# Patient Record
Sex: FEMALE | Race: WHITE | Employment: UNEMPLOYED | URBAN - METROPOLITAN AREA
[De-identification: names, ages, dates, MRNs, and addresses within clinical notes are randomized per-mention and may not be internally consistent; named-entity substitution may affect disease eponyms.]

---

## 2020-03-06 LAB
HBA1C MFR BLD HPLC: 5.3 %
LDL-C, EXTERNAL: 145

## 2020-07-02 LAB — CREATININE, EXTERNAL: 0.64

## 2020-07-06 ENCOUNTER — HOSPITAL ENCOUNTER (OUTPATIENT)
Dept: PREADMISSION TESTING | Age: 65
Discharge: HOME | End: 2020-07-06
Payer: OTHER GOVERNMENT

## 2020-07-06 PROCEDURE — 87635 SARS-COV-2 COVID-19 AMP PRB: CPT

## 2020-07-09 ENCOUNTER — ANESTHESIA EVENT (OUTPATIENT)
Dept: SURGERY | Age: 65
End: 2020-07-09
Payer: OTHER GOVERNMENT

## 2020-07-09 LAB — SARS-COV-2, COV2NT: NOT DETECTED

## 2020-07-09 RX ORDER — ERGOCALCIFEROL 1.25 MG/1
50000 CAPSULE ORAL
COMMUNITY
End: 2022-05-19

## 2020-07-09 RX ORDER — DIPHENHYDRAMINE HCL 25 MG
25 CAPSULE ORAL
COMMUNITY

## 2020-07-10 ENCOUNTER — HOSPITAL ENCOUNTER (OUTPATIENT)
Age: 65
Setting detail: OUTPATIENT SURGERY
Discharge: HOME | End: 2020-07-10
Attending: PODIATRIST | Admitting: PODIATRIST
Payer: OTHER GOVERNMENT

## 2020-07-10 ENCOUNTER — ANESTHESIA (OUTPATIENT)
Dept: SURGERY | Age: 65
End: 2020-07-10
Payer: OTHER GOVERNMENT

## 2020-07-10 VITALS
WEIGHT: 175.2 LBS | TEMPERATURE: 97.3 F | DIASTOLIC BLOOD PRESSURE: 64 MMHG | HEIGHT: 67 IN | OXYGEN SATURATION: 96 % | BODY MASS INDEX: 27.5 KG/M2 | SYSTOLIC BLOOD PRESSURE: 109 MMHG | RESPIRATION RATE: 16 BRPM | HEART RATE: 56 BPM

## 2020-07-10 DIAGNOSIS — R52 PAIN: Primary | ICD-10-CM

## 2020-07-10 PROCEDURE — 77030020268 HC MISC GENERAL SUPPLY: Performed by: PODIATRIST

## 2020-07-10 PROCEDURE — 76010000133 HC OR TIME 3 TO 3.5 HR: Performed by: PODIATRIST

## 2020-07-10 PROCEDURE — C1713 ANCHOR/SCREW BN/BN,TIS/BN: HCPCS | Performed by: PODIATRIST

## 2020-07-10 PROCEDURE — 74011000250 HC RX REV CODE- 250: Performed by: NURSE ANESTHETIST, CERTIFIED REGISTERED

## 2020-07-10 PROCEDURE — 76060000037 HC ANESTHESIA 3 TO 3.5 HR: Performed by: PODIATRIST

## 2020-07-10 PROCEDURE — 76210000006 HC OR PH I REC 0.5 TO 1 HR: Performed by: PODIATRIST

## 2020-07-10 PROCEDURE — 77030040922 HC BLNKT HYPOTHRM STRY -A: Performed by: PODIATRIST

## 2020-07-10 PROCEDURE — 77030020753 HC CUF TRNQT 1BLA STRY -B: Performed by: PODIATRIST

## 2020-07-10 PROCEDURE — 77030040361 HC SLV COMPR DVT MDII -B: Performed by: PODIATRIST

## 2020-07-10 PROCEDURE — 77030018836 HC SOL IRR NACL ICUM -A: Performed by: PODIATRIST

## 2020-07-10 PROCEDURE — 77030006773 HC BLD SAW OSC BRSM -A: Performed by: PODIATRIST

## 2020-07-10 PROCEDURE — 76210000026 HC REC RM PH II 1 TO 1.5 HR: Performed by: PODIATRIST

## 2020-07-10 PROCEDURE — 74011250636 HC RX REV CODE- 250/636: Performed by: NURSE ANESTHETIST, CERTIFIED REGISTERED

## 2020-07-10 PROCEDURE — 74011000250 HC RX REV CODE- 250: Performed by: PODIATRIST

## 2020-07-10 PROCEDURE — 77030021122 HC SPLNT MAT FST BSNM -A: Performed by: PODIATRIST

## 2020-07-10 PROCEDURE — 77030006722: Performed by: PODIATRIST

## 2020-07-10 PROCEDURE — 74011250637 HC RX REV CODE- 250/637: Performed by: NURSE ANESTHETIST, CERTIFIED REGISTERED

## 2020-07-10 PROCEDURE — 77030002933 HC SUT MCRYL J&J -A: Performed by: PODIATRIST

## 2020-07-10 PROCEDURE — 74011250637 HC RX REV CODE- 250/637: Performed by: ANESTHESIOLOGY

## 2020-07-10 PROCEDURE — 77030031139 HC SUT VCRL2 J&J -A: Performed by: PODIATRIST

## 2020-07-10 PROCEDURE — 77030011247 HC DRN WND KT TLS STRY -B: Performed by: PODIATRIST

## 2020-07-10 PROCEDURE — 74011250636 HC RX REV CODE- 250/636: Performed by: PODIATRIST

## 2020-07-10 PROCEDURE — 77030002986 HC SUT PROL J&J -A: Performed by: PODIATRIST

## 2020-07-10 PROCEDURE — 77030006632 HC BLD ENDOSC DISP STRY -C: Performed by: PODIATRIST

## 2020-07-10 PROCEDURE — 77030011265 HC ELECTRD BLD HEX COVD -A: Performed by: PODIATRIST

## 2020-07-10 DEVICE — SCR BNE NLCK 3.5X14MM STRL -- MOTOBAND CP: Type: IMPLANTABLE DEVICE | Site: FOOT | Status: FUNCTIONAL

## 2020-07-10 RX ORDER — MAGNESIUM SULFATE 100 %
4 CRYSTALS MISCELLANEOUS AS NEEDED
Status: DISCONTINUED | OUTPATIENT
Start: 2020-07-10 | End: 2020-07-10 | Stop reason: HOSPADM

## 2020-07-10 RX ORDER — NEOMYCIN AND POLYMYXIN B SULFATES 40; 200000 MG/ML; [USP'U]/ML
SOLUTION IRRIGATION AS NEEDED
Status: DISCONTINUED | OUTPATIENT
Start: 2020-07-10 | End: 2020-07-10 | Stop reason: HOSPADM

## 2020-07-10 RX ORDER — PROPOFOL 10 MG/ML
INJECTION, EMULSION INTRAVENOUS AS NEEDED
Status: DISCONTINUED | OUTPATIENT
Start: 2020-07-10 | End: 2020-07-10 | Stop reason: HOSPADM

## 2020-07-10 RX ORDER — OXYCODONE AND ACETAMINOPHEN 5; 325 MG/1; MG/1
1 TABLET ORAL AS NEEDED
Status: DISCONTINUED | OUTPATIENT
Start: 2020-07-10 | End: 2020-07-10 | Stop reason: HOSPADM

## 2020-07-10 RX ORDER — INSULIN LISPRO 100 [IU]/ML
INJECTION, SOLUTION INTRAVENOUS; SUBCUTANEOUS ONCE
Status: DISCONTINUED | OUTPATIENT
Start: 2020-07-10 | End: 2020-07-10 | Stop reason: HOSPADM

## 2020-07-10 RX ORDER — ONDANSETRON 2 MG/ML
INJECTION INTRAMUSCULAR; INTRAVENOUS AS NEEDED
Status: DISCONTINUED | OUTPATIENT
Start: 2020-07-10 | End: 2020-07-10 | Stop reason: HOSPADM

## 2020-07-10 RX ORDER — GLYCOPYRROLATE 0.2 MG/ML
INJECTION INTRAMUSCULAR; INTRAVENOUS AS NEEDED
Status: DISCONTINUED | OUTPATIENT
Start: 2020-07-10 | End: 2020-07-10 | Stop reason: HOSPADM

## 2020-07-10 RX ORDER — NEOSTIGMINE METHYLSULFATE 1 MG/ML
INJECTION, SOLUTION INTRAVENOUS AS NEEDED
Status: DISCONTINUED | OUTPATIENT
Start: 2020-07-10 | End: 2020-07-10 | Stop reason: HOSPADM

## 2020-07-10 RX ORDER — BUPIVACAINE HYDROCHLORIDE 5 MG/ML
INJECTION, SOLUTION EPIDURAL; INTRACAUDAL AS NEEDED
Status: DISCONTINUED | OUTPATIENT
Start: 2020-07-10 | End: 2020-07-10 | Stop reason: HOSPADM

## 2020-07-10 RX ORDER — ROCURONIUM BROMIDE 10 MG/ML
INJECTION, SOLUTION INTRAVENOUS AS NEEDED
Status: DISCONTINUED | OUTPATIENT
Start: 2020-07-10 | End: 2020-07-10 | Stop reason: HOSPADM

## 2020-07-10 RX ORDER — SCOLOPAMINE TRANSDERMAL SYSTEM 1 MG/1
1 PATCH, EXTENDED RELEASE TRANSDERMAL
Status: DISCONTINUED | OUTPATIENT
Start: 2020-07-10 | End: 2020-07-10 | Stop reason: HOSPADM

## 2020-07-10 RX ORDER — FENTANYL CITRATE 50 UG/ML
50 INJECTION, SOLUTION INTRAMUSCULAR; INTRAVENOUS
Status: DISCONTINUED | OUTPATIENT
Start: 2020-07-10 | End: 2020-07-10 | Stop reason: HOSPADM

## 2020-07-10 RX ORDER — DEXAMETHASONE SODIUM PHOSPHATE 4 MG/ML
INJECTION, SOLUTION INTRA-ARTICULAR; INTRALESIONAL; INTRAMUSCULAR; INTRAVENOUS; SOFT TISSUE AS NEEDED
Status: DISCONTINUED | OUTPATIENT
Start: 2020-07-10 | End: 2020-07-10 | Stop reason: HOSPADM

## 2020-07-10 RX ORDER — FENTANYL CITRATE 50 UG/ML
25 INJECTION, SOLUTION INTRAMUSCULAR; INTRAVENOUS AS NEEDED
Status: DISCONTINUED | OUTPATIENT
Start: 2020-07-10 | End: 2020-07-10 | Stop reason: HOSPADM

## 2020-07-10 RX ORDER — LIDOCAINE HYDROCHLORIDE 10 MG/ML
INJECTION, SOLUTION EPIDURAL; INFILTRATION; INTRACAUDAL; PERINEURAL AS NEEDED
Status: DISCONTINUED | OUTPATIENT
Start: 2020-07-10 | End: 2020-07-10 | Stop reason: HOSPADM

## 2020-07-10 RX ORDER — SODIUM CHLORIDE, SODIUM LACTATE, POTASSIUM CHLORIDE, CALCIUM CHLORIDE 600; 310; 30; 20 MG/100ML; MG/100ML; MG/100ML; MG/100ML
50 INJECTION, SOLUTION INTRAVENOUS CONTINUOUS
Status: DISCONTINUED | OUTPATIENT
Start: 2020-07-10 | End: 2020-07-10 | Stop reason: HOSPADM

## 2020-07-10 RX ORDER — DEXTROSE 50 % IN WATER (D50W) INTRAVENOUS SYRINGE
25-50 AS NEEDED
Status: DISCONTINUED | OUTPATIENT
Start: 2020-07-10 | End: 2020-07-10 | Stop reason: HOSPADM

## 2020-07-10 RX ORDER — FENTANYL CITRATE 50 UG/ML
INJECTION, SOLUTION INTRAMUSCULAR; INTRAVENOUS AS NEEDED
Status: DISCONTINUED | OUTPATIENT
Start: 2020-07-10 | End: 2020-07-10 | Stop reason: HOSPADM

## 2020-07-10 RX ORDER — ASPIRIN 325 MG
325 TABLET ORAL DAILY
Qty: 21 TAB | Refills: 0 | Status: SHIPPED | OUTPATIENT
Start: 2020-07-10 | End: 2020-07-31

## 2020-07-10 RX ORDER — LIDOCAINE HYDROCHLORIDE 20 MG/ML
INJECTION, SOLUTION EPIDURAL; INFILTRATION; INTRACAUDAL; PERINEURAL AS NEEDED
Status: DISCONTINUED | OUTPATIENT
Start: 2020-07-10 | End: 2020-07-10 | Stop reason: HOSPADM

## 2020-07-10 RX ORDER — AMOXICILLIN AND CLAVULANATE POTASSIUM 875; 125 MG/1; MG/1
1 TABLET, FILM COATED ORAL 2 TIMES DAILY
Qty: 20 TAB | Refills: 0 | Status: SHIPPED | OUTPATIENT
Start: 2020-07-10 | End: 2020-07-20

## 2020-07-10 RX ORDER — MIDAZOLAM HYDROCHLORIDE 1 MG/ML
INJECTION, SOLUTION INTRAMUSCULAR; INTRAVENOUS AS NEEDED
Status: DISCONTINUED | OUTPATIENT
Start: 2020-07-10 | End: 2020-07-10 | Stop reason: HOSPADM

## 2020-07-10 RX ORDER — FAMOTIDINE 20 MG/1
20 TABLET, FILM COATED ORAL ONCE
Status: COMPLETED | OUTPATIENT
Start: 2020-07-10 | End: 2020-07-10

## 2020-07-10 RX ORDER — SODIUM CHLORIDE, SODIUM LACTATE, POTASSIUM CHLORIDE, CALCIUM CHLORIDE 600; 310; 30; 20 MG/100ML; MG/100ML; MG/100ML; MG/100ML
25 INJECTION, SOLUTION INTRAVENOUS CONTINUOUS
Status: DISCONTINUED | OUTPATIENT
Start: 2020-07-10 | End: 2020-07-10 | Stop reason: HOSPADM

## 2020-07-10 RX ORDER — HYDROCODONE BITARTRATE AND ACETAMINOPHEN 5; 300 MG/1; MG/1
1 TABLET ORAL
Qty: 21 TAB | Refills: 0 | Status: SHIPPED | OUTPATIENT
Start: 2020-07-10 | End: 2022-05-19 | Stop reason: ALTCHOICE

## 2020-07-10 RX ADMIN — FENTANYL CITRATE 50 MCG: 50 INJECTION, SOLUTION INTRAMUSCULAR; INTRAVENOUS at 10:54

## 2020-07-10 RX ADMIN — OXYCODONE HYDROCHLORIDE AND ACETAMINOPHEN 1 TABLET: 5; 325 TABLET ORAL at 12:40

## 2020-07-10 RX ADMIN — SODIUM CHLORIDE, SODIUM LACTATE, POTASSIUM CHLORIDE, AND CALCIUM CHLORIDE 50 ML/HR: 600; 310; 30; 20 INJECTION, SOLUTION INTRAVENOUS at 06:26

## 2020-07-10 RX ADMIN — GLYCOPYRROLATE 0.4 MG: 0.2 INJECTION INTRAMUSCULAR; INTRAVENOUS at 10:07

## 2020-07-10 RX ADMIN — FENTANYL CITRATE 50 MCG: 50 INJECTION, SOLUTION INTRAMUSCULAR; INTRAVENOUS at 07:36

## 2020-07-10 RX ADMIN — PROPOFOL 150 MG: 10 INJECTION, EMULSION INTRAVENOUS at 07:36

## 2020-07-10 RX ADMIN — FENTANYL CITRATE 50 MCG: 50 INJECTION, SOLUTION INTRAMUSCULAR; INTRAVENOUS at 11:35

## 2020-07-10 RX ADMIN — LIDOCAINE HYDROCHLORIDE 40 MG: 20 INJECTION, SOLUTION EPIDURAL; INFILTRATION; INTRACAUDAL; PERINEURAL at 07:36

## 2020-07-10 RX ADMIN — WATER 1 G: 1 INJECTION INTRAMUSCULAR; INTRAVENOUS; SUBCUTANEOUS at 07:50

## 2020-07-10 RX ADMIN — FAMOTIDINE 20 MG: 20 TABLET ORAL at 06:26

## 2020-07-10 RX ADMIN — MIDAZOLAM HYDROCHLORIDE 2 MG: 2 INJECTION, SOLUTION INTRAMUSCULAR; INTRAVENOUS at 07:30

## 2020-07-10 RX ADMIN — ROCURONIUM BROMIDE 50 MG: 50 INJECTION INTRAVENOUS at 07:36

## 2020-07-10 RX ADMIN — ONDANSETRON 4 MG: 2 INJECTION INTRAMUSCULAR; INTRAVENOUS at 10:06

## 2020-07-10 RX ADMIN — Medication 3 MG: at 10:07

## 2020-07-10 RX ADMIN — DEXAMETHASONE SODIUM PHOSPHATE 4 MG: 4 INJECTION, SOLUTION INTRAMUSCULAR; INTRAVENOUS at 10:06

## 2020-07-10 NOTE — PROGRESS NOTES
conducted a pre-surgery visit with [facility], who is a 60 y.o.,female. The  provided the following Interventions:  Initiated a relationship of care and support. Plan:  Chaplains will continue to follow and will provide pastoral care on an as needed/requested basis.  recommends bedside caregivers page  on duty if patient shows signs of acute spiritual or emotional distress.     [facility]  [phone]

## 2020-07-10 NOTE — ANESTHESIA PREPROCEDURE EVALUATION
Relevant Problems   No relevant active problems       Anesthetic History     PONV          Review of Systems / Medical History  Patient summary reviewed, nursing notes reviewed and pertinent labs reviewed    Pulmonary  Within defined limits                 Neuro/Psych   Within defined limits           Cardiovascular                  Exercise tolerance: >4 METS     GI/Hepatic/Renal  Within defined limits              Endo/Other  Within defined limits           Other Findings              Physical Exam    Airway  Mallampati: II  TM Distance: 4 - 6 cm  Neck ROM: normal range of motion   Mouth opening: Normal     Cardiovascular  Regular rate and rhythm,  S1 and S2 normal,  no murmur, click, rub, or gallop  Rhythm: regular  Rate: normal         Dental  No notable dental hx       Pulmonary  Breath sounds clear to auscultation               Abdominal  GI exam deferred       Other Findings            Anesthetic Plan    ASA: 2  Anesthesia type: general          Induction: Intravenous  Anesthetic plan and risks discussed with: Patient

## 2020-07-10 NOTE — BRIEF OP NOTE
Brief Postoperative Note    Patient: Allyssa Rivera  YOB: 1955  MRN: [id number]    Date of Procedure: 7/10/2020     Pre-Op Diagnosis: Left foot hallux rigidus, plantar fasciitis with heel spur    Post-Op Diagnosis: Same    Procedure(s):  LEFT FOOT FIRST METATARSOPHALANGEAL JOINT FUSION, ENDOSCOPIC PLANTAR FACIOTOMY WITH REMOVAL OF HEEL SPUR    Surgeon(s):  Shawna Holcomb DPM    Surgical Assistant: None    Anesthesia: MAC     Estimated Blood Loss (mL): 15 cc    Complications: None    Specimens: * No specimens in log *     Implants:   Implant Name Type Inv.  Item Serial No.  Lot No. LRB No. Used Action   dynaforce mpj short plate [id number]    [employer] [id number] Left 1 Implanted   SCR BNE NLCK 3.5X14MM STRL -- MOTOBAND CP - [id number]  SCR BNE NLCK 3.5X14MM STRL -- MOTOBAND CP  [employer] [id number] Left 1 Implanted   SCR BNE NLCK 3.5X14MM STRL -- MOTOBAND CP - [id number]  SCR BNE NLCK 3.5X14MM STRL -- MOTOBAND CP  [employer] [id number] Left 1 Implanted   motoband polyaxial locking screw    [employer] [id number] Left 1 Implanted   motoband polyaxial locking screw    [employer] [id number] Left 1 Implanted       Drains: None    Findings: 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

## 2020-07-10 NOTE — H&P
History and Physical Addendum      Patient is medically cleared for surgery by PCP. No new clinical changes noted since then.

## 2020-07-10 NOTE — ANESTHESIA POSTPROCEDURE EVALUATION
Procedure(s):  LEFT FOOT FIRST METATARSOPHALANGEAL JOINT FUSION/FIRST MTP FUSION PLATE FROM SARA (ANGEL WILSON)  ENDOSCOPIC PLANTAR FACIOTOMY WITH REMOVAL OF HEEL SPUR. general    Anesthesia Post Evaluation      Multimodal analgesia: multimodal analgesia used between 6 hours prior to anesthesia start to PACU discharge  Patient location during evaluation: bedside  Patient participation: complete - patient participated  Level of consciousness: awake  Pain management: adequate  Airway patency: patent  Anesthetic complications: no  Cardiovascular status: stable  Respiratory status: acceptable  Hydration status: acceptable  Post anesthesia nausea and vomiting:  controlled      INITIAL Post-op Vital signs:   Vitals Value Taken Time   /65 7/10/2020 11:52 AM   Temp 36.3 °C (97.3 °F) 7/10/2020 11:02 AM   Pulse 56 7/10/2020 11:54 AM   Resp 14 7/10/2020 11:54 AM   SpO2 98 % 7/10/2020 11:54 AM   Vitals shown include unvalidated device data.

## 2020-07-10 NOTE — DISCHARGE INSTRUCTIONS
INSTRUCTIONS FOLLOWING FOOT SURGERY    ACTIVITY:  · Elevate feet were 48 hours  · Use ice as directed by your doctor  · Use crutches as directed by your doctor    DIET:  · Clear liquids until no nausea or vomiting; then light diet for the first day  · An advance to regular diet On second day, unless your doctor orders otherwise. PAIN:  · Take pain medication ouster acted by your doctor. · Call your doctor if pain is not relieved by medication. · Do not take aspirin or blood thinners until directed by your doctor. CALL YOUR DOCTOR IF:  · Excessive bleeding that does not stop after holding mild pressure over the area  · Temperature of 101° F or above  · Redness, excessive swelling or bruising, and/or green or yellow, smelly discharge from incision  · Loss of sensation -cold, white, or blue toes    AFTER ANESTHESIA :   · For the first 24 hours: do not drive, drink alcoholic beverages, or make important decisions. · Be aware of dizziness following anesthesia and while taking pain medication. Patient Education        Plantar Fasciitis: Care Instructions  Your Care Instructions     Plantar fasciitis is pain and inflammation of the plantar fascia, the tissue at the bottom of your foot that connects the heel bone to the toes. The plantar fascia also supports the arch. If you strain the plantar fascia, it can develop small tears and cause heel pain when you stand or walk. Plantar fasciitis can be caused by running or other sports. It also may occur in people who are overweight or who have high arches or flat feet. You may get plantar fasciitis if you walk or stand for long periods, or have a tight Achilles tendon or calf muscles. You can improve your foot pain with rest and other care at home. It might take a few weeks to a few months for your foot to heal completely. Follow-up care is a key part of your treatment and safety.  Be sure to make and go to all appointments, and call your doctor if you are having problems. It's also a good idea to know your test results and keep a list of the medicines you take. How can you care for yourself at home? · Rest your feet often. Reduce your activity to a level that lets you avoid pain. If possible, do not run or walk on hard surfaces. · Take pain medicines exactly as directed. ? If the doctor gave you a prescription medicine for pain, take it as prescribed. ? If you are not taking a prescription pain medicine, take an over-the-counter anti-inflammatory medicine for pain and swelling, such as ibuprofen (Advil, Motrin) or naproxen (Aleve). Read and follow all instructions on the label. · Use ice massage to help with pain and swelling. You can use an ice cube or an ice cup several times a day. To make an ice cup, fill a paper cup with water and freeze it. Cut off the top of the cup until a half-inch of ice shows. Hold onto the remaining paper to use the cup. Rub the ice in small circles over the area for 5 to 7 minutes. · Contrast baths, which alternate hot and cold water, can also help reduce swelling. But because heat alone may make pain and swelling worse, end a contrast bath with a soak in cold water. · Wear a night splint if your doctor suggests it. A night splint holds your foot with the toes pointed up and the foot and ankle at a 90-degree angle. This position gives the bottom of your foot a constant, gentle stretch. · Do simple exercises such as calf stretches and towel stretches 2 to 3 times each day, especially when you first get up in the morning. These can help the plantar fascia become more flexible. They also make the muscles that support your arch stronger. Hold these stretches for 15 to 30 seconds per stretch. Repeat 2 to 4 times. ? Stand about 1 foot from a wall. Place the palms of both hands against the wall at chest level.  Lean forward against the wall, keeping one leg with the knee straight and heel on the ground while bending the knee of the other leg.  ? Sit down on the floor or a mat with your feet stretched in front of you. Roll up a towel lengthwise, and loop it over the ball of your foot. Holding the towel at both ends, gently pull the towel toward you to stretch your foot. · Wear shoes with good arch support. Athletic shoes or shoes with a well-cushioned sole are good choices. · Try heel cups or shoe inserts (orthotics) to help cushion your heel. You can buy these at many shoe stores. · Put on your shoes as soon as you get out of bed. Going barefoot or wearing slippers may make your pain worse. · Reach and stay at a good weight for your height. This puts less strain on your feet. When should you call for help? Call your doctor now or seek immediate medical care if:  · You have heel pain with fever, redness, or warmth in your heel. · You cannot put weight on the sore foot. Watch closely for changes in your health, and be sure to contact your doctor if:  · You have numbness or tingling in your heel. · Your heel pain lasts more than 2 weeks. Where can you learn more? Go to http://jie-frandy.info/  Enter X351 in the search box to learn more about \"Plantar Fasciitis: Care Instructions. \"  Current as of: March 2, 2020               Content Version: 12.5  © 5543-3615 [employer], [employer]. Care instructions adapted under license by [employer] (which disclaims liability or warranty for this information). If you have questions about a medical condition or this instruction, always ask your healthcare professional. [place] [address] any warranty or liability for your use of this information.       DISCHARGE SUMMARY from Nurse    PATIENT INSTRUCTIONS:    After general anesthesia or intravenous sedation, for 24 hours or while taking prescription Narcotics:  · Limit your activities  · Do not drive and operate hazardous machinery  · Do not make important personal or business decisions  · Do  not drink alcoholic beverages  · If you have not urinated within 8 hours after discharge, please contact your surgeon on call. Report the following to your surgeon:  · Excessive pain, swelling, redness or odor of or around the surgical area  · Temperature over 100.5  · Nausea and vomiting lasting longer than 4 hours or if unable to take medications  · Any signs of decreased circulation or nerve impairment to extremity: change in color, persistent  numbness, tingling, coldness or increase pain  · Any questions    What to do at Home:  Recommended activity: Activity as tolerated and no driving for today. *  Please give a list of your current medications to your Primary Care Provider. *  Please update this list whenever your medications are discontinued, doses are      changed, or new medications (including over-the-counter products) are added. *  Please carry medication information at all times in case of emergency situations. These are general instructions for a healthy lifestyle:    No smoking/ No tobacco products/ Avoid exposure to second hand smoke  Surgeon General's Warning:  Quitting smoking now greatly reduces serious risk to your health. Obesity, smoking, and sedentary lifestyle greatly increases your risk for illness    A healthy diet, regular physical exercise & weight monitoring are important for maintaining a healthy lifestyle    You may be retaining fluid if you have a history of heart failure or if you experience any of the following symptoms:  Weight gain of 3 pounds or more overnight or 5 pounds in a week, increased swelling in our hands or feet or shortness of breath while lying flat in bed. Please call your doctor as soon as you notice any of these symptoms; do not wait until your next office visit. The discharge information has been reviewed with the {PATIENT PARENT GUARDIAN:12703}. The {PATIENT PARENT GUARDIAN:42642} verbalized understanding.   Discharge medications reviewed with the {Dishcarge meds reviewed [facility]:10141} and appropriate educational materials and side effects teaching were provided.   ___________________________________________________________________________________________________________________________________

## 2020-07-11 NOTE — OP NOTES
Operative Report     Patient: Zach Godoy MRN: [id number]  SSN: [id number]    YOB: 1955  Age: 60 y.o. Sex: female       Indications: The patient was seen in office for left heel pain as well as for left 1st MTPJ pain. Patient noted to have severe DJD of 1st MTPJ. Patient has tried orthotics and stretching without relief. She also had an MRI of left foot which confirmed plantar fasciitis. Patient had exhausted conservative treatment and wanted to proceed with surgical intervention. All risks, benefits, complications of procedure discussed in detail with patient. No guarantee made to outcome of procedure. Patient voiced understanding and signed consent. Date of Surgery: 7/10/2020     Preoperative Diagnosis:  Left foot hallux rigidus, plantar fasciitis with heel spur    Postoperative Diagnosis: Same    Surgeon(s) and Role:     * Mallory Castorena DPM - Primary      Anesthesia: MAC    Procedure:  Procedure(s):  LEFT FOOT FIRST METATARSOPHALANGEAL JOINT ARTHRODESIS, ENDOSCOPIC PLANTAR FACIOTOMY WITH REMOVAL OF HEEL SPUR    Procedure in Detail:     Patient brought into the operating room and placed on the table in a supine position. Timeout performed with myself in the room verifying correct procedure, site, instrumentation present. A well-padded tourniquet was applied to the ankle. An Esmarch bandage was utilized to exsanguinate the limb after a sterile prep. Tourniquet was inflated to 250 mmHg. A 6 cm slightly curvilinear incision was made over the dorsal aspect of the left first metatarsophalangeal joint ending dorsally extending from mid metatarsal shaft to base of proximal phalanx. It was deepened down with care to cauterize superficial vessels. Medial neurovascular bundle was identified, protected, and retracted. Linear capsular incision was made delivering first metatarsophalangeal joint into the surgical field. Noted bony osteophyte with fragments noted at joint.  Once exposure of the base of the proximal phalanx and the head of the 1st metatarsal were performed, a combination of sagittal saw as well as reamers was utilized to create healthy bleeding bony tissues about the arthrodesis site in a cup and cone fashion. At this time a temporary fixation of k-wire was thrown from distal medial to proximal lateral across the 1st metatarsophalangeal joint arthrodesis site. At this time, hardware in the form of a plate and staple and screws was placed using standard technique. [place] 1st MTP plate, staple and screws were utilized. Excellent stability, compression and toe position was achieved with confirmation on FluoroScan. Please note that care was taken to fuse the hallux in a slightly dorsiflexed position approximately 5 degrees in the sagittal plane with slight abduction / neutral position within the transverse plane and no frontal plane deviation. Intraoperative fluoroscopy was brought into the operative site for evaluation of the correction. The correction was found to be excellent. The wound was flushed with copious amounts of normal physiologic sterile saline and inspected for any soft tissue or osseous debris, none of which was found. The capsule/periosteum was reapproximated with 2-0 Vicryl suture. The subcutaneous tissue was reapproximated with 3-0 vicryl suture. The subcuticular tissue was reapproximated with 4-0 monocryl suture. At this time, attention was directed to the patients medial left heel, where the medial calcaneal tubercle was palpated. A horizontal 1-cm incision was made approximately 2 cm distal from the medial calcaneal tubercle. The incision was deep into the subcutaneous tissues using blunt dissection. At this time, a blunt probe was inserted and utilized to identify the boundaries of the plantar fascia. A positive puckering was noted to the plantar aspect of the patients left foot confirming proper placement of the probe.   Next, the obturator and trocar were inserted through this medial incision inferior to the plantar fascia and was transversely directed to the lateral aspect of the heel until tenting of the skin was noted on the lateral aspect. At this site, a second horizontal 1-cm incision was made to allow the exit of the trocar obturator combo. At this point, the trocar was removed, and three to four Q-tips were run from medial to lateral to remove any fatty deposits or other debris. The scope was then placed through the obturator from the lateral incision site to visualize the plantar fascia. Next, the hook blade was placed along the plantar aspect of the patients medial heel, where the medial half of the plantar fascia was approximated and appropriately marked. This hook blade was then inserted medially, and the central and medial band of the plantar fascia were carefully transected. Under endoscopic evaluation, it was noted that the lateral band of the plantar fascia was intact while the medial and central half of the plantar fascia were appropriately released. With the obturator in place, once again three to four Q-tips were run from medial to lateral to remove any remnants of fat. Medial incision extended proximally and under fluoroscopy plantar heel spur identified and palpated. Using bone rongeur, prominent heel spur resected and it was confirmed. The surgical site was then flushed with copious amounts of normal sterile saline. The medial incision site subcutaneous tissue was approximated with 3-0 vicryl and skin was coapted utilizing 4-0 prolene in a simple interrupted technique. The lateral incision site was re-approximated and coapted utilizing 4-0 prolen. The left foot was then dressed with Adaptic overlying the suture sites, 4 x 4 gauze, Kerlix, and ace bandage.   At this time, the left ankle pneumatic tourniquet was deflated, and a positive hyperemic response was noted to the left foot with the capillary refill time less than 5 seconds to digits 1 through 5. Posterior splint in neutral ankle position applied. The patient tolerated the procedure and anesthesia well. Upon transfer to the recovery room, the patients vital signs were stable, and her neurovascular status was intact. Findings:  Severe DJD of 1st MTPJ with fragmentation    Estimated Blood Loss:  15 cc    Drains: none           Specimens: * No specimens in log *            Implants:    Implant Name Type Inv.  Item Serial No.  Lot No. LRB No. Used Action   PLATE SHORT [id number] DORSIFLEX    [employer] [id number] Left 1 Implanted   SCR BNE NLCK 3.5X14MM STRL -- MOTOBAND CP - [id number]  SCR BNE NLCK 3.5X14MM STRL -- MOTOBAND CP  [employer] [id number] Left 1 Implanted   SCR BNE NLCK 3.5X14MM STRL -- MOTOBAND CP - [id number]  SCR BNE NLCK 3.5X14MM STRL -- MOTOBAND CP  [employer] [id number] Left 1 Implanted   SCR LCK 3.5X16MM    [employer] [id number] Left 1 Implanted   SCR LCK 3.5X16MM    [employer] [id number] Left 1 Implanted   IMPL KT [id number]     [id number] Left 1 Implanted              Complications:  None

## 2020-12-20 ENCOUNTER — APPOINTMENT (OUTPATIENT)
Dept: GENERAL RADIOLOGY | Age: 65
DRG: 177 | End: 2020-12-20
Attending: EMERGENCY MEDICINE
Payer: OTHER GOVERNMENT

## 2020-12-20 ENCOUNTER — APPOINTMENT (OUTPATIENT)
Dept: CT IMAGING | Age: 65
DRG: 177 | End: 2020-12-20
Attending: EMERGENCY MEDICINE
Payer: OTHER GOVERNMENT

## 2020-12-20 ENCOUNTER — HOSPITAL ENCOUNTER (INPATIENT)
Age: 65
LOS: 5 days | Discharge: HOME | DRG: 177 | End: 2020-12-25
Attending: EMERGENCY MEDICINE | Admitting: HOSPITALIST
Payer: OTHER GOVERNMENT

## 2020-12-20 DIAGNOSIS — E87.6 HYPOKALEMIA: ICD-10-CM

## 2020-12-20 DIAGNOSIS — J96.01 ACUTE RESPIRATORY FAILURE WITH HYPOXIA (HCC): ICD-10-CM

## 2020-12-20 DIAGNOSIS — U07.1 PNEUMONIA DUE TO COVID-19 VIRUS: Primary | ICD-10-CM

## 2020-12-20 DIAGNOSIS — J12.82 PNEUMONIA DUE TO COVID-19 VIRUS: Primary | ICD-10-CM

## 2020-12-20 LAB
ALBUMIN SERPL-MCNC: 3.1 G/DL (ref 3.4–5)
ALBUMIN/GLOB SERPL: 0.7 {RATIO} (ref 0.8–1.7)
ALP SERPL-CCNC: 298 U/L (ref 45–117)
ALT SERPL-CCNC: 48 U/L (ref 13–56)
ANION GAP SERPL CALC-SCNC: 4 MMOL/L (ref 3–18)
APTT PPP: 26.6 SEC (ref 23–36.4)
AST SERPL-CCNC: 46 U/L (ref 10–38)
ATRIAL RATE: 65 BPM
BASOPHILS # BLD: 0 K/UL (ref 0–0.1)
BASOPHILS NFR BLD: 0 % (ref 0–2)
BILIRUB SERPL-MCNC: 0.5 MG/DL (ref 0.2–1)
BNP SERPL-MCNC: 154 PG/ML (ref 0–900)
BUN SERPL-MCNC: 11 MG/DL (ref 7–18)
BUN/CREAT SERPL: 18 (ref 12–20)
CALCIUM SERPL-MCNC: 9.2 MG/DL (ref 8.5–10.1)
CALCULATED P AXIS, ECG09: 52 DEGREES
CALCULATED R AXIS, ECG10: -12 DEGREES
CALCULATED T AXIS, ECG11: 24 DEGREES
CHLORIDE SERPL-SCNC: 104 MMOL/L (ref 100–111)
CO2 SERPL-SCNC: 31 MMOL/L (ref 21–32)
COVID-19 RAPID TEST, COVR: DETECTED
CREAT SERPL-MCNC: 0.61 MG/DL (ref 0.6–1.3)
CRP SERPL-MCNC: 11.2 MG/DL (ref 0–0.3)
D DIMER PPP FEU-MCNC: 0.9 UG/ML(FEU)
DIAGNOSIS, 93000: NORMAL
DIFFERENTIAL METHOD BLD: ABNORMAL
EOSINOPHIL # BLD: 0 K/UL (ref 0–0.4)
EOSINOPHIL NFR BLD: 1 % (ref 0–5)
ERYTHROCYTE [DISTWIDTH] IN BLOOD BY AUTOMATED COUNT: 12.3 % (ref 11.6–14.5)
FERRITIN SERPL-MCNC: 391 NG/ML (ref 8–388)
GLOBULIN SER CALC-MCNC: 4.6 G/DL (ref 2–4)
GLUCOSE SERPL-MCNC: 102 MG/DL (ref 74–99)
HCT VFR BLD AUTO: 39.6 % (ref 35–45)
HGB BLD-MCNC: 12.9 G/DL (ref 12–16)
INR PPP: 1 (ref 0.8–1.2)
LACTATE BLD-SCNC: 1.18 MMOL/L (ref 0.4–2)
LDH SERPL L TO P-CCNC: 364 U/L (ref 81–234)
LYMPHOCYTES # BLD: 0.9 K/UL (ref 0.9–3.6)
LYMPHOCYTES NFR BLD: 20 % (ref 21–52)
MCH RBC QN AUTO: 28.5 PG (ref 24–34)
MCHC RBC AUTO-ENTMCNC: 32.6 G/DL (ref 31–37)
MCV RBC AUTO: 87.4 FL (ref 74–97)
MONOCYTES # BLD: 0.3 K/UL (ref 0.05–1.2)
MONOCYTES NFR BLD: 6 % (ref 3–10)
NEUTS SEG # BLD: 3.2 K/UL (ref 1.8–8)
NEUTS SEG NFR BLD: 73 % (ref 40–73)
P-R INTERVAL, ECG05: 194 MS
PLATELET # BLD AUTO: 300 K/UL (ref 135–420)
PMV BLD AUTO: 9.5 FL (ref 9.2–11.8)
POTASSIUM SERPL-SCNC: 3.4 MMOL/L (ref 3.5–5.5)
PROT SERPL-MCNC: 7.7 G/DL (ref 6.4–8.2)
PROTHROMBIN TIME: 12.9 SEC (ref 11.5–15.2)
Q-T INTERVAL, ECG07: 418 MS
QRS DURATION, ECG06: 82 MS
QTC CALCULATION (BEZET), ECG08: 434 MS
RBC # BLD AUTO: 4.53 M/UL (ref 4.2–5.3)
SODIUM SERPL-SCNC: 139 MMOL/L (ref 136–145)
SOURCE, COVRS: ABNORMAL
SPECIMEN TYPE, XMCV1T: ABNORMAL
TROPONIN I SERPL-MCNC: <0.02 NG/ML (ref 0–0.04)
VENTRICULAR RATE, ECG03: 65 BPM
WBC # BLD AUTO: 4.4 K/UL (ref 4.6–13.2)

## 2020-12-20 PROCEDURE — XW033E5 INTRODUCTION OF REMDESIVIR ANTI-INFECTIVE INTO PERIPHERAL VEIN, PERCUTANEOUS APPROACH, NEW TECHNOLOGY GROUP 5: ICD-10-PCS | Performed by: HOSPITALIST

## 2020-12-20 PROCEDURE — 74011250636 HC RX REV CODE- 250/636: Performed by: EMERGENCY MEDICINE

## 2020-12-20 PROCEDURE — 99285 EMERGENCY DEPT VISIT HI MDM: CPT

## 2020-12-20 PROCEDURE — 77010033678 HC OXYGEN DAILY

## 2020-12-20 PROCEDURE — 65660000000 HC RM CCU STEPDOWN

## 2020-12-20 PROCEDURE — 74011000250 HC RX REV CODE- 250: Performed by: EMERGENCY MEDICINE

## 2020-12-20 PROCEDURE — 93005 ELECTROCARDIOGRAM TRACING: CPT

## 2020-12-20 PROCEDURE — 85730 THROMBOPLASTIN TIME PARTIAL: CPT

## 2020-12-20 PROCEDURE — 87635 SARS-COV-2 COVID-19 AMP PRB: CPT

## 2020-12-20 PROCEDURE — 74011250636 HC RX REV CODE- 250/636: Performed by: PHYSICIAN ASSISTANT

## 2020-12-20 PROCEDURE — 80053 COMPREHEN METABOLIC PANEL: CPT

## 2020-12-20 PROCEDURE — 94762 N-INVAS EAR/PLS OXIMTRY CONT: CPT

## 2020-12-20 PROCEDURE — 85610 PROTHROMBIN TIME: CPT

## 2020-12-20 PROCEDURE — 82728 ASSAY OF FERRITIN: CPT

## 2020-12-20 PROCEDURE — 85025 COMPLETE CBC W/AUTO DIFF WBC: CPT

## 2020-12-20 PROCEDURE — 71275 CT ANGIOGRAPHY CHEST: CPT

## 2020-12-20 PROCEDURE — 96361 HYDRATE IV INFUSION ADD-ON: CPT

## 2020-12-20 PROCEDURE — 74011250637 HC RX REV CODE- 250/637: Performed by: PHYSICIAN ASSISTANT

## 2020-12-20 PROCEDURE — 74011000258 HC RX REV CODE- 258: Performed by: EMERGENCY MEDICINE

## 2020-12-20 PROCEDURE — 83605 ASSAY OF LACTIC ACID: CPT

## 2020-12-20 PROCEDURE — 74011000636 HC RX REV CODE- 636: Performed by: EMERGENCY MEDICINE

## 2020-12-20 PROCEDURE — 85379 FIBRIN DEGRADATION QUANT: CPT

## 2020-12-20 PROCEDURE — 74011250637 HC RX REV CODE- 250/637: Performed by: EMERGENCY MEDICINE

## 2020-12-20 PROCEDURE — 71045 X-RAY EXAM CHEST 1 VIEW: CPT

## 2020-12-20 PROCEDURE — 96374 THER/PROPH/DIAG INJ IV PUSH: CPT

## 2020-12-20 PROCEDURE — 83880 ASSAY OF NATRIURETIC PEPTIDE: CPT

## 2020-12-20 PROCEDURE — 86140 C-REACTIVE PROTEIN: CPT

## 2020-12-20 PROCEDURE — 84484 ASSAY OF TROPONIN QUANT: CPT

## 2020-12-20 PROCEDURE — 83615 LACTATE (LD) (LDH) ENZYME: CPT

## 2020-12-20 RX ORDER — SODIUM CHLORIDE 0.9 % (FLUSH) 0.9 %
5-40 SYRINGE (ML) INJECTION AS NEEDED
Status: DISCONTINUED | OUTPATIENT
Start: 2020-12-20 | End: 2020-12-25 | Stop reason: HOSPADM

## 2020-12-20 RX ORDER — DIPHENHYDRAMINE HCL 25 MG
25 CAPSULE ORAL
Status: DISCONTINUED | OUTPATIENT
Start: 2020-12-20 | End: 2020-12-25 | Stop reason: HOSPADM

## 2020-12-20 RX ORDER — SODIUM CHLORIDE 0.9 % (FLUSH) 0.9 %
5-40 SYRINGE (ML) INJECTION EVERY 8 HOURS
Status: DISCONTINUED | OUTPATIENT
Start: 2020-12-20 | End: 2020-12-25 | Stop reason: HOSPADM

## 2020-12-20 RX ORDER — PROMETHAZINE HYDROCHLORIDE 25 MG/1
12.5 TABLET ORAL
Status: DISCONTINUED | OUTPATIENT
Start: 2020-12-20 | End: 2020-12-25 | Stop reason: HOSPADM

## 2020-12-20 RX ORDER — POTASSIUM CHLORIDE 7.45 MG/ML
10 INJECTION INTRAVENOUS
Status: DISCONTINUED | OUTPATIENT
Start: 2020-12-20 | End: 2020-12-20

## 2020-12-20 RX ORDER — POLYETHYLENE GLYCOL 3350 17 G/17G
17 POWDER, FOR SOLUTION ORAL DAILY PRN
Status: DISCONTINUED | OUTPATIENT
Start: 2020-12-20 | End: 2020-12-25 | Stop reason: HOSPADM

## 2020-12-20 RX ORDER — DEXAMETHASONE SODIUM PHOSPHATE 100 MG/10ML
10 INJECTION INTRAMUSCULAR; INTRAVENOUS DAILY
Status: DISCONTINUED | OUTPATIENT
Start: 2020-12-21 | End: 2020-12-20

## 2020-12-20 RX ORDER — DEXAMETHASONE SODIUM PHOSPHATE 4 MG/ML
6 INJECTION, SOLUTION INTRA-ARTICULAR; INTRALESIONAL; INTRAMUSCULAR; INTRAVENOUS; SOFT TISSUE ONCE
Status: COMPLETED | OUTPATIENT
Start: 2020-12-20 | End: 2020-12-20

## 2020-12-20 RX ORDER — ACETAMINOPHEN 650 MG/1
650 SUPPOSITORY RECTAL
Status: DISCONTINUED | OUTPATIENT
Start: 2020-12-20 | End: 2020-12-25 | Stop reason: HOSPADM

## 2020-12-20 RX ORDER — ONDANSETRON 2 MG/ML
4 INJECTION INTRAMUSCULAR; INTRAVENOUS
Status: DISCONTINUED | OUTPATIENT
Start: 2020-12-20 | End: 2020-12-25 | Stop reason: HOSPADM

## 2020-12-20 RX ORDER — ACETAMINOPHEN 325 MG/1
650 TABLET ORAL
Status: DISCONTINUED | OUTPATIENT
Start: 2020-12-20 | End: 2020-12-25 | Stop reason: HOSPADM

## 2020-12-20 RX ORDER — ENOXAPARIN SODIUM 100 MG/ML
40 INJECTION SUBCUTANEOUS EVERY 24 HOURS
Status: DISCONTINUED | OUTPATIENT
Start: 2020-12-20 | End: 2020-12-20

## 2020-12-20 RX ORDER — ENOXAPARIN SODIUM 100 MG/ML
30 INJECTION SUBCUTANEOUS EVERY 12 HOURS
Status: DISCONTINUED | OUTPATIENT
Start: 2020-12-20 | End: 2020-12-25 | Stop reason: HOSPADM

## 2020-12-20 RX ADMIN — Medication 10 ML: at 22:00

## 2020-12-20 RX ADMIN — DIPHENHYDRAMINE HYDROCHLORIDE 25 MG: 25 CAPSULE ORAL at 21:45

## 2020-12-20 RX ADMIN — IOPAMIDOL 100 ML: 755 INJECTION, SOLUTION INTRAVENOUS at 10:20

## 2020-12-20 RX ADMIN — ENOXAPARIN SODIUM 30 MG: 30 INJECTION SUBCUTANEOUS at 20:00

## 2020-12-20 RX ADMIN — SODIUM CHLORIDE 1000 ML: 900 INJECTION, SOLUTION INTRAVENOUS at 10:04

## 2020-12-20 RX ADMIN — POTASSIUM BICARBONATE 20 MEQ: 782 TABLET, EFFERVESCENT ORAL at 11:03

## 2020-12-20 RX ADMIN — Medication 10 ML: at 16:00

## 2020-12-20 RX ADMIN — REMDESIVIR 200 MG: 100 INJECTION, POWDER, LYOPHILIZED, FOR SOLUTION INTRAVENOUS at 15:09

## 2020-12-20 RX ADMIN — DEXAMETHASONE SODIUM PHOSPHATE 6 MG: 4 INJECTION, SOLUTION INTRAMUSCULAR; INTRAVENOUS at 12:23

## 2020-12-20 NOTE — PROGRESS NOTES
1445: Pt arrived to room 2706 via stretcher from ED. Pt covid +, placed on droplet plus precautions. VSS, nasal canula at 4 L. Pt proveded teaching on droplet plus precautions. All questions answered. 1515: Dr Puja Cisneros at the bedside. 1545: Dr Matt Singh at the bedside. 1930: Bedside and Verbal shift change report given to Malissa BROOKS (oncoming nurse) by Thalia Humphrey RN   (offgoing nurse). Report included the following information SBAR, Kardex, Intake/Output, MAR, Recent Results and Cardiac Rhythm NSR.

## 2020-12-20 NOTE — PROGRESS NOTES
Problem: Discharge Planning  Goal: *Discharge to safe environment  Outcome: Progressing Towards Goal  Plan is home vs home with [facility]    Reason for Admission:  Covid 19 respiratory distress                   RUR Score:    Na- still in ER                 Plan for utilizing home health:     tbd     PCP: First and Last name:  Susanna Colon   Name of Practice:    Are you a current patient: Yes/No: yes, but not for long, next month she will turn 73 yrs old and her insurance will change from [address] to Medicare/  for life. She is going to be switching MD's. Approximate date of last visit:    Can you participate in a virtual visit with your PCP:                     Current Advanced Directive/Advance Care Plan: not on chart                       Transition of Care Plan:    Chart reviewed and pt is not available by cell phone. I am unable to go to her room due to droplet plus covid isolation. Patient does not have home o2 but she is on 5 lpm o2 presently. I Called her when she is now  In ICU and spoke with her . She lives with her  and has her daughter staying with them while the daughter recuperates from something.( maybe covid?)  Patient is independent with ADL and does not use any DME. Plan is most likely home, Possibly be needing home health. Contact  #1 per pt- Daughter Devon Garcia  #2 per pt--   [address] [facility] [phone]      Care Management Interventions  PCP Verified by CM: No  Palliative Care Criteria Met (RRAT>21 & CHF Dx)?: No  Mode of Transport at Discharge: Other (see comment)(tbd)  Transition of Care Consult (CM Consult): Discharge Planning  Current Support Network: Lives with Spouse  Confirm Follow Up Transport: Family  The Plan for Transition of Care is Related to the Following Treatment Goals : resolution of acute symptoms     Unable to finish conversation due to MD came into the room to see pt. Latoya Stephens.  TIKI Hitchcock  [facility]  [phone], Pager [phone]  [email or web address]

## 2020-12-20 NOTE — ED PROVIDER NOTES
EMERGENCY DEPARTMENT HISTORY AND PHYSICAL EXAM    9:42 AM    Date: 12/20/2020  Patient Name: Jasbir Serra    History of Presenting Illness     Chief Complaint   Patient presents with    Positive For Covid-19    Respiratory Distress       History Provided By: Patient  Location/Duration/Severity/Modifying factors   Patient is a 29-year-old female with no prior medical history presenting today with a chief complaint of shortness of breath and cough. Symptoms have been going on for about 4 days although she originally had cough and body aches and upper respiratory symptoms which prompted her to get a Covid test at urgent care on December 11. This test came back positive. She reports that she has not lost her taste or smell. She has intermittent \"twinges\" of chest pain in her right upper chest.  She reports that this morning the breathing became so bad that she did call the ambulance. Patient reportedly was 88% by EMS on the way here, 84% on arrival here on room air was placed on 6 L of oxygen. She not have any active chest pain at this time. She is not experiencing any known fevers at home. No leg swelling or history of PE or DVT in the past.  Her cough is mostly dry. Breathing is described as having difficulty taking a deep breath. PCP: India Coughlin MD    Current Facility-Administered Medications   Medication Dose Route Frequency Provider Last Rate Last Admin    remdesivir 200 mg in 0.9% sodium chloride 250 mL IVPB  200 mg IntraVENous ONCE Bridget MIXON DO        Followed by   Tyra Hill ON 12/21/2020] remdesivir 100 mg in 0.9% sodium chloride 250 mL IVPB  100 mg IntraVENous Q24H Mary Anne DO         Current Outpatient Medications   Medication Sig Dispense Refill    HYDROcodone-acetaminophen (XODOL) 5-300 mg tablet Take 1 Tab by mouth every six (6) hours as needed for Pain for up to 7 days. Max Daily Amount: 4 Tabs.  21 Tab 0    ergocalciferol (Vitamin D2) 1,250 mcg (50,000 unit) capsule Take 50,000 Units by mouth every seven (7) days.  VITAMIN B COMPLEX PO Take  by mouth.  diphenhydrAMINE (BenadryL) 25 mg capsule Take 25 mg by mouth nightly as needed. Past History     Past Medical History:  Past Medical History:   Diagnosis Date    Nausea & vomiting        Past Surgical History:  Past Surgical History:   Procedure Laterality Date    HX COLONOSCOPY      HX GYN Left     oophorectomy    HX TONSILLECTOMY      HX UROLOGICAL      Lithotripsy    HX UROLOGICAL      Cystoscopy       Family History:  History reviewed. No pertinent family history. Social History:  Social History     Tobacco Use    Smoking status: Former Smoker     Quit date: 1991     Years since quittin.4   Substance Use Topics    Alcohol use: Never     Frequency: Never    Drug use: Not Currently     Types: Hallucinogenics, Marijuana     Comment: in her 25s       Allergies:  No Known Allergies    I reviewed and confirmed the above information with patient and updated as necessary. Review of Systems     Review of Systems   Constitutional: Negative for chills and fever. HENT: Negative for congestion, rhinorrhea, sinus pressure and sneezing. Eyes: Negative for visual disturbance. Respiratory: Positive for cough, chest tightness and shortness of breath. Negative for choking. Cardiovascular: Negative for chest pain and leg swelling. Gastrointestinal: Positive for nausea. Negative for abdominal pain, diarrhea and vomiting. Genitourinary: Negative for dysuria, frequency and urgency. Musculoskeletal: Negative for back pain and neck pain. Skin: Negative for rash. Neurological: Negative for syncope, numbness and headaches. Physical Exam     Visit Vitals  BP (!) 116/50   Pulse 68   Temp 98 °F (36.7 °C)   Resp 21   Ht 5' 7\" (1.702 m)   Wt 79.4 kg (175 lb)   SpO2 98%   BMI 27.41 kg/m²       Physical Exam  Vitals signs and nursing note reviewed.    Constitutional:       General: She is not in acute distress. Appearance: Normal appearance. She is normal weight. HENT:      Head: Normocephalic and atraumatic. Right Ear: External ear normal.      Left Ear: External ear normal.      Nose: Nose normal. No congestion or rhinorrhea. Mouth/Throat:      Mouth: Mucous membranes are moist.   Eyes:      Conjunctiva/sclera: Conjunctivae normal.   Neck:      Musculoskeletal: Normal range of motion and neck supple. Cardiovascular:      Rate and Rhythm: Normal rate and regular rhythm. Pulses: Normal pulses. Heart sounds: Normal heart sounds. No murmur. Comments: Mild resting tachypnea, no respiratory distress. Pulmonary:      Effort: Pulmonary effort is normal.      Breath sounds: Normal breath sounds. No wheezing, rhonchi or rales. Abdominal:      General: Abdomen is flat. Tenderness: There is no abdominal tenderness. There is no guarding or rebound. Musculoskeletal: Normal range of motion. General: No swelling or tenderness. Right lower leg: No edema. Left lower leg: No edema. Skin:     General: Skin is warm and dry. Capillary Refill: Capillary refill takes less than 2 seconds. Findings: No rash. Neurological:      General: No focal deficit present. Mental Status: She is alert. Diagnostic Study Results     Labs -  Recent Results (from the past 24 hour(s))   CBC WITH AUTOMATED DIFF    Collection Time: 12/20/20  9:14 AM   Result Value Ref Range    WBC 4.4 (L) 4.6 - 13.2 K/uL    RBC 4.53 4.20 - 5.30 M/uL    HGB 12.9 12.0 - 16.0 g/dL    HCT 39.6 35.0 - 45.0 %    MCV 87.4 74.0 - 97.0 FL    MCH 28.5 24.0 - 34.0 PG    MCHC 32.6 31.0 - 37.0 g/dL    RDW 12.3 11.6 - 14.5 %    PLATELET 446 214 - 853 K/uL    MPV 9.5 9.2 - 11.8 FL    NEUTROPHILS 73 40 - 73 %    LYMPHOCYTES 20 (L) 21 - 52 %    MONOCYTES 6 3 - 10 %    EOSINOPHILS 1 0 - 5 %    BASOPHILS 0 0 - 2 %    ABS. NEUTROPHILS 3.2 1.8 - 8.0 K/UL    ABS.  LYMPHOCYTES 0.9 0.9 - 3.6 K/UL    ABS. MONOCYTES 0.3 0.05 - 1.2 K/UL    ABS. EOSINOPHILS 0.0 0.0 - 0.4 K/UL    ABS. BASOPHILS 0.0 0.0 - 0.1 K/UL    DF AUTOMATED     METABOLIC PANEL, COMPREHENSIVE    Collection Time: 12/20/20  9:14 AM   Result Value Ref Range    Sodium 139 136 - 145 mmol/L    Potassium 3.4 (L) 3.5 - 5.5 mmol/L    Chloride 104 100 - 111 mmol/L    CO2 31 21 - 32 mmol/L    Anion gap 4 3.0 - 18 mmol/L    Glucose 102 (H) 74 - 99 mg/dL    BUN 11 7.0 - 18 MG/DL    Creatinine 0.61 0.6 - 1.3 MG/DL    BUN/Creatinine ratio 18 12 - 20      GFR est AA >60 >60 ml/min/1.73m2    GFR est non-AA >60 >60 ml/min/1.73m2    Calcium 9.2 8.5 - 10.1 MG/DL    Bilirubin, total 0.5 0.2 - 1.0 MG/DL    ALT (SGPT) 48 13 - 56 U/L    AST (SGOT) 46 (H) 10 - 38 U/L    Alk.  phosphatase 298 (H) 45 - 117 U/L    Protein, total 7.7 6.4 - 8.2 g/dL    Albumin 3.1 (L) 3.4 - 5.0 g/dL    Globulin 4.6 (H) 2.0 - 4.0 g/dL    A-G Ratio 0.7 (L) 0.8 - 1.7     PTT    Collection Time: 12/20/20  9:14 AM   Result Value Ref Range    aPTT 26.6 23.0 - 36.4 SEC   PROTHROMBIN TIME + INR    Collection Time: 12/20/20  9:14 AM   Result Value Ref Range    Prothrombin time 12.9 11.5 - 15.2 sec    INR 1.0 0.8 - 1.2     D DIMER    Collection Time: 12/20/20  9:14 AM   Result Value Ref Range    D DIMER 0.90 (H) <0.46 ug/ml(FEU)   TROPONIN I    Collection Time: 12/20/20  9:14 AM   Result Value Ref Range    Troponin-I, QT <0.02 0.0 - 0.045 NG/ML   NT-PRO BNP    Collection Time: 12/20/20  9:14 AM   Result Value Ref Range    NT pro- 0 - 900 PG/ML   POC LACTIC ACID    Collection Time: 12/20/20  9:17 AM   Result Value Ref Range    Lactic Acid (POC) 1.18 0.40 - 2.00 mmol/L   EKG, 12 LEAD, INITIAL    Collection Time: 12/20/20  9:17 AM   Result Value Ref Range    Ventricular Rate 65 BPM    Atrial Rate 65 BPM    P-R Interval 194 ms    QRS Duration 82 ms    Q-T Interval 418 ms    QTC Calculation (Bezet) 434 ms    Calculated P Axis 52 degrees    Calculated R Axis -12 degrees    Calculated T Axis 24 degrees    Diagnosis       Normal sinus rhythm  Normal ECG  No previous ECGs available  Confirmed by Ayla Rm MD, --- ([id number]) on 12/20/2020 11:04:48 AM     SARS-COV-2    Collection Time: 12/20/20  9:50 AM   Result Value Ref Range    Specimen source Nasopharyngeal      COVID-19 rapid test Detected (AA) NOTD      Specimen type NP Swab           Radiologic Studies -   CTA CHEST W OR W WO CONT   Final Result   IMPRESSION:      1. No evidence of pulmonary embolism. 2. Multifocal airspace disease with distribution as above, in keeping with   patient history of known COVID 19 pneumonia. XR CHEST PORT   Final Result   IMPRESSION:      Multifocal airspace disease as above in keeping with patient history of COVID   pneumonia. Medical Decision Making   I am the first provider for this patient. I reviewed the vital signs, available nursing notes, past medical history, past surgical history, family history and social history. Vital Signs-Reviewed the patient's vital signs. EKG: EKG: December 20, 2020, 0917. Normal sinus rhythm, rate of 65. Normal FL, QRS and QTc intervals. Normal axis. No ST segment elevation or depression. Overall normal sinus rhythm and normal EKG. Records Reviewed: Nursing Notes and Old Medical Records (Time of Review: 9:42 AM)    ED Course: Progress Notes, Reevaluation, and Consults:  ED Course as of Dec 20 1311   Sun Dec 20, 2020   1210 CTA chest negative for pulmonary embolism, multifocal opacities consistent with Covid. Patient had pulse oximetry that was low in the mid to low 80s on arrival here on room air, approximately 84% on room air. She is doing well now on 6 L of nasal cannula oxygen. Will likely require admission for hypoxic respiratory failure due to COVID-19. [ARASELI]   1236 Case discussed with Dr. Melanie Knight, of the ID service, agrees with Decadron and Remdesivir. Consider convalescent plasma, although data is not strong for use.       [ARASELI]      ED Course User Index  [ARASELI] Ziggy Gan DO         Provider Notes (Medical Decision Making):   MDM  Number of Diagnoses or Management Options  Acute respiratory failure with hypoxia ([address]  Hypokalemia  Pneumonia due to COVID-19 virus  Diagnosis management comments: Patient is a 69-year-old female with no medical history presenting today with shortness of breath. Reports she is Covid positive by test done at urgent care. Symptoms worsened today with increasing shortness of breath and patient felt she was unable to breathe. On exam lungs are grossly clear on auscultation, she is mildly tachypneic. No wheezing rales or rhonchi. She is not in overt distress but is hypoxic. Recorded 84% on room air on arrival here. Patient certainly not on any home oxygen but did improve here with nasal cannula supplementation. Differential includes COVID-19 pneumonia, pulmonary embolism, bacterial pneumonia, COPD, CHF, ACS, etc.    Plan will be to obtain laboratory results. Chest x-ray as well. Her chest x-ray reviewed by me shows patchy mild interstitial prominence. No effusion or pneumothorax. Her work-up demonstrates mild hypokalemia. D-dimer was positive, CTA of the chest demonstrates no pulmonary embolism but multiple opacities consistent with known COVID-19. Given patient's hypoxia down to 84% on room air, will require admission. Case discussed with hospitalist Dr. Carmine Card who agreed to accept the patient. Requested I contact the pharmacy to get authorization for remdesivir, which I called the pharmacy and they requested me to place a consult. Also discussed with ID who agrees with Decadron and remdesivir as already started. Discussed convalescent plasma however unclear if benefit. Patient updated with the plan and she will be admitted to the hospital service for further care. Patient expresses understanding and all questions answered.         Procedures    Critical Care Time: 30 minutes exclusive of procedures and teaching time in treatment of critical illness (Hypoxia)    Diagnosis     Clinical Impression:   1. Pneumonia due to COVID-19 virus    2. Acute respiratory failure with hypoxia (HCC)    3. Hypokalemia        Disposition: Admit    Follow-up Information    None          Patient's Medications   Start Taking    No medications on file   Continue Taking    DIPHENHYDRAMINE (BENADRYL) 25 MG CAPSULE    Take 25 mg by mouth nightly as needed. ERGOCALCIFEROL (VITAMIN D2) 1,250 MCG (50,000 UNIT) CAPSULE    Take 50,000 Units by mouth every seven (7) days. HYDROCODONE-ACETAMINOPHEN (XODOL) 5-300 MG TABLET    Take 1 Tab by mouth every six (6) hours as needed for Pain for up to 7 days. Max Daily Amount: 4 Tabs. VITAMIN B COMPLEX PO    Take  by mouth. These Medications have changed    No medications on file   Stop Taking    No medications on file       Jose R Chapman DO   Emergency Medicine   December 20, 2020, 9:42 AM     This note is dictated utilizing Dragon voice recognition software. Unfortunately this leads to occasional typographical errors using the voice recognition. I apologize in advance if the situation occurs. If questions occur please do not hesitate to contact me directly.     Jose R Chapman DO

## 2020-12-20 NOTE — CONSULTS
[facility]  (A Division of [address] [place] [address])      Consultation Note      Date of Admission: 12/20/2020    Date of Consultation: 12/20/2020    Referred by: Dr. Wilton Sanford    Reason for Referral: covid      Current Antimicrobials:    Prior Antimicrobials:  Remdesivir 12/20 - #1        Assessment: Rec / Plan:   Covid Pneumonia  - participated in maskless Bible study group around 12/6.  4/6 participants developed Covid  - runny nose, malaise, chills seats, shortness of breath  - CXR 12/20: multifocal ASD -> agree with Remdesivir  -> start decadron 6 mg daily x 10 days  -> OK to hold antibacterial agents at this time   Acute Hypoxic Respiratory Failure  - O2 sat 84% on arrival at ED -> O2 as needed         Microbiology:      Lines / Catheters:      HPI:  60year-old  female with no significant PMH admitted to [facility] for cough and shortness of breath. She had attended a Bible study group with 5 or 6 other unmasked participants around 2 weeks PTA and 3 days later felt like she had a \"cold\" with stuffy nose, malaise. 4 other participants developed COVID. She had a covid test done on 12/11 (9 days PTA) and this returned positive. Around two days PTA she had chills and sweats and became progressively short of breath and today called EMS who found her O2 sat at 88%. On arrival at the ED it was 84% and she was placed on 6 lpm NC O2%. CXR showed multifocal airspace disease. Remdesivir has been started. Active Hospital Problems    Diagnosis Date Noted    Pneumonia due to COVID-19 virus 12/20/2020     Past Medical History:   Diagnosis Date    Nausea & vomiting      Past Surgical History:   Procedure Laterality Date    HX COLONOSCOPY      HX GYN Left     oophorectomy    HX TONSILLECTOMY      HX UROLOGICAL      Lithotripsy    HX UROLOGICAL      Cystoscopy     History reviewed. No pertinent family history.   Social History     Socioeconomic History    Marital status:      Spouse name: Not on file    Number of children: Not on file    Years of education: Not on file    Highest education level: Not on file   Occupational History    Not on file   Social Needs    Financial resource strain: Not on file    Food insecurity     Worry: Not on file     Inability: Not on file    Transportation needs     Medical: Not on file     Non-medical: Not on file   Tobacco Use    Smoking status: Former Smoker     Quit date: 1991     Years since quittin.4   Substance and Sexual Activity    Alcohol use: Never     Frequency: Never    Drug use: Not Currently     Types: Hallucinogenics, Marijuana     Comment: in her 25s    Sexual activity: Not on file   Lifestyle    Physical activity     Days per week: Not on file     Minutes per session: Not on file    Stress: Not on file   Relationships    Social connections     Talks on phone: Not on file     Gets together: Not on file     Attends [employer] service: Not on file     Active member of club or organization: Not on file     Attends meetings of clubs or organizations: Not on file     Relationship status: Not on file    Intimate partner violence     Fear of current or ex partner: Not on file     Emotionally abused: Not on file     Physically abused: Not on file     Forced sexual activity: Not on file   Other Topics Concern    Not on file   Social History Narrative    Not on file       Allergies:  Patient has no known allergies. Medications:  Current Facility-Administered Medications   Medication Dose Route Frequency    remdesivir 200 mg in 0.9% sodium chloride 250 mL IVPB  200 mg IntraVENous ONCE    Followed by   Anell Mouse ON 2020] remdesivir 100 mg in 0.9% sodium chloride 250 mL IVPB  100 mg IntraVENous Q24H        ROS:  A comprehensive review of systems was negative except for that written in the History of Present Illness.      Physical Exam:    HPI:  Temp (24hrs), Av °F (36.7 °C), Min:98 °F (36.7 °C), Max:98 °F (36.7 °C)    Visit Vitals  BP (!) 116/50   Pulse 68   Temp 98 °F (36.7 °C)   Resp 21   Ht 5' 7\" (1.702 m)   Wt 79.4 kg (175 lb)   SpO2 97%   BMI 27.41 kg/m²       General: Well developed, well nourished 60 y.o. WHITE OR  female in no acute distress. ENT: ENT exam normal, no neck nodes or sinus tenderness  Head: normocephalic, without obvious abnormality  Mouth:  mucous membranes moist, pharynx normal without lesions  Neck: supple, symmetrical, trachea midline   Cardio:  regular rate and rhythm, S1, S2 normal, no murmur, click, rub or gallop  Chest: inspection normal - no chest wall deformities or tenderness, respiratory effort normal  Lungs: bilateral scattered crackles, no wheezes  Abdomen: soft, non-tender. Bowel sounds normal. No masses, no organomegaly.   Extremities:  no redness or tenderness in the calves or thighs, no edema  Neuro: Grossly normal       Lab results:    Chemistry  Recent Labs     12/20/20  0914   *      K 3.4*      CO2 31   BUN 11   CREA 0.61   CA 9.2   AGAP 4   BUCR 18   *   TP 7.7   ALB 3.1*   GLOB 4.6*   AGRAT 0.7*       CBC w/ Diff  Recent Labs     12/20/20  0914   WBC 4.4*   RBC 4.53   HGB 12.9   HCT 39.6      GRANS 73   LYMPH 20*   EOS 1       Microbiology  All Micro Results     None             Whitley Trujillo MD, [facility]  Infectious Diseases  [phone]  12/20/2020   2:25 PM

## 2020-12-20 NOTE — ED NOTES
TRANSFER - ED to INPATIENT REPORT:    Verbal report given to Ritu Dougherty RN (name) on Stephen Blank  being transferred to ICU (unit) for routine progression of care       Report consisted of patients Situation, Background, Assessment and   Recommendations(SBAR). SBAR report made available to receiving floor on this patient being transferred to  [phone] /2800)  for routine progression of care       Admitting diagnosis Pneumonia due to COVID-19 virus [U07.1, J12.89]    Information from the following report(s) SBAR, Kardex, ED Summary, MAR and Recent Results was made available to receiving floor. Lines:   Peripheral IV 12/20/20 Right Antecubital (Active)   Site Assessment Clean, dry, & intact 12/20/20 0922   Phlebitis Assessment 0 12/20/20 0922   Infiltration Assessment 0 12/20/20 0922   Dressing Status Clean, dry, & intact 12/20/20 9672   Dressing Type Transparent 12/20/20 0922   Hub Color/Line Status Green;Flushed;Patent 12/20/20 0922        HCG Status for ALL Females under 55 y/o: NO     Medication list unable to confirm    Opportunity for questions and clarification was provided.       Patient is oriented to time, place, person and situation Isolation precautions for COVID +  Patient is  continent and ambulatory without assist     Valuables transported with patient     Patient transported with:   Monitor  O2 @ 4 liters  Registered Nurse  Tech    MAP (Monitor): 66 =Monitored (most recent)  Vitals w/ MEWS Score (last day)     Date/Time MEWS Score Pulse Resp Temp BP Level of Consciousness SpO2    12/20/20 1312              97 %    12/20/20 1305              98 %    12/20/20 1300          (!) 116/50    98 %    12/20/20 1245          (!) 117/54    97 %    12/20/20 1230          112/67    95 %    12/20/20 1215          (!) 110/43    100 %    12/20/20 1200          (!) 121/58    100 %    12/20/20 1121              97 %    12/20/20 1120              98 %    12/20/20 1119             98 %    12/20/20 1118              97 %    12/20/20 1117          (!) 123/44    97 %    12/20/20 1100          (!) 118/56    100 %    12/20/20 1045              99 %    12/20/20 1030          (!) 118/58    99 %    12/20/20 1015          (!) 115/59    100 %    12/20/20 1000          (!) 108/52    97 %    12/20/20 0945              95 %    12/20/20 0930          (!) 109/57    96 %    12/20/20 09:22:10              97 %    12/20/20 0918  2  68  21  98 °F (36.7 °C)  (!) 107/55  Alert  98 %    12/20/20 0917              (!) 89 %    12/20/20 0915          (!) 107/55    96 %

## 2020-12-20 NOTE — PROGRESS NOTES
Pharmacy Services - Remdesivir      Completed chart review for [facility]. Patient meets all inclusion criteria for use and zero exclusion criteria for remdesivir per ministry guidelines outlined below. ID consulted and approved. Inclusion (must meet both criteria)  1. Adults, children (?3.5Kg, only use lyophilized powder), or pregnant women with proven COVID-19 as defined by a positive nasopharyngeal swab, tracheal aspirate or sputum SARS-CoV-2 PCR or a positive serology test requiring hospitalization for COVID-19-severe pneumonia. a. Patients should not be hospitalized solely with the intent of receiving remdesivir without meeting clinical criteria requiring hospitalization. 2. Requiring supplemental oxygen*      Exclusion Criteria (Patients who would not be eligible for RDV therapy) - Present at initiation of therapy :  1. Requiring invasive or non-invasive mechanical ventilation**   a. Consider use in patients requiring high-flow oxygen early in the disease state (based on symptom duration)   2. Use of more than 1 vasopressor agent prior to start of remdesivir   3. Already improving on current treatment/supportive regimen as evidenced by improving oxygenation, and/or impending discharge   4. Patients in whom the clinical team think death is in the immediate short-term whereby administration of RDV unlikely to change clinical outcome         \"Pt states positive covid test on 12/11/20. Has since had worsening sob. Pt 89% on room air. Pt improved to 95+ % with nasal cannula 5L/min. \"        Other considerations:  - Prioritize patients who present with symptom onset < 14 days and within 10 days of acute care admission  - Special populations:  o GFR < 30mL/min (Cockcroft-Gault formula for patients ? 18 years or [address] [place] [address] for patients < 25years of age) :  Use with caution due to risk of cyclodextrin toxicity with IV solution as it accumulates in reduced renal clearance   - If therapy is stopped and later restarted, there is no need to reload patient, dosing should continue with dose where it was stopped to complete the 5-day course. - If a patient has clinically improved and is ready for discharge, completion of remdesivir course should not be the sole reason for continued admission     Dosing Regimen and Duration of Therapy:  - 200mg on day 1, followed by 100mg daily x 4 days (5 days is max treatment duration) =  (6 vials)  o IV infusion over 30 minutes  - 0.9% Sodium Chloride 30mL IV flush daily to follow each remdesivir dose x 5 days    Monitoring Parameters:   - CMP daily x 5 days   o Stop remdesivir if LFTs substantially increase following initiation of remdesivir (ie: 5x baseline lab values at admission or most recent available LFT lab values)   - CBC daily x 5 days        Please contact pharmacy directly with any questions. Thank you.      Colette Denver, [facility]  Clinical Pharmacist  [phone]

## 2020-12-20 NOTE — H&P
Internal Medicine History and Physical          Subjective     HPI: Linda Barrera is a 60 y.o. female with no sig PMHx who presented to the ED with acute onset of chest pain/cough/SOB. The patient states about 2 weeks ago she attended a Bible meeting with 4 other people. They were unmasked at the time. About 3 days afterwards, she developed mild sx consisting of runny nose and some fatigue. She then found out others in the study group had similar sx. They then started testing positive for CoVID. Her sx continued through this past week. She got tested on the  and was told it was positive. 2 days ago she developed chest pain and feeling like she couldn't take a deep breath. This was along with sx such as chills/sweats. She called EMS due to worsening sx and was found to be hypoxemic at 88% on room air. In the ED, she was down to 84%. She was placed on NC and sats improved into mid-90s. CXR showed multifocal PNA along with positive rapid CoVID. She was given IV decadron and Remdesivir.      PMHx:  Past Medical History:   Diagnosis Date    Nausea & vomiting        PSurgHx:  Past Surgical History:   Procedure Laterality Date    HX COLONOSCOPY      HX GYN Left     oophorectomy    HX TONSILLECTOMY      HX UROLOGICAL      Lithotripsy    HX UROLOGICAL      Cystoscopy       SocialHx:  Social History     Socioeconomic History    Marital status:      Spouse name: Not on file    Number of children: Not on file    Years of education: Not on file    Highest education level: Not on file   Occupational History    Not on file   Social Needs    Financial resource strain: Not on file    Food insecurity     Worry: Not on file     Inability: Not on file    Transportation needs     Medical: Not on file     Non-medical: Not on file   Tobacco Use    Smoking status: Former Smoker     Quit date: 1991     Years since quittin.4   Substance and Sexual Activity    Alcohol use: Never     Frequency: Never    Drug use: Not Currently     Types: Hallucinogenics, Marijuana     Comment: in her 25s    Sexual activity: Not on file   Lifestyle    Physical activity     Days per week: Not on file     Minutes per session: Not on file    Stress: Not on file   Relationships    Social connections     Talks on phone: Not on file     Gets together: Not on file     Attends Congregation service: Not on file     Active member of club or organization: Not on file     Attends meetings of clubs or organizations: Not on file     Relationship status: Not on file    Intimate partner violence     Fear of current or ex partner: Not on file     Emotionally abused: Not on file     Physically abused: Not on file     Forced sexual activity: Not on file   Other Topics Concern    Not on file   Social History Narrative    Not on file       Allergies:  No Known Allergies    FamilyHx:  History reviewed. No pertinent family history. Prior to Admission Medications   Prescriptions Last Dose Informant Patient Reported? Taking? HYDROcodone-acetaminophen (XODOL) 5-300 mg tablet   No No   Sig: Take 1 Tab by mouth every six (6) hours as needed for Pain for up to 7 days. Max Daily Amount: 4 Tabs. VITAMIN B COMPLEX PO   Yes No   Sig: Take  by mouth. diphenhydrAMINE (BenadryL) 25 mg capsule   Yes No   Sig: Take 25 mg by mouth nightly as needed. ergocalciferol (Vitamin D2) 1,250 mcg (50,000 unit) capsule   Yes No   Sig: Take 50,000 Units by mouth every seven (7) days.       Facility-Administered Medications: None       Review of Systems:  CONST: Fever, weight loss, fatigue or chills  HEENT: Recent changes in vision, vertigo, epistaxis, dysphagia and hoarseness  CV: Chest pain, palpitations, edema and varicosities  RESP: Cough, shortness of breath, wheezing, hemoptysis, snoring and reactive airway disease  GI: Nausea, vomiting, abdominal pain, change in bowel habits, hematochezia, melena, and GERD   : Hematuria, dysuria, frequency, urgency, nocturia and stress urinary incontinence   MS: Weakness, joint pain and arthritis  ENDO: Polyuria, polydipsia, polyphagia, poor wound healing, heat intolerance, cold intolerance  LYMPH/HEME: Anemia, bruising and history of blood transfusions  INTEG: Dermatitis, abnormal moles  NEURO: Dizziness, headache, fainting, seizures and stroke  PSYCH: Anxiety and depression      Objective      Visit Vitals  BP (!) 101/45   Pulse 78   Temp 98 °F (36.7 °C)   Resp 23   Ht 5' 7\" (1.702 m)   Wt 79.4 kg (175 lb)   SpO2 91%   BMI 27.41 kg/m²       Physical Exam:  General Appearance: NAD, conversant  HENT: normocephalic/atraumatic, moist mucus membranes  Lungs: Bilateral rhonchi w/ increased respiratory effort  Cardiovascular: RRR, no m/r/g, capillary refill < 2 sec, B/L DP/PT pulses +3/4  Abdomen: soft, non-tender, normal bowel sounds  Extremities: no cyanosis, no peripheral edema  Neuro: moves all extremities, no focal deficits  Psych: appropriate affect, alert and oriented to person, place and time    Laboratory Studies:  CMP:   Lab Results   Component Value Date/Time     12/20/2020 09:14 AM    K 3.4 (L) 12/20/2020 09:14 AM     12/20/2020 09:14 AM    CO2 31 12/20/2020 09:14 AM    AGAP 4 12/20/2020 09:14 AM     (H) 12/20/2020 09:14 AM    BUN 11 12/20/2020 09:14 AM    CREA 0.61 12/20/2020 09:14 AM    GFRAA >60 12/20/2020 09:14 AM    GFRNA >60 12/20/2020 09:14 AM    CA 9.2 12/20/2020 09:14 AM    ALB 3.1 (L) 12/20/2020 09:14 AM    TP 7.7 12/20/2020 09:14 AM    GLOB 4.6 (H) 12/20/2020 09:14 AM    AGRAT 0.7 (L) 12/20/2020 09:14 AM    ALT 48 12/20/2020 09:14 AM     CBC:   Lab Results   Component Value Date/Time    WBC 4.4 (L) 12/20/2020 09:14 AM    HGB 12.9 12/20/2020 09:14 AM    HCT 39.6 12/20/2020 09:14 AM     12/20/2020 09:14 AM       Imaging Reviewed:  Cta Chest W Or W Wo Cont    Result Date: 12/20/2020  EXAM: CTA CHEST W OR W WO CONT CLINICAL INDICATION/HISTORY: COVID +; Eval for PE COMPARISON: Correlation with chest x-ray performed the same day. TECHNIQUE: Axial CT imaging from the thoracic inlet through the diaphragm with intravenous contrast. Coronal and sagittal MIP reformats were generated. One or more dose reduction techniques were used on this CT: automated exposure control, adjustment of the mAs and/or kVp according to patient size, and iterative reconstruction techniques. The specific techniques used on this CT exam have been documented in the patient's electronic medical record. Digital Imaging and Communications in Medicine (DICOM) format image data are available to nonaffiliated external healthcare facilities or entities on a secure, media free, reciprocally searchable basis with patient authorization for at least a 12-month period after this study. _______________ FINDINGS: EXAM QUALITY: Adequate for diagnosis. PULMONARY ARTERIES: No evidence of pulmonary embolism. MEDIASTINUM: Normal heart size. No evidence of right heart strain. Aorta is unremarkable. No pericardial effusion. LUNGS: There are scattered predominantly peripheral based groundglass infiltrates and areas of consolidation more pronounced in the mid to lower lung zones PLEURA: No significant pleural effusions. AIRWAY: Normal. LYMPH NODES: Several prominent paratracheal and slightly enlarged subcarinal lymph nodes measuring up to 10 mm in short axis are likely reactive. UPPER ABDOMEN: Small hiatal hernia. OTHER: No acute or aggressive osseous abnormalities identified. _______________     IMPRESSION: 1. No evidence of pulmonary embolism. 2. Multifocal airspace disease with distribution as above, in keeping with patient history of known COVID 19 pneumonia. Xr Chest Port    Result Date: 12/20/2020  EXAM: XR CHEST PORT CLINICAL INDICATION/HISTORY: resp distress   > Additional: None. COMPARISON: None. TECHNIQUE: Portable chest _______________ FINDINGS: SUPPORT DEVICES: None. HEART AND MEDIASTINUM: Normal size and contour.  Normal pulmonary vasculature. Atherosclerotic calcifications in the aortic arch. LUNGS AND PLEURAL SPACES: Multifocal groundglass and bandlike parenchymal opacities predominantly along the periphery of the mid to lower lung zones BONY THORAX AND SOFT TISSUES: No acute osseous abnormality. _______________     IMPRESSION: Multifocal airspace disease as above in keeping with patient history of COVID pneumonia. Assessment/Plan     Active Hospital Problems    Diagnosis Date Noted    Pneumonia due to COVID-19 virus 12/20/2020    Acute hypoxemic respiratory failure due to COVID-19 [facility]) 12/20/2020    Hypokalemia 12/20/2020     - Cont supportive care  - Cont O2, HFNC if needed  - Cont IV decadron  - Remdesivir  - Convalescent plasma as per ID  - Replete lytes  - Cont acceptable home medications for chronic conditions   - DVT protocol    I have personally reviewed all pertinent labs, films and EKGs that have officially resulted. I reviewed available electronic documentation outlining the initial presentation as well as the emergency room physician's encounter.     Trina Thurman, DO  Internal Medicine, Hospitalist  Pager: [phone] [facility]

## 2020-12-20 NOTE — PROGRESS NOTES
Problem: General Medical Care Plan  Goal: *Vital signs within specified parameters  Outcome: Progressing Towards Goal  Goal: *Labs within defined limits  Outcome: Progressing Towards Goal  Goal: *Absence of infection signs and symptoms  Outcome: Progressing Towards Goal  Goal: *Optimal pain control at patient's stated goal  Outcome: Progressing Towards Goal     Problem: Breathing Pattern - Ineffective  Goal: *Absence of hypoxia  Outcome: Progressing Towards Goal  Goal: *Use of effective breathing techniques  Outcome: Progressing Towards Goal

## 2020-12-20 NOTE — ACP (ADVANCE CARE PLANNING)
12/20/2020  Unable to complete ACP note due to MD coming into pt's room and wanting to speak with her. Patient thinks she may have done ACP in past but does not think her  could find it. She says she would like her daughter Van Dey to be MPOA#[phone]- [phone] - I guess that is home number. Patient says daughter would be the calmest in this situation. She wants her  to be MPOA #2 Andree Gonsalves [phone]. I did not have a chance to tell pt that she can make a legal AC with the [employer] to reflect these MPOA's in the sequence she would want. Caio Harden.  ALEX ValenteN  [facility]  [phone], Pager [phone]  [email or web address]

## 2020-12-20 NOTE — ED TRIAGE NOTES
Pt arrives to ed via ems for c/o resp distress. Pt states postivie covid test on 12/11/20. Has since had worsening sob. Pt 89% on room air.   Pt improved to 95+ % with nasal cannula 5L/min

## 2020-12-21 LAB
ALBUMIN SERPL-MCNC: 2.9 G/DL (ref 3.4–5)
ALBUMIN/GLOB SERPL: 0.8 {RATIO} (ref 0.8–1.7)
ALP SERPL-CCNC: 308 U/L (ref 45–117)
ALT SERPL-CCNC: 53 U/L (ref 13–56)
ANION GAP SERPL CALC-SCNC: 8 MMOL/L (ref 3–18)
AST SERPL-CCNC: 53 U/L (ref 10–38)
BASOPHILS # BLD: 0 K/UL (ref 0–0.1)
BASOPHILS NFR BLD: 0 % (ref 0–2)
BILIRUB SERPL-MCNC: 0.5 MG/DL (ref 0.2–1)
BUN SERPL-MCNC: 12 MG/DL (ref 7–18)
BUN/CREAT SERPL: 29 (ref 12–20)
CALCIUM SERPL-MCNC: 9 MG/DL (ref 8.5–10.1)
CHLORIDE SERPL-SCNC: 105 MMOL/L (ref 100–111)
CO2 SERPL-SCNC: 27 MMOL/L (ref 21–32)
CREAT SERPL-MCNC: 0.42 MG/DL (ref 0.6–1.3)
DIFFERENTIAL METHOD BLD: ABNORMAL
EOSINOPHIL # BLD: 0 K/UL (ref 0–0.4)
EOSINOPHIL NFR BLD: 0 % (ref 0–5)
ERYTHROCYTE [DISTWIDTH] IN BLOOD BY AUTOMATED COUNT: 12.1 % (ref 11.6–14.5)
GLOBULIN SER CALC-MCNC: 3.6 G/DL (ref 2–4)
GLUCOSE SERPL-MCNC: 116 MG/DL (ref 74–99)
HCT VFR BLD AUTO: 35.7 % (ref 35–45)
HGB BLD-MCNC: 11.6 G/DL (ref 12–16)
LYMPHOCYTES # BLD: 0.9 K/UL (ref 0.9–3.6)
LYMPHOCYTES NFR BLD: 16 % (ref 21–52)
MCH RBC QN AUTO: 28.2 PG (ref 24–34)
MCHC RBC AUTO-ENTMCNC: 32.5 G/DL (ref 31–37)
MCV RBC AUTO: 86.7 FL (ref 74–97)
MONOCYTES # BLD: 0.5 K/UL (ref 0.05–1.2)
MONOCYTES NFR BLD: 9 % (ref 3–10)
NEUTS SEG # BLD: 4 K/UL (ref 1.8–8)
NEUTS SEG NFR BLD: 75 % (ref 40–73)
PLATELET # BLD AUTO: 354 K/UL (ref 135–420)
PMV BLD AUTO: 9.8 FL (ref 9.2–11.8)
POTASSIUM SERPL-SCNC: 4.1 MMOL/L (ref 3.5–5.5)
PROT SERPL-MCNC: 6.5 G/DL (ref 6.4–8.2)
RBC # BLD AUTO: 4.12 M/UL (ref 4.2–5.3)
SODIUM SERPL-SCNC: 140 MMOL/L (ref 136–145)
WBC # BLD AUTO: 5.4 K/UL (ref 4.6–13.2)

## 2020-12-21 PROCEDURE — 85025 COMPLETE CBC W/AUTO DIFF WBC: CPT

## 2020-12-21 PROCEDURE — 74011250637 HC RX REV CODE- 250/637: Performed by: PHYSICIAN ASSISTANT

## 2020-12-21 PROCEDURE — 74011250636 HC RX REV CODE- 250/636: Performed by: PHYSICIAN ASSISTANT

## 2020-12-21 PROCEDURE — 74011000250 HC RX REV CODE- 250: Performed by: PHYSICIAN ASSISTANT

## 2020-12-21 PROCEDURE — 80053 COMPREHEN METABOLIC PANEL: CPT

## 2020-12-21 PROCEDURE — 2709999900 HC NON-CHARGEABLE SUPPLY

## 2020-12-21 PROCEDURE — 74011000258 HC RX REV CODE- 258: Performed by: PHYSICIAN ASSISTANT

## 2020-12-21 PROCEDURE — 74011250636 HC RX REV CODE- 250/636: Performed by: INTERNAL MEDICINE

## 2020-12-21 PROCEDURE — 65660000000 HC RM CCU STEPDOWN

## 2020-12-21 RX ORDER — SODIUM CHLORIDE 0.9 % (FLUSH) 0.9 %
30 SYRINGE (ML) INJECTION DAILY
Status: COMPLETED | OUTPATIENT
Start: 2020-12-21 | End: 2020-12-24

## 2020-12-21 RX ADMIN — DIPHENHYDRAMINE HYDROCHLORIDE 25 MG: 25 CAPSULE ORAL at 22:32

## 2020-12-21 RX ADMIN — Medication 10 ML: at 22:32

## 2020-12-21 RX ADMIN — ENOXAPARIN SODIUM 30 MG: 30 INJECTION SUBCUTANEOUS at 09:20

## 2020-12-21 RX ADMIN — ENOXAPARIN SODIUM 30 MG: 30 INJECTION SUBCUTANEOUS at 02:10

## 2020-12-21 RX ADMIN — Medication 30 ML: at 18:08

## 2020-12-21 RX ADMIN — REMDESIVIR 100 MG: 100 INJECTION, POWDER, LYOPHILIZED, FOR SOLUTION INTRAVENOUS at 14:31

## 2020-12-21 RX ADMIN — ACETAMINOPHEN 650 MG: 325 TABLET ORAL at 05:36

## 2020-12-21 RX ADMIN — Medication 10 ML: at 14:32

## 2020-12-21 RX ADMIN — ACETAMINOPHEN 650 MG: 325 TABLET ORAL at 10:52

## 2020-12-21 RX ADMIN — DEXAMETHASONE 6 MG: 4 TABLET ORAL at 09:20

## 2020-12-21 NOTE — PROGRESS NOTES
Bedside and Verbal shift change report given to [address] (oncoming nurse) by Nick Vernon RN (offgoing nurse). Report included the following information SBAR, Kardex, Intake/Output, MAR and Recent Results.     0800-Assessment completed    0920-schedule meds given, pt tolerated well    1052-Tylenol 650mg po given to pt for HA, see flowsheet    1150-pain reassessment    1200-reassessment , no change    1431-scheduled med given

## 2020-12-21 NOTE — PROGRESS NOTES
Problem: Discharge Planning  Goal: *Discharge to safe environment  Outcome: Progressing Towards Goal     Problem: General Medical Care Plan  Goal: *Vital signs within specified parameters  Outcome: Progressing Towards Goal  Goal: *Labs within defined limits  Outcome: Progressing Towards Goal  Goal: *Absence of infection signs and symptoms  Outcome: Progressing Towards Goal  Goal: *Optimal pain control at patient's stated goal  Outcome: Progressing Towards Goal  Goal: *Skin integrity maintained  Outcome: Progressing Towards Goal  Goal: *Fluid volume balance  Outcome: Progressing Towards Goal  Goal: *Optimize nutritional status  Outcome: Progressing Towards Goal  Goal: *Anxiety reduced or absent  Outcome: Progressing Towards Goal  Goal: *Progressive mobility and function (eg: ADL's)  Outcome: Progressing Towards Goal     Problem: Patient Education: Go to Patient Education Activity  Goal: Patient/Family Education  Outcome: Progressing Towards Goal     Problem: Breathing Pattern - Ineffective  Goal: *Absence of hypoxia  Outcome: Progressing Towards Goal  Goal: *Use of effective breathing techniques  Outcome: Progressing Towards Goal  Goal: *PALLIATIVE CARE:  Alleviation of Dyspnea  Outcome: Progressing Towards Goal     Problem: Patient Education: Go to Patient Education Activity  Goal: Patient/Family Education  Outcome: Progressing Towards Goal

## 2020-12-21 NOTE — PROGRESS NOTES
[facility]  (A Division of [address] [place] [address])    Follow-up Note      Date of Admission: 12/20/2020       Date of Note:  12/21/2020    Summary:    61 y/o CF w/ no significant PMH adm 12/20 for cough and shortness of breath. Had attended Bible study group with 5 or 6 other unmasked participants around 2 weeks PTA and 3 days later felt like she had a \"cold\" with stuffy nose, malaise. 4 other participants developed COVID. She had a covid test done on 12/11 (9 days PTA) and this returned positive. Around two days PTA she had chills and sweats and became progressively short of breath and today called EMS who found her O2 sat at 88%. On arrival at the ED it was 84% and she was placed on 6 lpm NC O2%. CXR showed multifocal airspace disease. Remdesivir has been started. Interval History:  Feels better today. Says she had a pain on deep inspiration PTA which is gone now. Afebrile.   O2 sats 87-92% on 4 lpm NC       Current Antimicrobials:    Prior Antimicrobials:  Remdesivir 12/20 - #2        Assessment: Rec / Plan:   Covid Pneumonia  - participated in maskless Bible study group around 12/6.  4/6 participants developed Covid  - runny nose, malaise, chills seats, shortness of breath since ~12/9  - SARS Cov2 JADE + 12/11 (outside) , +12/20 José Miguel Abdul  - CXR 12/20: multifocal ASD  - remdesivir, decadron started 12/20 -> continue remdesivir, decadron  -> monitor of antibacterials   Acute Hypoxic Respiratory failure  - O2 sat 84% on arrival at ED  - now on 4 lpm NC -> O2 as needed       Microbiology:      Lines / Catheters:        Patient Active Problem List   Diagnosis Code    Pneumonia due to COVID-19 virus U07.1, J12.89    Acute hypoxemic respiratory failure due to COVID-19 ([facility]) U07.1, J96.01    Hypokalemia E87.6       Current Facility-Administered Medications   Medication Dose Route Frequency    sodium chloride (NS) flush 30 mL  30 mL IntraVENous DAILY    remdesivir 100 mg in 0.9% sodium chloride 250 mL IVPB  100 mg IntraVENous Q24H    diphenhydrAMINE (BENADRYL) capsule 25 mg  25 mg Oral QHS PRN    sodium chloride (NS) flush 5-40 mL  5-40 mL IntraVENous Q8H    sodium chloride (NS) flush 5-40 mL  5-40 mL IntraVENous PRN    acetaminophen (TYLENOL) tablet 650 mg  650 mg Oral Q6H PRN    Or    acetaminophen (TYLENOL) suppository 650 mg  650 mg Rectal Q6H PRN    polyethylene glycol (MIRALAX) packet 17 g  17 g Oral DAILY PRN    promethazine (PHENERGAN) tablet 12.5 mg  12.5 mg Oral Q6H PRN    Or    ondansetron (ZOFRAN) injection 4 mg  4 mg IntraVENous Q6H PRN    dexAMETHasone (DECADRON) tablet 6 mg  6 mg Oral DAILY    enoxaparin (LOVENOX) injection 30 mg  30 mg SubCUTAneous Q12H         Review of Systems - Negative except as in interval history       Objective:    Visit Vitals  BP (!) 106/49   Pulse (!) 57   Temp 97.7 °F (36.5 °C)   Resp 24   Ht 5' 7\" (1.702 m)   Wt 81.2 kg (179 lb)   SpO2 92%   BMI 28.04 kg/m²       Temp (24hrs), Av.8 °F (36.6 °C), Min:97.7 °F (36.5 °C), Max:98 °F (36.7 °C)      General: Well developed, well nourished 60 y.o. WHITE female in no acute distress. ENT: ENT exam normal, no neck nodes or sinus tenderness  Head: normocephalic, without obvious abnormality  Mouth:  mucous membranes moist, pharynx normal without lesions  Neck: supple, symmetrical, trachea midline   Cardio:  regular rate and rhythm, S1, S2 normal, no murmur, click, rub or gallop  Chest: inspection normal - no chest wall deformities or tenderness, respiratory effort normal  Lungs: bilateral scattered crackles, no wheezes  Abdomen: soft, non-tender. Bowel sounds normal. No masses, no organomegaly.   Extremities:  no redness or tenderness in the calves or thighs, no edema       Lab results:    Chemistry  Recent Labs     20  0400 20  0914   * 102*    139   K 4.1 3.4*    104   CO2 27 31   BUN 12 11   CREA 0.42* 0.61   CA 9.0 9.2   AGAP 8 4   BUCR 29* 18   AP 308* 298*   TP 6.5 7.7   ALB 2.9* 3.1*   GLOB 3.6 4.6*   AGRAT 0.8 0.7*       CBC w/ Diff  Recent Labs     12/21/20  0400 12/20/20  0914   WBC 5.4 4.4*   RBC 4.12* 4.53   HGB 11.6* 12.9   HCT 35.7 39.6    300   GRANS 75* 73   LYMPH 16* 20*   EOS 0 1       Microbiology  All Micro Results     None             Checo Glaser MD, [facility]  Infectious Diseases  [phone]  12/21/2020   1:06 PM

## 2020-12-21 NOTE — ROUTINE PROCESS
Assumed care from Valentino Stovall. Sitting in bed on phone and watching TV. 
 
0000  Vss. No changes. Patient sleeping 
 
0400  VSS. No changes. Labs drawn Bedside shift change report given to juliane (oncoming nurse) by Neha Luz RN 
 (offgoing nurse). Report included the following information SBAR, MAR and Recent Results.

## 2020-12-21 NOTE — PROGRESS NOTES
Internal Medicine Progress Note    Patient's Name: Linda Barrera  Admit Date: 12/20/2020  Length of Stay: 1      Assessment/Plan     Active Hospital Problems    Diagnosis Date Noted    Pneumonia due to COVID-19 virus 12/20/2020    Acute hypoxemic respiratory failure due to COVID-19 [facility]) 12/20/2020    Hypokalemia 12/20/2020     - Cont supportive care  - Cont O2, HFNC if needed  - Cont IV decadron  - Remdesivir  - Convalescent plasma as per ID  - Replete lytes  - Cont acceptable home medications for chronic conditions   - DVT protocol    I have personally reviewed all pertinent labs and films that have officially resulted over the last 24 hours. I have personally checked for all pending labs that are awaiting final results. Subjective     Pt s/e @ bedside  No major events overnight  On nasal canula  Feeling a little better in terms SOB  Has headache  Denies CP    Objective     Visit Vitals  BP (!) 106/49   Pulse (!) 57   Temp 97.7 °F (36.5 °C)   Resp 24   Ht 5' 7\" (1.702 m)   Wt 81.2 kg (179 lb)   SpO2 92%   BMI 28.04 kg/m²       Physical Exam:  General Appearance: NAD, conversant  Lungs: B/L rhonchi with normal respiratory effort  CV: RRR, no m/r/g  Abdomen: soft, non-tender, normal bowel sounds  Extremities: no cyanosis, no peripheral edema  Neuro: No focal deficits, motor/sensory intact    Lab/Data Reviewed:  BMP:   Lab Results   Component Value Date/Time     12/21/2020 04:00 AM    K 4.1 12/21/2020 04:00 AM     12/21/2020 04:00 AM    CO2 27 12/21/2020 04:00 AM    AGAP 8 12/21/2020 04:00 AM     (H) 12/21/2020 04:00 AM    BUN 12 12/21/2020 04:00 AM    CREA 0.42 (L) 12/21/2020 04:00 AM    GFRAA >60 12/21/2020 04:00 AM    GFRNA >60 12/21/2020 04:00 AM     CBC:   Lab Results   Component Value Date/Time    WBC 5.4 12/21/2020 04:00 AM    HGB 11.6 (L) 12/21/2020 04:00 AM    HCT 35.7 12/21/2020 04:00 AM     12/21/2020 04:00 AM       Imaging Reviewed:  No results found.     Medications Reviewed:  Current Facility-Administered Medications   Medication Dose Route Frequency    sodium chloride (NS) flush 30 mL  30 mL IntraVENous DAILY    remdesivir 100 mg in 0.9% sodium chloride 250 mL IVPB  100 mg IntraVENous Q24H    diphenhydrAMINE (BENADRYL) capsule 25 mg  25 mg Oral QHS PRN    sodium chloride (NS) flush 5-40 mL  5-40 mL IntraVENous Q8H    sodium chloride (NS) flush 5-40 mL  5-40 mL IntraVENous PRN    acetaminophen (TYLENOL) tablet 650 mg  650 mg Oral Q6H PRN    Or    acetaminophen (TYLENOL) suppository 650 mg  650 mg Rectal Q6H PRN    polyethylene glycol (MIRALAX) packet 17 g  17 g Oral DAILY PRN    promethazine (PHENERGAN) tablet 12.5 mg  12.5 mg Oral Q6H PRN    Or    ondansetron (ZOFRAN) injection 4 mg  4 mg IntraVENous Q6H PRN    dexAMETHasone (DECADRON) tablet 6 mg  6 mg Oral DAILY    enoxaparin (LOVENOX) injection 30 mg  30 mg SubCUTAneous Q12H           Arthur Fox DO  Internal Medicine, Hospitalist  Pager: [phone]  [address] [place] [address]

## 2020-12-21 NOTE — DIABETES MGMT
Initial Nutrition Assessment and Glycemic Control Plan of Care    Type and Reason for Visit: Initial    Nutrition Recommendations/Plan:   Monitor po intake, labs, weights. Nutrition Assessment:     Pt is 130% ideal weight. Pt is well nourished and po intake is now adequate despite decreased po intake pt reports she experienced PTA. Malnutrition Assessment:  Malnutrition Status:  No malnutrition    Estimated Daily Nutrient Needs:  Energy (kcal): 8452; Weight Used for Energy Requirements:    Protein (g): 81; Weight Used for Protein Requirements: Current  Fluid (ml/day): 1700; Method Used for Fluid Requirements: 1 ml/kcal  Nutrition Related Findings:  Pt reports her appetite has improved today and is no longer experiencing nausea. Medications include Decadron. Wounds:    None     Current Nutrition Therapies:  DIET REGULAR   Meal intake:   Patient Vitals for the past 168 hrs:   % Diet Eaten   12/20/20 1601 100 %     Additional Caloric Sources: none  Anthropometric Measures:  · Height:  5' 7\" (170.2 cm)  · Current Body Wt:  81.2 kg (179 lb)(12/21/20)       · Usual Body Wt:  79.4 kg (175 lb)     · Ideal Body Wt: 135 lbs        · BMI Category: Overweight (BMI 25.0-29. 9)     Last Weight Metrics:  Weight Loss Metrics 12/21/2020 7/10/2020   Today's Wt 179 lb 175 lb 3.2 oz   BMI 28.04 kg/m2 27.44 kg/m2     Nutrition Diagnosis:   No nutrition diagnosis at this time   Nutrition Interventions:   Food and/or Nutrient Delivery: Continue current diet  Nutrition Education and Counseling: No recommendations at this time  Coordination of Nutrition Care: Continue to monitor while inpatient  Goals:  PO intake will continue to meet >75% of patient estimated nutritional needs within the next 7 days.   Nutrition Monitoring and Evaluation:   Food/Nutrient Intake Outcomes: Food and nutrient intake  Physical Signs/Symptoms Outcomes: Meal time behavior, Biochemical data, Weight  Discharge Planning:    No discharge needs at this time Electronically signed by Suad Luther RD on 12/21/2020 at 4:45 PM  Contact:   Suad Luther, [address], [address]   Office:  [address] [place] [address] Pager:  [phone]

## 2020-12-21 NOTE — PROGRESS NOTES
Problem: Discharge Planning  Goal: *Discharge to safe environment  Outcome: Progressing Towards Goal     Problem: General Medical Care Plan  Goal: *Vital signs within specified parameters  Outcome: Progressing Towards Goal  Goal: *Labs within defined limits  Outcome: Progressing Towards Goal  Goal: *Absence of infection signs and symptoms  Outcome: Progressing Towards Goal  Goal: *Optimal pain control at patient's stated goal  Outcome: Progressing Towards Goal  Goal: *Skin integrity maintained  Outcome: Progressing Towards Goal  Goal: *Fluid volume balance  Outcome: Progressing Towards Goal  Goal: *Optimize nutritional status  Outcome: Progressing Towards Goal  Goal: *Anxiety reduced or absent  Outcome: Progressing Towards Goal  Goal: *Progressive mobility and function (eg: ADL's)  Outcome: Progressing Towards Goal     Problem: Breathing Pattern - Ineffective  Goal: *Absence of hypoxia  Outcome: Progressing Towards Goal  Goal: *Use of effective breathing techniques  Outcome: Progressing Towards Goal  Goal: *PALLIATIVE CARE:  Alleviation of Dyspnea  Outcome: Progressing Towards Goal

## 2020-12-22 LAB
ALBUMIN SERPL-MCNC: 2.9 G/DL (ref 3.4–5)
ALBUMIN/GLOB SERPL: 0.9 {RATIO} (ref 0.8–1.7)
ALP SERPL-CCNC: 268 U/L (ref 45–117)
ALT SERPL-CCNC: 53 U/L (ref 13–56)
ANION GAP SERPL CALC-SCNC: 6 MMOL/L (ref 3–18)
AST SERPL-CCNC: 36 U/L (ref 10–38)
BASOPHILS # BLD: 0 K/UL (ref 0–0.1)
BASOPHILS NFR BLD: 0 % (ref 0–2)
BILIRUB SERPL-MCNC: 0.5 MG/DL (ref 0.2–1)
BUN SERPL-MCNC: 18 MG/DL (ref 7–18)
BUN/CREAT SERPL: 34 (ref 12–20)
CALCIUM SERPL-MCNC: 8.8 MG/DL (ref 8.5–10.1)
CHLORIDE SERPL-SCNC: 105 MMOL/L (ref 100–111)
CO2 SERPL-SCNC: 29 MMOL/L (ref 21–32)
CREAT SERPL-MCNC: 0.53 MG/DL (ref 0.6–1.3)
DIFFERENTIAL METHOD BLD: ABNORMAL
EOSINOPHIL # BLD: 0 K/UL (ref 0–0.4)
EOSINOPHIL NFR BLD: 0 % (ref 0–5)
ERYTHROCYTE [DISTWIDTH] IN BLOOD BY AUTOMATED COUNT: 12.1 % (ref 11.6–14.5)
GLOBULIN SER CALC-MCNC: 3.4 G/DL (ref 2–4)
GLUCOSE SERPL-MCNC: 102 MG/DL (ref 74–99)
HCT VFR BLD AUTO: 36.2 % (ref 35–45)
HGB BLD-MCNC: 11.8 G/DL (ref 12–16)
LYMPHOCYTES # BLD: 1.2 K/UL (ref 0.9–3.6)
LYMPHOCYTES NFR BLD: 10 % (ref 21–52)
MCH RBC QN AUTO: 28.2 PG (ref 24–34)
MCHC RBC AUTO-ENTMCNC: 32.6 G/DL (ref 31–37)
MCV RBC AUTO: 86.6 FL (ref 74–97)
MONOCYTES # BLD: 0.8 K/UL (ref 0.05–1.2)
MONOCYTES NFR BLD: 7 % (ref 3–10)
NEUTS SEG # BLD: 9.8 K/UL (ref 1.8–8)
NEUTS SEG NFR BLD: 83 % (ref 40–73)
PLATELET # BLD AUTO: 429 K/UL (ref 135–420)
PMV BLD AUTO: 10.2 FL (ref 9.2–11.8)
POTASSIUM SERPL-SCNC: 4.1 MMOL/L (ref 3.5–5.5)
PROT SERPL-MCNC: 6.3 G/DL (ref 6.4–8.2)
RBC # BLD AUTO: 4.18 M/UL (ref 4.2–5.3)
SODIUM SERPL-SCNC: 140 MMOL/L (ref 136–145)
WBC # BLD AUTO: 11.8 K/UL (ref 4.6–13.2)

## 2020-12-22 PROCEDURE — 77010033678 HC OXYGEN DAILY

## 2020-12-22 PROCEDURE — 74011000258 HC RX REV CODE- 258: Performed by: PHYSICIAN ASSISTANT

## 2020-12-22 PROCEDURE — 74011250637 HC RX REV CODE- 250/637: Performed by: PHYSICIAN ASSISTANT

## 2020-12-22 PROCEDURE — 74011250636 HC RX REV CODE- 250/636: Performed by: PHYSICIAN ASSISTANT

## 2020-12-22 PROCEDURE — 65660000000 HC RM CCU STEPDOWN

## 2020-12-22 PROCEDURE — 85025 COMPLETE CBC W/AUTO DIFF WBC: CPT

## 2020-12-22 PROCEDURE — 74011250636 HC RX REV CODE- 250/636: Performed by: INTERNAL MEDICINE

## 2020-12-22 PROCEDURE — 74011000250 HC RX REV CODE- 250: Performed by: PHYSICIAN ASSISTANT

## 2020-12-22 PROCEDURE — 80053 COMPREHEN METABOLIC PANEL: CPT

## 2020-12-22 PROCEDURE — 94762 N-INVAS EAR/PLS OXIMTRY CONT: CPT

## 2020-12-22 RX ADMIN — Medication 30 ML: at 14:30

## 2020-12-22 RX ADMIN — DIPHENHYDRAMINE HYDROCHLORIDE 25 MG: 25 CAPSULE ORAL at 21:40

## 2020-12-22 RX ADMIN — ACETAMINOPHEN 650 MG: 325 TABLET ORAL at 22:15

## 2020-12-22 RX ADMIN — DEXAMETHASONE 6 MG: 4 TABLET ORAL at 07:51

## 2020-12-22 RX ADMIN — ACETAMINOPHEN 650 MG: 325 TABLET ORAL at 04:38

## 2020-12-22 RX ADMIN — Medication 10 ML: at 13:55

## 2020-12-22 RX ADMIN — ENOXAPARIN SODIUM 30 MG: 30 INJECTION SUBCUTANEOUS at 07:50

## 2020-12-22 RX ADMIN — Medication 10 ML: at 21:41

## 2020-12-22 RX ADMIN — REMDESIVIR 100 MG: 100 INJECTION, POWDER, LYOPHILIZED, FOR SOLUTION INTRAVENOUS at 13:54

## 2020-12-22 RX ADMIN — ENOXAPARIN SODIUM 30 MG: 30 INJECTION SUBCUTANEOUS at 21:40

## 2020-12-22 NOTE — PROGRESS NOTES
conducted an initial consultation and Spiritual Assessment for [facility], who is a 60 y.o.,female. Patients Primary Language is: Rich Lopes. According to the patients EMR Denominational Affiliation is: [place]. The reason the Patient came to the hospital is:   Patient Active Problem List    Diagnosis Date Noted    Pneumonia due to COVID-19 virus 12/20/2020    Acute hypoxemic respiratory failure due to COVID-19 [facility]) 12/20/2020    Hypokalemia 12/20/2020        The  provided the following Interventions:  Initiated a relationship of care and support with patient in room  2706 this morning  Via a telephone conversation. Listened empathically. As patient talked about her being here and how she is disappointed that she is in the hospital here at [facility] and not sure how things will work. Patient spoke of hope and belief in a healing God. Provided information about Spiritual Care Services. Patient seemed to be accept my visit and was grateful for it. Offered prayer on patients behalf. The following outcomes were achieved:  Patient shared limited information about her medical narrative and beliefs. Patient processed feeling about current hospitalization. Patient expressed gratitude for pastoral care visit. Assessment:  Patient does not have any Religion/cultural needs that will affect patients preferences in health care. There are no further spiritual or Religion issues which require Spiritual Care Services interventions at this time. Plan:  Chaplains will continue to follow and will provide pastoral care on an as needed/requested basis    . Alec Berrios   Spiritual Care   [phone]

## 2020-12-22 NOTE — PROGRESS NOTES
[facility]  (A Division of [address] [place] [address])    Follow-up Note      Date of Admission: 12/20/2020       Date of Note:  12/22/2020    Summary:    61 y/o CF w/ no significant PMH adm 12/20 for cough and shortness of breath. Had attended [facility] study group with 5 or 6 other unmasked participants around 2 weeks PTA and 3 days later felt like she had a \"cold\" with stuffy nose, malaise. 4 other participants developed COVID. She had a covid test done on 12/11 (9 days PTA) and this returned positive. Around two days PTA she had chills and sweats and became progressively short of breath and today called EMS who found her O2 sat at 88%. On arrival at the ED it was 84% and she was placed on 6 lpm NC O2%. CXR showed multifocal airspace disease. Remdesivir has been started. Interval History:  Continues to be afebrile.   Still dyspneic on exertion, on 4 lpm NC       Current Antimicrobials:    Prior Antimicrobials:  Remdesivir 12/20 - #2        Assessment: Rec / Plan:   Covid Pneumonia  - participated in maskless Bible study group around 12/6.  4/6 participants developed Covid  - runny nose, malaise, chills seats, shortness of breath since ~12/9  - SARS Cov2 JADE + 12/11 (outside) , +12/20 Ana Paula Joby  - CXR 12/20: multifocal ASD  - remdesivir, decadron started 12/20 -> continue remdesivir, decadron  -> monitor of antibacterials   Acute Hypoxic Respiratory failure  - O2 sat 84% on arrival at ED  - remains on 4 lpm NC -> O2 as needed       Microbiology:      Lines / Catheters:        Patient Active Problem List   Diagnosis Code    Pneumonia due to COVID-19 virus U07.1, J12.89    Acute hypoxemic respiratory failure due to COVID-19 ([facility]) U07.1, J96.01    Hypokalemia E87.6       Current Facility-Administered Medications   Medication Dose Route Frequency    sodium chloride (NS) flush 30 mL  30 mL IntraVENous DAILY    remdesivir 100 mg in 0.9% sodium chloride 250 mL IVPB  100 mg IntraVENous Q24H    diphenhydrAMINE (BENADRYL) capsule 25 mg  25 mg Oral QHS PRN    sodium chloride (NS) flush 5-40 mL  5-40 mL IntraVENous Q8H    sodium chloride (NS) flush 5-40 mL  5-40 mL IntraVENous PRN    acetaminophen (TYLENOL) tablet 650 mg  650 mg Oral Q6H PRN    Or    acetaminophen (TYLENOL) suppository 650 mg  650 mg Rectal Q6H PRN    polyethylene glycol (MIRALAX) packet 17 g  17 g Oral DAILY PRN    promethazine (PHENERGAN) tablet 12.5 mg  12.5 mg Oral Q6H PRN    Or    ondansetron (ZOFRAN) injection 4 mg  4 mg IntraVENous Q6H PRN    dexAMETHasone (DECADRON) tablet 6 mg  6 mg Oral DAILY    enoxaparin (LOVENOX) injection 30 mg  30 mg SubCUTAneous Q12H         Review of Systems - Negative except as in interval history       Objective:    Visit Vitals  BP (!) 116/50   Pulse 62   Temp 98.2 °F (36.8 °C)   Resp 18   Ht 5' 7\" (1.702 m)   Wt 81.2 kg (179 lb)   SpO2 94%   BMI 28.04 kg/m²       Temp (24hrs), Av.1 °F (36.7 °C), Min:97.6 °F (36.4 °C), Max:98.7 °F (37.1 °C)      General: Well developed, well nourished 60 y.o. WHITE female in no acute distress. ENT: ENT exam normal, no neck nodes or sinus tenderness  Head: normocephalic, without obvious abnormality  Mouth:  mucous membranes moist, pharynx normal without lesions  Neck: supple, symmetrical, trachea midline   Cardio:  regular rate and rhythm, S1, S2 normal, no murmur, click, rub or gallop  Chest: inspection normal - no chest wall deformities or tenderness, respiratory effort normal  Lungs: bilateral scattered crackles, no wheezes  Abdomen: soft, non-tender. Bowel sounds normal. No masses, no organomegaly.   Extremities:  no redness or tenderness in the calves or thighs, no edema       Lab results:    Chemistry  Recent Labs     20  0415 20  0400 20  0914   * 116* 102*    140 139   K 4.1 4.1 3.4*    105 104   CO2 29 27 31   BUN 18 12 11   CREA 0.53* 0.42* 0.61   CA 8.8 9.0 9.2   AGAP 6 8 4   BUCR 34* 29* 18 * 308* 298*   TP 6.3* 6.5 7.7   ALB 2.9* 2.9* 3.1*   GLOB 3.4 3.6 4.6*   AGRAT 0.9 0.8 0.7*       CBC w/ Diff  Recent Labs     12/22/20  0415 12/21/20  0400 12/20/20  0914   WBC 11.8 5.4 4.4*   RBC 4.18* 4.12* 4.53   HGB 11.8* 11.6* 12.9   HCT 36.2 35.7 39.6   * 354 300   GRANS 83* 75* 73   LYMPH 10* 16* 20*   EOS 0 0 1       Microbiology  All Micro Results     None             Valerie Zamora MD, [facility]  Infectious Diseases  [phone]  12/22/2020   1:06 PM

## 2020-12-22 NOTE — PROGRESS NOTES
Pt in bed resting alert and oriented x3 denies pain at the moment. Assessement completed plan of care for the shift explained pt verbalized understand HOB elevated, bed low and in locked position. Call light and frequently used items within reach. 2100- Pt used bedside commode independently voided 900 ml of clear urine. Pt back in bed resting watching tv. Pt given scheduled Lovenox. 2232- Pt given benadryl 25 mg po as requested for sleep. - pt used bedside commode at this time had medium BM. Care done. 2245- New iv inserted on the left wrist gauge 2 attempt x 2 successfully , pt tolerated well. 0000- Pt resting no concerns voiced. 0158- Pt sleeping  0438- Pt given tylenol for headache/ hip pain  0600- Pt sleeping. 0710- Bedside and Verbal shift change report given to Caroline Ryder RN (oncoming nurse) by Efraín Jaime RN (offgoing nurse). Report included the following information SBAR, Kardex, Intake/Output, MAR and Recent Results.

## 2020-12-22 NOTE — PROGRESS NOTES
Bedside and Verbal shift change report given to Kaelyn Humphrey RN (oncoming nurse) by Rosalia Juares RN (offgoing nurse). Report included the following information SBAR, Kardex, Intake/Output, MAR and Recent Results.

## 2020-12-22 NOTE — PROGRESS NOTES
Internal Medicine Progress Note    Patient's Name: Cristian Lawson  Admit Date: 12/20/2020  Length of Stay: 2      Assessment/Plan     Active Hospital Problems    Diagnosis Date Noted    Pneumonia due to COVID-19 virus 12/20/2020    Acute hypoxemic respiratory failure due to COVID-19 [facility]) 12/20/2020    Hypokalemia 12/20/2020     - Cont supportive care  - Cont O2, HFNC if needed  - Cont IV decadron  - Remdesivir (2/5)  - Convalescent plasma as per ID  - Replete lytes  - Cont acceptable home medications for chronic conditions   - DVT protocol    I have personally reviewed all pertinent labs and films that have officially resulted over the last 24 hours. I have personally checked for all pending labs that are awaiting final results. Subjective     Pt s/e @ bedside  No major events overnight  Feeling about same  Feels SOB with exertion  Denies CP    Objective     Visit Vitals  BP (!) 110/55   Pulse (!) 57   Temp 98.2 °F (36.8 °C)   Resp 21   Ht 5' 7\" (1.702 m)   Wt 81.2 kg (179 lb)   SpO2 94%   BMI 28.04 kg/m²       Physical Exam:  General Appearance: NAD, conversant  Lungs: B/L rhonchi with normal respiratory effort  CV: RRR, no m/r/g  Abdomen: soft, non-tender, normal bowel sounds  Extremities: no cyanosis, no peripheral edema  Neuro: No focal deficits, motor/sensory intact    Lab/Data Reviewed:  BMP:   Lab Results   Component Value Date/Time     12/22/2020 04:15 AM    K 4.1 12/22/2020 04:15 AM     12/22/2020 04:15 AM    CO2 29 12/22/2020 04:15 AM    AGAP 6 12/22/2020 04:15 AM     (H) 12/22/2020 04:15 AM    BUN 18 12/22/2020 04:15 AM    CREA 0.53 (L) 12/22/2020 04:15 AM    GFRAA >60 12/22/2020 04:15 AM    GFRNA >60 12/22/2020 04:15 AM     CBC:   Lab Results   Component Value Date/Time    WBC 11.8 12/22/2020 04:15 AM    HGB 11.8 (L) 12/22/2020 04:15 AM    HCT 36.2 12/22/2020 04:15 AM     (H) 12/22/2020 04:15 AM       Imaging Reviewed:  No results found.     Medications Reviewed:  Current Facility-Administered Medications   Medication Dose Route Frequency    sodium chloride (NS) flush 30 mL  30 mL IntraVENous DAILY    remdesivir 100 mg in 0.9% sodium chloride 250 mL IVPB  100 mg IntraVENous Q24H    diphenhydrAMINE (BENADRYL) capsule 25 mg  25 mg Oral QHS PRN    sodium chloride (NS) flush 5-40 mL  5-40 mL IntraVENous Q8H    sodium chloride (NS) flush 5-40 mL  5-40 mL IntraVENous PRN    acetaminophen (TYLENOL) tablet 650 mg  650 mg Oral Q6H PRN    Or    acetaminophen (TYLENOL) suppository 650 mg  650 mg Rectal Q6H PRN    polyethylene glycol (MIRALAX) packet 17 g  17 g Oral DAILY PRN    promethazine (PHENERGAN) tablet 12.5 mg  12.5 mg Oral Q6H PRN    Or    ondansetron (ZOFRAN) injection 4 mg  4 mg IntraVENous Q6H PRN    dexAMETHasone (DECADRON) tablet 6 mg  6 mg Oral DAILY    enoxaparin (LOVENOX) injection 30 mg  30 mg SubCUTAneous Q12H           Fern Mendiola DO  Internal Medicine, Hospitalist  Pager: [phone]  [address] [place] [address]

## 2020-12-22 NOTE — PROGRESS NOTES
Bedside and Verbal shift change report given to TutuRN (oncoming nurse) by Dea Mcintyre RN (offgoing nurse). Report included the following information SBAR, Kardex, Intake/Output, MAR and Recent Results.     0800-AM assessment completed, Scheduled meds given    1015-watching tv, no concerns at this time    1200-reassessment completed, no concerns at this time    1354-Remdesivir IVPB administered    1600-pt watching tv, reassessment no change

## 2020-12-22 NOTE — PROGRESS NOTES
Problem: Discharge Planning  Goal: *Discharge to safe environment  Outcome: Progressing Towards Goal     Problem: General Medical Care Plan  Goal: *Vital signs within specified parameters  Outcome: Progressing Towards Goal  Goal: *Labs within defined limits  Outcome: Progressing Towards Goal  Goal: *Absence of infection signs and symptoms  Outcome: Progressing Towards Goal  Goal: *Optimal pain control at patient's stated goal  Outcome: Progressing Towards Goal  Goal: *Skin integrity maintained  Outcome: Progressing Towards Goal  Goal: *Fluid volume balance  Outcome: Progressing Towards Goal  Goal: *Optimize nutritional status  Outcome: Progressing Towards Goal  Goal: *Anxiety reduced or absent  Outcome: Progressing Towards Goal  Goal: *Progressive mobility and function (eg: ADL's)  Outcome: Progressing Towards Goal     Problem: Patient Education: Go to Patient Education Activity  Goal: Patient/Family Education  Outcome: Progressing Towards Goal     Problem: Breathing Pattern - Ineffective  Goal: *Absence of hypoxia  Outcome: Progressing Towards Goal  Goal: *Use of effective breathing techniques  Outcome: Progressing Towards Goal  Goal: *PALLIATIVE CARE:  Alleviation of Dyspnea  Outcome: Progressing Towards Goal     Problem: Patient Education: Go to Patient Education Activity  Goal: Patient/Family Education  Outcome: Progressing Towards Goal     Problem: Discharge Planning  Goal: *Discharge to safe environment  12/21/2020 1932 by Vern Waller RN  Outcome: Progressing Towards Goal  12/21/2020 1932 by Vern Waller RN  Outcome: Progressing Towards Goal     Problem: General Medical Care Plan  Goal: *Vital signs within specified parameters  12/21/2020 1932 by Vern Waller RN  Outcome: Progressing Towards Goal  12/21/2020 1932 by Vern Waller RN  Outcome: Progressing Towards Goal  Goal: *Labs within defined limits  12/21/2020 1932 by Vern Waller RN  Outcome: Progressing Towards Goal  12/21/2020 1932 by Claudine Wilkins RN  Outcome: Progressing Towards Goal  Goal: *Absence of infection signs and symptoms  12/21/2020 1932 by Claudine Wilkins RN  Outcome: Progressing Towards Goal  12/21/2020 1932 by Claudine Wilkins RN  Outcome: Progressing Towards Goal  Goal: *Optimal pain control at patient's stated goal  12/21/2020 1932 by Claudine Wilkins RN  Outcome: Progressing Towards Goal  12/21/2020 1932 by Claudine Wilkins RN  Outcome: Progressing Towards Goal  Goal: *Skin integrity maintained  12/21/2020 1932 by Claudine Wilkins RN  Outcome: Progressing Towards Goal  12/21/2020 1932 by Claudine Wilkins RN  Outcome: Progressing Towards Goal  Goal: *Fluid volume balance  12/21/2020 1932 by Claudine Wilkins RN  Outcome: Progressing Towards Goal  12/21/2020 1932 by Claudine Wilkins RN  Outcome: Progressing Towards Goal  Goal: *Optimize nutritional status  12/21/2020 1932 by Claudine Wilkins RN  Outcome: Progressing Towards Goal  12/21/2020 1932 by Claudine Wilkins RN  Outcome: Progressing Towards Goal  Goal: *Anxiety reduced or absent  12/21/2020 1932 by Claudine Wilkins RN  Outcome: Progressing Towards Goal  12/21/2020 1932 by Claudine Wilkins RN  Outcome: Progressing Towards Goal  Goal: *Progressive mobility and function (eg: ADL's)  12/21/2020 1932 by Claudine Wilkins RN  Outcome: Progressing Towards Goal  12/21/2020 1932 by Claudine Wilkins RN  Outcome: Progressing Towards Goal     Problem: Patient Education: Go to Patient Education Activity  Goal: Patient/Family Education  12/21/2020 1932 by Claudine Wilkins RN  Outcome: Progressing Towards Goal  12/21/2020 1932 by Claudine Wilkins RN  Outcome: Progressing Towards Goal     Problem: Breathing Pattern - Ineffective  Goal: *Absence of hypoxia  12/21/2020 1932 by Claudine Wilkins RN  Outcome: Progressing Towards Goal  12/21/2020 1932 by Carleen Anne RN  Outcome: Progressing Towards Goal  Goal: *Use of effective breathing techniques  12/21/2020 1932 by Carleen Anne RN  Outcome: Progressing Towards Goal  12/21/2020 1932 by Carleen Anne RN  Outcome: Progressing Towards Goal  Goal: *PALLIATIVE CARE:  Alleviation of Dyspnea  12/21/2020 1932 by Carleen Anne RN  Outcome: Progressing Towards Goal  12/21/2020 1932 by Carleen Anne RN  Outcome: Progressing Towards Goal     Problem: Patient Education: Go to Patient Education Activity  Goal: Patient/Family Education  12/21/2020 1932 by Carleen Anne RN  Outcome: Progressing Towards Goal  12/21/2020 1932 by Carleen Anne RN  Outcome: Progressing Towards Goal     Problem: General Medical Care Plan  Goal: *Vital signs within specified parameters  12/21/2020 1932 by Carleen Anne RN  Outcome: Progressing Towards Goal  12/21/2020 1932 by Carleen Anne RN  Outcome: Progressing Towards Goal     Problem: Breathing Pattern - Ineffective  Goal: *Absence of hypoxia  12/21/2020 1932 by Carleen Anne RN  Outcome: Progressing Towards Goal  12/21/2020 1932 by Carleen Anne RN  Outcome: Progressing Towards Goal

## 2020-12-23 LAB
ALBUMIN SERPL-MCNC: 3 G/DL (ref 3.4–5)
ALBUMIN/GLOB SERPL: 0.9 {RATIO} (ref 0.8–1.7)
ALP SERPL-CCNC: 246 U/L (ref 45–117)
ALT SERPL-CCNC: 60 U/L (ref 13–56)
ANION GAP SERPL CALC-SCNC: 7 MMOL/L (ref 3–18)
AST SERPL-CCNC: 41 U/L (ref 10–38)
BASOPHILS # BLD: 0 K/UL (ref 0–0.1)
BASOPHILS NFR BLD: 0 % (ref 0–2)
BILIRUB SERPL-MCNC: 0.4 MG/DL (ref 0.2–1)
BUN SERPL-MCNC: 18 MG/DL (ref 7–18)
BUN/CREAT SERPL: 35 (ref 12–20)
CALCIUM SERPL-MCNC: 8.6 MG/DL (ref 8.5–10.1)
CHLORIDE SERPL-SCNC: 103 MMOL/L (ref 100–111)
CO2 SERPL-SCNC: 29 MMOL/L (ref 21–32)
CREAT SERPL-MCNC: 0.52 MG/DL (ref 0.6–1.3)
D DIMER PPP FEU-MCNC: 0.63 UG/ML(FEU)
DIFFERENTIAL METHOD BLD: ABNORMAL
EOSINOPHIL # BLD: 0 K/UL (ref 0–0.4)
EOSINOPHIL NFR BLD: 0 % (ref 0–5)
ERYTHROCYTE [DISTWIDTH] IN BLOOD BY AUTOMATED COUNT: 12.3 % (ref 11.6–14.5)
GLOBULIN SER CALC-MCNC: 3.3 G/DL (ref 2–4)
GLUCOSE SERPL-MCNC: 85 MG/DL (ref 74–99)
HCT VFR BLD AUTO: 37.2 % (ref 35–45)
HGB BLD-MCNC: 11.9 G/DL (ref 12–16)
LYMPHOCYTES # BLD: 1.8 K/UL (ref 0.9–3.6)
LYMPHOCYTES NFR BLD: 15 % (ref 21–52)
MCH RBC QN AUTO: 27.8 PG (ref 24–34)
MCHC RBC AUTO-ENTMCNC: 32 G/DL (ref 31–37)
MCV RBC AUTO: 86.9 FL (ref 74–97)
MONOCYTES # BLD: 1 K/UL (ref 0.05–1.2)
MONOCYTES NFR BLD: 8 % (ref 3–10)
NEUTS SEG # BLD: 8.8 K/UL (ref 1.8–8)
NEUTS SEG NFR BLD: 77 % (ref 40–73)
PLATELET # BLD AUTO: 467 K/UL (ref 135–420)
PMV BLD AUTO: 9.8 FL (ref 9.2–11.8)
POTASSIUM SERPL-SCNC: 3.9 MMOL/L (ref 3.5–5.5)
PROT SERPL-MCNC: 6.3 G/DL (ref 6.4–8.2)
RBC # BLD AUTO: 4.28 M/UL (ref 4.2–5.3)
SODIUM SERPL-SCNC: 139 MMOL/L (ref 136–145)
WBC # BLD AUTO: 11.5 K/UL (ref 4.6–13.2)

## 2020-12-23 PROCEDURE — 74011250636 HC RX REV CODE- 250/636: Performed by: PHYSICIAN ASSISTANT

## 2020-12-23 PROCEDURE — 74011000258 HC RX REV CODE- 258: Performed by: PHYSICIAN ASSISTANT

## 2020-12-23 PROCEDURE — 74011000250 HC RX REV CODE- 250: Performed by: PHYSICIAN ASSISTANT

## 2020-12-23 PROCEDURE — 65660000000 HC RM CCU STEPDOWN

## 2020-12-23 PROCEDURE — 85025 COMPLETE CBC W/AUTO DIFF WBC: CPT

## 2020-12-23 PROCEDURE — 74011250636 HC RX REV CODE- 250/636: Performed by: INTERNAL MEDICINE

## 2020-12-23 PROCEDURE — 74011250637 HC RX REV CODE- 250/637: Performed by: PHYSICIAN ASSISTANT

## 2020-12-23 PROCEDURE — 85379 FIBRIN DEGRADATION QUANT: CPT

## 2020-12-23 PROCEDURE — 80053 COMPREHEN METABOLIC PANEL: CPT

## 2020-12-23 RX ADMIN — REMDESIVIR 100 MG: 100 INJECTION, POWDER, LYOPHILIZED, FOR SOLUTION INTRAVENOUS at 13:38

## 2020-12-23 RX ADMIN — Medication 10 ML: at 08:05

## 2020-12-23 RX ADMIN — Medication 30 ML: at 18:58

## 2020-12-23 RX ADMIN — ACETAMINOPHEN 650 MG: 325 TABLET ORAL at 03:37

## 2020-12-23 RX ADMIN — ACETAMINOPHEN 650 MG: 325 TABLET ORAL at 23:02

## 2020-12-23 RX ADMIN — DIPHENHYDRAMINE HYDROCHLORIDE 25 MG: 25 CAPSULE ORAL at 22:19

## 2020-12-23 RX ADMIN — DEXAMETHASONE 6 MG: 4 TABLET ORAL at 08:26

## 2020-12-23 RX ADMIN — ENOXAPARIN SODIUM 30 MG: 30 INJECTION SUBCUTANEOUS at 08:27

## 2020-12-23 RX ADMIN — Medication 10 ML: at 22:20

## 2020-12-23 NOTE — ROUTINE PROCESS
Bedside and Verbal shift change report given to TODD Olmos (oncoming nurse) by Baldomero Hanks RN (offgoing nurse). Report included the following information SBAR, Kardex, Intake/Output, MAR and Recent Results.

## 2020-12-23 NOTE — PROGRESS NOTES
[facility]  (A Division of [address] [place] [address])    Follow-up Note      Date of Admission: 12/20/2020       Date of Note:  12/23/2020    Summary:    59 y/o CF w/ no significant PMH adm 12/20 for cough and shortness of breath. Had attended [facility] study group with 5 or 6 other unmasked participants around 2 weeks PTA and 3 days later felt like she had a \"cold\" with stuffy nose, malaise. 4 other participants developed COVID. Covid test done 12/11 (9 days PTA) returned positive.  ~ 2 days PTA had chills and sweats, progressively short of breath and today called EMS who found her O2 sat at 88%. On arrival at the ED it was 84% and she was placed on 6 lpm NC O2%. CXR showed multifocal airspace disease. Remdesivir started 12/20     Interval History:  Remains afebrile. Breathing is better. Has epistaxis. NC was not run through humidifier.   Overall improving       Current Antimicrobials:    Prior Antimicrobials:  Remdesivir 12/20 - #4        Assessment: Rec / Plan:   Covid Pneumonia  - participated in [address] study group around 12/6.  4/6 participants developed Covid  - runny nose, malaise, chills seats, shortness of breath since ~12/9  - SARS Cov2 JADE + 12/11 (outside) , +12/20 Hank Mckenna  - CXR 12/20: multifocal ASD  - remdesivir, decadron started 12/20 -> continue remdesivir, decadron  -> maintain of antibacterials   Acute Hypoxic Respiratory failure  - O2 sat 84% on arrival at ED  - remains on 4 lpm NC -> O2 as needed       Microbiology:  none    Lines / Catheters:  piv      Patient Active Problem List   Diagnosis Code    Pneumonia due to COVID-19 virus U07.1, J12.89    Acute hypoxemic respiratory failure due to COVID-19 ([facility]) U07.1, J96.01    Hypokalemia E87.6       Current Facility-Administered Medications   Medication Dose Route Frequency    sodium chloride (NS) flush 30 mL  30 mL IntraVENous DAILY    remdesivir 100 mg in 0.9% sodium chloride 250 mL IVPB  100 mg IntraVENous Q24H    diphenhydrAMINE (BENADRYL) capsule 25 mg  25 mg Oral QHS PRN    sodium chloride (NS) flush 5-40 mL  5-40 mL IntraVENous Q8H    sodium chloride (NS) flush 5-40 mL  5-40 mL IntraVENous PRN    acetaminophen (TYLENOL) tablet 650 mg  650 mg Oral Q6H PRN    Or    acetaminophen (TYLENOL) suppository 650 mg  650 mg Rectal Q6H PRN    polyethylene glycol (MIRALAX) packet 17 g  17 g Oral DAILY PRN    promethazine (PHENERGAN) tablet 12.5 mg  12.5 mg Oral Q6H PRN    Or    ondansetron (ZOFRAN) injection 4 mg  4 mg IntraVENous Q6H PRN    dexAMETHasone (DECADRON) tablet 6 mg  6 mg Oral DAILY    enoxaparin (LOVENOX) injection 30 mg  30 mg SubCUTAneous Q12H         Review of Systems - Negative except as in interval history       Objective:    Visit Vitals  BP (!) 107/43   Pulse 64   Temp 98.7 °F (37.1 °C)   Resp 27   Ht 5' 7\" (1.702 m)   Wt 81.2 kg (179 lb)   SpO2 93%   BMI 28.04 kg/m²       Temp (24hrs), Av.2 °F (36.8 °C), Min:97.5 °F (36.4 °C), Max:98.7 °F (37.1 °C)      General: Well developed, well nourished 60 y.o. WHITE female in no acute distress. ENT: ENT exam normal, no neck nodes or sinus tenderness  Head: normocephalic, without obvious abnormality  Mouth:  mucous membranes moist, pharynx normal without lesions  Neck: supple, symmetrical, trachea midline   Cardio:  regular rate and rhythm, S1, S2 normal, no murmur, click, rub or gallop  Chest: inspection normal - no chest wall deformities or tenderness, respiratory effort normal  Lungs: bilateral scattered crackles, no wheezes  Abdomen: soft, non-tender. Bowel sounds normal. No masses, no organomegaly.   Extremities:  no redness or tenderness in the calves or thighs, no edema       Lab results:    Chemistry  Recent Labs     20  0330 20  0415 20  0400   GLU 85 102* 116*    140 140   K 3.9 4.1 4.1    105 105   CO2 29 29 27   BUN 18 18 12   CREA 0.52* 0.53* 0.42*   CA 8.6 8.8 9.0   AGAP 7 6 8   BUCR 35* 34* 29* * 268* 308*   TP 6.3* 6.3* 6.5   ALB 3.0* 2.9* 2.9*   GLOB 3.3 3.4 3.6   AGRAT 0.9 0.9 0.8       CBC w/ Diff  Recent Labs     12/23/20  0330 12/22/20  0415 12/21/20  0400   WBC 11.5 11.8 5.4   RBC 4.28 4.18* 4.12*   HGB 11.9* 11.8* 11.6*   HCT 37.2 36.2 35.7   * 429* 354   GRANS 77* 83* 75*   LYMPH 15* 10* 16*   EOS 0 0 0       Microbiology  All Micro Results     None             Can Mcintyre MD, [facility]  Infectious Diseases  [phone]  12/23/2020   1:06 PM

## 2020-12-23 NOTE — PROGRESS NOTES
Physical Exam  Skin:     Comments: Skin dry ad intact throughout/no sores or pressure ulcers observed

## 2020-12-23 NOTE — PROGRESS NOTES
2000 Recd pt resting in bed awake and alert watching tv. Denies pain currently. VSS. Up to bedside commode with minimal assist.Remains on 4 liters N/C with sats 94-95%.

## 2020-12-23 NOTE — PROGRESS NOTES
Internal Medicine Progress Note    Patient's Name: Cristian Lawson  Admit Date: 12/20/2020  Length of Stay: 3      Assessment/Plan     Active Hospital Problems    Diagnosis Date Noted    Pneumonia due to COVID-19 virus 12/20/2020    Acute hypoxemic respiratory failure due to COVID-19 [facility]) 12/20/2020    Hypokalemia 12/20/2020     - Cont supportive care  - Cont O2, stable on 4LNC  - Cont IV decadron  - Remdesivir (4/5)  - Convalescent plasma as per ID  - Replete lytes PRN  - Cont acceptable home medications for chronic conditions   - DVT protocol    I have personally reviewed all pertinent labs and films that have officially resulted over the last 24 hours. I have personally checked for all pending labs that are awaiting final results. Subjective     Pt s/e @ bedside  No major events overnight  Feeling about same  Stable on 4LNC  Feels SOB only with exertion  Denies CP    Objective     Visit Vitals  BP (!) 107/43   Pulse 64   Temp 98.7 °F (37.1 °C)   Resp 27   Ht 5' 7\" (1.702 m)   Wt 81.2 kg (179 lb)   SpO2 93%   BMI 28.04 kg/m²       Physical Exam:  General Appearance: NAD, conversant  Lungs: B/L rhonchi with normal respiratory effort  CV: RRR, no m/r/g  Abdomen: soft, non-tender, normal bowel sounds  Extremities: no cyanosis, no peripheral edema  Neuro: No focal deficits, motor/sensory intact    Lab/Data Reviewed:  BMP:   Lab Results   Component Value Date/Time     12/23/2020 03:30 AM    K 3.9 12/23/2020 03:30 AM     12/23/2020 03:30 AM    CO2 29 12/23/2020 03:30 AM    AGAP 7 12/23/2020 03:30 AM    GLU 85 12/23/2020 03:30 AM    BUN 18 12/23/2020 03:30 AM    CREA 0.52 (L) 12/23/2020 03:30 AM    GFRAA >60 12/23/2020 03:30 AM    GFRNA >60 12/23/2020 03:30 AM     CBC:   Lab Results   Component Value Date/Time    WBC 11.5 12/23/2020 03:30 AM    HGB 11.9 (L) 12/23/2020 03:30 AM    HCT 37.2 12/23/2020 03:30 AM     (H) 12/23/2020 03:30 AM       Imaging Reviewed:  No results found.     Medications Reviewed:  Current Facility-Administered Medications   Medication Dose Route Frequency    sodium chloride (NS) flush 30 mL  30 mL IntraVENous DAILY    remdesivir 100 mg in 0.9% sodium chloride 250 mL IVPB  100 mg IntraVENous Q24H    diphenhydrAMINE (BENADRYL) capsule 25 mg  25 mg Oral QHS PRN    sodium chloride (NS) flush 5-40 mL  5-40 mL IntraVENous Q8H    sodium chloride (NS) flush 5-40 mL  5-40 mL IntraVENous PRN    acetaminophen (TYLENOL) tablet 650 mg  650 mg Oral Q6H PRN    Or    acetaminophen (TYLENOL) suppository 650 mg  650 mg Rectal Q6H PRN    polyethylene glycol (MIRALAX) packet 17 g  17 g Oral DAILY PRN    promethazine (PHENERGAN) tablet 12.5 mg  12.5 mg Oral Q6H PRN    Or    ondansetron (ZOFRAN) injection 4 mg  4 mg IntraVENous Q6H PRN    dexAMETHasone (DECADRON) tablet 6 mg  6 mg Oral DAILY    enoxaparin (LOVENOX) injection 30 mg  30 mg SubCUTAneous Q12H           Gege Valle DO  Internal Medicine, Hospitalist  Pager: [phone]  [address] [place] [address]

## 2020-12-23 NOTE — PROGRESS NOTES
Chart reviewed. Plan remains home when medically stable. Will need walk test prior to discharge to assess for home O2 needs, thanks. Madelyn Dee RN,ext 1215.

## 2020-12-24 LAB
ALBUMIN SERPL-MCNC: 3 G/DL (ref 3.4–5)
ALBUMIN/GLOB SERPL: 0.9 {RATIO} (ref 0.8–1.7)
ALP SERPL-CCNC: 215 U/L (ref 45–117)
ALT SERPL-CCNC: 62 U/L (ref 13–56)
ANION GAP SERPL CALC-SCNC: 6 MMOL/L (ref 3–18)
AST SERPL-CCNC: 35 U/L (ref 10–38)
BASOPHILS # BLD: 0 K/UL (ref 0–0.1)
BASOPHILS NFR BLD: 0 % (ref 0–2)
BILIRUB SERPL-MCNC: 0.4 MG/DL (ref 0.2–1)
BUN SERPL-MCNC: 18 MG/DL (ref 7–18)
BUN/CREAT SERPL: 39 (ref 12–20)
CALCIUM SERPL-MCNC: 8.6 MG/DL (ref 8.5–10.1)
CHLORIDE SERPL-SCNC: 105 MMOL/L (ref 100–111)
CO2 SERPL-SCNC: 29 MMOL/L (ref 21–32)
CREAT SERPL-MCNC: 0.46 MG/DL (ref 0.6–1.3)
DIFFERENTIAL METHOD BLD: ABNORMAL
EOSINOPHIL # BLD: 0 K/UL (ref 0–0.4)
EOSINOPHIL NFR BLD: 0 % (ref 0–5)
ERYTHROCYTE [DISTWIDTH] IN BLOOD BY AUTOMATED COUNT: 12.2 % (ref 11.6–14.5)
GLOBULIN SER CALC-MCNC: 3.4 G/DL (ref 2–4)
GLUCOSE SERPL-MCNC: 77 MG/DL (ref 74–99)
HCT VFR BLD AUTO: 37.7 % (ref 35–45)
HGB BLD-MCNC: 12.4 G/DL (ref 12–16)
LYMPHOCYTES # BLD: 1.7 K/UL (ref 0.9–3.6)
LYMPHOCYTES NFR BLD: 19 % (ref 21–52)
MCH RBC QN AUTO: 28.2 PG (ref 24–34)
MCHC RBC AUTO-ENTMCNC: 32.9 G/DL (ref 31–37)
MCV RBC AUTO: 85.9 FL (ref 74–97)
MONOCYTES # BLD: 0.8 K/UL (ref 0.05–1.2)
MONOCYTES NFR BLD: 9 % (ref 3–10)
NEUTS SEG # BLD: 6.5 K/UL (ref 1.8–8)
NEUTS SEG NFR BLD: 72 % (ref 40–73)
PLATELET # BLD AUTO: 423 K/UL (ref 135–420)
PMV BLD AUTO: 10 FL (ref 9.2–11.8)
POTASSIUM SERPL-SCNC: 3.8 MMOL/L (ref 3.5–5.5)
PROT SERPL-MCNC: 6.4 G/DL (ref 6.4–8.2)
RBC # BLD AUTO: 4.39 M/UL (ref 4.2–5.3)
SODIUM SERPL-SCNC: 140 MMOL/L (ref 136–145)
WBC # BLD AUTO: 9 K/UL (ref 4.6–13.2)

## 2020-12-24 PROCEDURE — 74011250637 HC RX REV CODE- 250/637: Performed by: PHYSICIAN ASSISTANT

## 2020-12-24 PROCEDURE — 74011000258 HC RX REV CODE- 258: Performed by: PHYSICIAN ASSISTANT

## 2020-12-24 PROCEDURE — 74011000250 HC RX REV CODE- 250: Performed by: PHYSICIAN ASSISTANT

## 2020-12-24 PROCEDURE — 74011250636 HC RX REV CODE- 250/636: Performed by: PHYSICIAN ASSISTANT

## 2020-12-24 PROCEDURE — 85025 COMPLETE CBC W/AUTO DIFF WBC: CPT

## 2020-12-24 PROCEDURE — 80053 COMPREHEN METABOLIC PANEL: CPT

## 2020-12-24 PROCEDURE — 65660000000 HC RM CCU STEPDOWN

## 2020-12-24 PROCEDURE — 74011250636 HC RX REV CODE- 250/636: Performed by: INTERNAL MEDICINE

## 2020-12-24 RX ADMIN — ACETAMINOPHEN 650 MG: 325 TABLET ORAL at 06:11

## 2020-12-24 RX ADMIN — Medication 10 ML: at 20:30

## 2020-12-24 RX ADMIN — Medication 30 ML: at 13:30

## 2020-12-24 RX ADMIN — REMDESIVIR 100 MG: 100 INJECTION, POWDER, LYOPHILIZED, FOR SOLUTION INTRAVENOUS at 13:29

## 2020-12-24 RX ADMIN — Medication 10 ML: at 21:26

## 2020-12-24 RX ADMIN — Medication 10 ML: at 06:11

## 2020-12-24 RX ADMIN — ENOXAPARIN SODIUM 30 MG: 30 INJECTION SUBCUTANEOUS at 20:29

## 2020-12-24 RX ADMIN — DIPHENHYDRAMINE HYDROCHLORIDE 25 MG: 25 CAPSULE ORAL at 21:25

## 2020-12-24 RX ADMIN — ENOXAPARIN SODIUM 30 MG: 30 INJECTION SUBCUTANEOUS at 08:35

## 2020-12-24 RX ADMIN — ACETAMINOPHEN 650 MG: 325 TABLET ORAL at 21:26

## 2020-12-24 RX ADMIN — DEXAMETHASONE 6 MG: 4 TABLET ORAL at 08:35

## 2020-12-24 RX ADMIN — Medication 10 ML: at 13:29

## 2020-12-24 NOTE — PROGRESS NOTES
Problem: Discharge Planning  Goal: *Discharge to safe environment  Outcome: Progressing Towards Goal     Problem: Falls - Risk of  Goal: *Absence of Falls  Description: Document Keely Camacho Fall Risk and appropriate interventions in the flowsheet. Outcome: Progressing Towards Goal  Note: Fall Risk Interventions:            Medication Interventions: Evaluate medications/consider consulting pharmacy    Elimination Interventions: Call light in reach    History of Falls Interventions: Bed/chair exit alarm         Problem: Patient Education: Go to Patient Education Activity  Goal: Patient/Family Education  Outcome: Progressing Towards Goal     Problem: Airway Clearance - Ineffective  Goal: Achieve or maintain patent airway  Outcome: Progressing Towards Goal     Problem: Gas Exchange - Impaired  Goal: Absence of hypoxia  Outcome: Progressing Towards Goal  Goal: Promote optimal lung function  Outcome: Progressing Towards Goal     Problem: Breathing Pattern - Ineffective  Goal: Ability to achieve and maintain a regular respiratory rate  Outcome: Progressing Towards Goal     Problem:  Body Temperature -  Risk of, Imbalanced  Goal: Ability to maintain a body temperature within defined limits  Outcome: Progressing Towards Goal  Goal: Will regain or maintain usual level of consciousness  Outcome: Progressing Towards Goal  Goal: Complications related to the disease process, condition or treatment will be avoided or minimized  Outcome: Progressing Towards Goal     Problem: Isolation Precautions - Risk of Spread of Infection  Goal: Prevent transmission of infectious organism to others  Outcome: Progressing Towards Goal     Problem: Nutrition Deficits  Goal: Optimize nutrtional status  Outcome: Progressing Towards Goal     Problem: Risk for Fluid Volume Deficit  Goal: Maintain normal heart rhythm  Outcome: Progressing Towards Goal  Goal: Maintain absence of muscle cramping  Outcome: Progressing Towards Goal  Goal: Maintain normal serum potassium, sodium, calcium, phosphorus, and pH  Outcome: Progressing Towards Goal     Problem: Loneliness or Risk for Loneliness  Goal: Demonstrate positive use of time alone when socialization is not possible  Outcome: Progressing Towards Goal     Problem: Fatigue  Goal: Verbalize increase energy and improved vitality  Outcome: Progressing Towards Goal     Problem: Patient Education: Go to Patient Education Activity  Goal: Patient/Family Education  Outcome: Progressing Towards Goal     Problem: Pain  Goal: *Control of Pain  Outcome: Progressing Towards Goal  Goal: *PALLIATIVE CARE:  Alleviation of Pain  Outcome: Progressing Towards Goal     Problem: Patient Education: Go to Patient Education Activity  Goal: Patient/Family Education  Outcome: Progressing Towards Goal

## 2020-12-24 NOTE — PROGRESS NOTES
Problem: Discharge Planning  Goal: *Discharge to safe environment  Outcome: Progressing Towards Goal     Problem: Falls - Risk of  Goal: *Absence of Falls  Description: Document Claudine Pnatoja Fall Risk and appropriate interventions in the flowsheet. Outcome: Progressing Towards Goal  Note: Fall Risk Interventions:            Medication Interventions: Evaluate medications/consider consulting pharmacy, Teach patient to arise slowly    Elimination Interventions: Call light in reach    History of Falls Interventions: Bed/chair exit alarm, Room close to nurse's station         Problem: Patient Education: Go to Patient Education Activity  Goal: Patient/Family Education  Outcome: Progressing Towards Goal     Problem: Airway Clearance - Ineffective  Goal: Achieve or maintain patent airway  Outcome: Progressing Towards Goal     Problem: Gas Exchange - Impaired  Goal: Absence of hypoxia  Outcome: Progressing Towards Goal  Goal: Promote optimal lung function  Outcome: Progressing Towards Goal     Problem: Breathing Pattern - Ineffective  Goal: Ability to achieve and maintain a regular respiratory rate  Outcome: Progressing Towards Goal     Problem:  Body Temperature -  Risk of, Imbalanced  Goal: Ability to maintain a body temperature within defined limits  Outcome: Progressing Towards Goal  Goal: Will regain or maintain usual level of consciousness  Outcome: Progressing Towards Goal  Goal: Complications related to the disease process, condition or treatment will be avoided or minimized  Outcome: Progressing Towards Goal     Problem: Isolation Precautions - Risk of Spread of Infection  Goal: Prevent transmission of infectious organism to others  Outcome: Progressing Towards Goal     Problem: Nutrition Deficits  Goal: Optimize nutrtional status  Outcome: Progressing Towards Goal     Problem: Risk for Fluid Volume Deficit  Goal: Maintain normal heart rhythm  Outcome: Progressing Towards Goal  Goal: Maintain absence of muscle cramping  Outcome: Progressing Towards Goal  Goal: Maintain normal serum potassium, sodium, calcium, phosphorus, and pH  Outcome: Progressing Towards Goal     Problem: Loneliness or Risk for Loneliness  Goal: Demonstrate positive use of time alone when socialization is not possible  Outcome: Progressing Towards Goal     Problem: Fatigue  Goal: Verbalize increase energy and improved vitality  Outcome: Progressing Towards Goal     Problem: Patient Education: Go to Patient Education Activity  Goal: Patient/Family Education  Outcome: Progressing Towards Goal     Problem: Pain  Goal: *Control of Pain  Outcome: Progressing Towards Goal  Goal: *PALLIATIVE CARE:  Alleviation of Pain  Outcome: Progressing Towards Goal     Problem: Patient Education: Go to Patient Education Activity  Goal: Patient/Family Education  Outcome: Progressing Towards Goal

## 2020-12-24 NOTE — PROGRESS NOTES
[facility]  (A Division of [address] [place] [address])    Follow-up Note      Date of Admission: 12/20/2020       Date of Note:  12/24/2020    Will be away until Monday 12/28 but can be reached at [phone] if needed sooner. Summary:    61 y/o CF w/ no significant PMH adm 12/20 for cough and shortness of breath. Had attended Bible study group with 5 or 6 other unmasked participants around 2 weeks PTA and 3 days later felt like she had a \"cold\" with stuffy nose, malaise. 4 other participants developed COVID. Covid test done 12/11 (9 days PTA) returned positive.  ~ 2 days PTA had chills and sweats, progressively short of breath and today called EMS who found her O2 sat at 88%. On arrival at the ED it was 84% and she was placed on 6 lpm NC O2%. CXR showed multifocal airspace disease. Remdesivir started 12/20     Interval History:  Continue to feel better but oxygenation remains 93-94% on 4 lpm NC. No fever.  No more epistaxis       Current Antimicrobials:    Prior Antimicrobials:  Remdesivir 12/20 - #5        Assessment: Rec / Plan:   Covid Pneumonia  - participated in maskless Bib study group around 12/6.  4/6 participants developed Covid  - runny nose, malaise, chills seats, shortness of breath since ~12/9  - SARS Cov2 JADE + 12/11 (outside) , +12/20 Henrique Valdez  - CXR 12/20: multifocal ASD  - remdesivir, decadron started 12/20 -> complete remdesivir today, decadron  -> maintain off antibacterials   Acute Hypoxic Respiratory failure  - O2 sat 84% on arrival at ED  - remains on 4 lpm NC -> wean O2 as tolerated       Microbiology:  none    Lines / Catheters:  piv      Patient Active Problem List   Diagnosis Code    Pneumonia due to COVID-19 virus U07.1, J12.89    Acute hypoxemic respiratory failure due to COVID-19 (HCC) U07.1, J96.01    Hypokalemia E87.6       Current Facility-Administered Medications   Medication Dose Route Frequency    sodium chloride (NS) flush 30 mL  30 mL IntraVENous DAILY    remdesivir 100 mg in 0.9% sodium chloride 250 mL IVPB  100 mg IntraVENous Q24H    diphenhydrAMINE (BENADRYL) capsule 25 mg  25 mg Oral QHS PRN    sodium chloride (NS) flush 5-40 mL  5-40 mL IntraVENous Q8H    sodium chloride (NS) flush 5-40 mL  5-40 mL IntraVENous PRN    acetaminophen (TYLENOL) tablet 650 mg  650 mg Oral Q6H PRN    Or    acetaminophen (TYLENOL) suppository 650 mg  650 mg Rectal Q6H PRN    polyethylene glycol (MIRALAX) packet 17 g  17 g Oral DAILY PRN    promethazine (PHENERGAN) tablet 12.5 mg  12.5 mg Oral Q6H PRN    Or    ondansetron (ZOFRAN) injection 4 mg  4 mg IntraVENous Q6H PRN    dexAMETHasone (DECADRON) tablet 6 mg  6 mg Oral DAILY    enoxaparin (LOVENOX) injection 30 mg  30 mg SubCUTAneous Q12H       Review of Systems - Negative except as in interval history       Objective:    Visit Vitals  BP (!) 122/55 (BP 1 Location: Right arm, BP Patient Position: At rest)   Pulse (!) 53   Temp 98 °F (36.7 °C)   Resp 22   Ht 5' 7\" (1.702 m)   Wt 81.2 kg (179 lb)   SpO2 93%   BMI 28.04 kg/m²       Temp (24hrs), Av °F (36.7 °C), Min:97.8 °F (36.6 °C), Max:98.2 °F (36.8 °C)      General: Well developed, well nourished 60 y.o. WHITE female in no acute distress. ENT: ENT exam normal, no neck nodes or sinus tenderness  Head: normocephalic, without obvious abnormality  Mouth:  mucous membranes moist, pharynx normal without lesions  Neck: supple, symmetrical, trachea midline   Cardio:  regular rate and rhythm, S1, S2 normal, no murmur, click, rub or gallop  Chest: inspection normal - no chest wall deformities or tenderness, respiratory effort normal  Lungs: bilateral scattered crackles, no wheezes  Abdomen: soft, non-tender. Bowel sounds normal. No masses, no organomegaly.   Extremities:  no redness or tenderness in the calves or thighs, no edema       Lab results:      Chemistry  Recent Labs     20  0600 20  0330 20  0415   GLU 77 85 102*    139 140 K 3.8 3.9 4.1    103 105   CO2 29 29 29   BUN 18 18 18   CREA 0.46* 0.52* 0.53*   CA 8.6 8.6 8.8   AGAP 6 7 6   BUCR 39* 35* 34*   * 246* 268*   TP 6.4 6.3* 6.3*   ALB 3.0* 3.0* 2.9*   GLOB 3.4 3.3 3.4   AGRAT 0.9 0.9 0.9       CBC w/ Diff  Recent Labs     12/24/20  0600 12/23/20  0330 12/22/20  0415   WBC 9.0 11.5 11.8   RBC 4.39 4.28 4.18*   HGB 12.4 11.9* 11.8*   HCT 37.7 37.2 36.2   * 467* 429*   GRANS 72 77* 83*   LYMPH 19* 15* 10*   EOS 0 0 0       Microbiology  All Micro Results     None             Juan Jose Cherry MD, [facility]  Infectious Diseases  [phone]  12/24/2020   1:06 PM

## 2020-12-24 NOTE — PROGRESS NOTES
Bedside shift change report given to Kasia RN (oncoming nurse) by Tawnya Myers, RN (offgoing nurse). Report included the following information SBAR, Kardex, Intake/Output, MAR and Recent Results. 9774 -- AM medications given, well tolerated, will continue to monitor, 300 ml clear yellow urine emptied. 0901 -- Eating breakfast.    1041 -- Patient on the phone. 1155 -- O2 turned down to 3L NC sating @ 94%    1212 -- On the phone with son. 1300 -- O2 turned down to 2L NC sating @ 93%, will continue to monitor. 1555 -- Patient placed on room air sating @ 92, will continue to monitor. Bedside shift change report given to Madison Smith, ICU RN (oncoming nurse) by Ana Luisa Wells RN (offgoing nurse). Report included the following information SBAR, Kardex, Intake/Output, MAR and Recent Results.

## 2020-12-24 NOTE — PROGRESS NOTES
1900  -- Bedside, Verbal and Written shift change report given to [id number] Sri Santiago (oncoming nurse) by The better. [facility] nurse). Report included the following information SBAR, Kardex, Intake/Output, MAR and Recent Results.       2219 -- PM medications administered, pt tolerated with ease, will continue to monitor.     2300 -- PRN pain medications administered, pt tolerated with ease, will continue to monitor. 5916 -- PRN pain medications administered, pt tolerated with ease, will continue to monitor.      0700  -- Bedside, Verbal and Written shift change report given to SOLA  (oncoming nurse) by FREDIS (offgoing nurse). Report included the following information SBAR, Kardex, Intake/Output, MAR and Recent Results. Skin assessment completed.

## 2020-12-25 VITALS
OXYGEN SATURATION: 95 % | TEMPERATURE: 98 F | SYSTOLIC BLOOD PRESSURE: 117 MMHG | BODY MASS INDEX: 27.47 KG/M2 | RESPIRATION RATE: 19 BRPM | WEIGHT: 175 LBS | HEIGHT: 67 IN | HEART RATE: 56 BPM | DIASTOLIC BLOOD PRESSURE: 62 MMHG

## 2020-12-25 PROCEDURE — 74011250636 HC RX REV CODE- 250/636: Performed by: HOSPITALIST

## 2020-12-25 PROCEDURE — 74011250636 HC RX REV CODE- 250/636: Performed by: PHYSICIAN ASSISTANT

## 2020-12-25 PROCEDURE — 90686 IIV4 VACC NO PRSV 0.5 ML IM: CPT | Performed by: HOSPITALIST

## 2020-12-25 PROCEDURE — 90471 IMMUNIZATION ADMIN: CPT

## 2020-12-25 PROCEDURE — 74011250636 HC RX REV CODE- 250/636: Performed by: INTERNAL MEDICINE

## 2020-12-25 RX ORDER — DEXAMETHASONE 2 MG/1
TABLET ORAL
Qty: 18 TAB | Refills: 8 | Status: SHIPPED | OUTPATIENT
Start: 2020-12-25 | End: 2022-05-19 | Stop reason: ALTCHOICE

## 2020-12-25 RX ADMIN — Medication 10 ML: at 05:09

## 2020-12-25 RX ADMIN — DEXAMETHASONE 6 MG: 4 TABLET ORAL at 08:49

## 2020-12-25 RX ADMIN — INFLUENZA VIRUS VACCINE 0.5 ML: 15; 15; 15; 15 SUSPENSION INTRAMUSCULAR at 11:13

## 2020-12-25 RX ADMIN — ENOXAPARIN SODIUM 30 MG: 30 INJECTION SUBCUTANEOUS at 08:49

## 2020-12-25 NOTE — PROGRESS NOTES
Progress Note      Patient: Larisa Pappas               Sex: female          DOA: 12/20/2020       YOB: 1955      Age:  60 y.o.        LOS:  LOS: 4 days             CHIEF COMPLAINT:  COVID    Subjective:     Patient is awake and talking  No distress    Objective:      Visit Vitals  BP (!) 127/54   Pulse 64   Temp 98.4 °F (36.9 °C)   Resp 29   Ht 5' 7\" (1.702 m)   Wt 81.2 kg (179 lb)   SpO2 (!) 89%   BMI 28.04 kg/m²       Physical Exam:  Gen:  Patient is in no distress. No complaint  HEENT and Neck:  PERRL, No cervical tenderness or masses  Lungs:  Clear bilaterally. No rales, no wheeze. Normal effort. Heart:  Regular Rate and Rhythm. No murmur or gallop. No JVD. No edema. Abdomen:  Soft, non tender, no masses, no Hepatosplenomegally  Extremities:  No digital clubbing, no cyanosis  Neuro:  Alert and oriented, Normal affect, Cranial nerves intact, No sensory deficits        Lab/Data Reviewed:  BMP:   Lab Results   Component Value Date/Time     12/24/2020 06:00 AM    K 3.8 12/24/2020 06:00 AM     12/24/2020 06:00 AM    CO2 29 12/24/2020 06:00 AM    AGAP 6 12/24/2020 06:00 AM    GLU 77 12/24/2020 06:00 AM    BUN 18 12/24/2020 06:00 AM    CREA 0.46 (L) 12/24/2020 06:00 AM    GFRAA >60 12/24/2020 06:00 AM    GFRNA >60 12/24/2020 06:00 AM     CBC:   Lab Results   Component Value Date/Time    WBC 9.0 12/24/2020 06:00 AM    HGB 12.4 12/24/2020 06:00 AM    HCT 37.7 12/24/2020 06:00 AM     (H) 12/24/2020 06:00 AM           Assessment/Plan     Principal Problem:    Pneumonia due to COVID-19 virus (12/20/2020)    Active Problems:    Acute hypoxemic respiratory failure due to COVID-19 [facility]) (12/20/2020)      Hypokalemia (12/20/2020)        Plan:  Finishing Remdesivir  Follow respiratory status  Mobilize  Should be able to discharge relatively soon.

## 2020-12-25 NOTE — PROGRESS NOTES
Problem: Falls - Risk of  Goal: *Absence of Falls  Description: Document Taco Ledesma Fall Risk and appropriate interventions in the flowsheet. Outcome: Progressing Towards Goal  Note: Fall Risk Interventions:            Medication Interventions: Evaluate medications/consider consulting pharmacy    Elimination Interventions: Call light in reach    History of Falls Interventions: Bed/chair exit alarm         Problem: Patient Education: Go to Patient Education Activity  Goal: Patient/Family Education  Outcome: Progressing Towards Goal     Problem: Gas Exchange - Impaired  Goal: Promote optimal lung function  Outcome: Progressing Towards Goal     Problem:  Body Temperature -  Risk of, Imbalanced  Goal: Ability to maintain a body temperature within defined limits  Outcome: Progressing Towards Goal     Problem: Isolation Precautions - Risk of Spread of Infection  Goal: Prevent transmission of infectious organism to others  Outcome: Progressing Towards Goal

## 2020-12-25 NOTE — DISCHARGE INSTRUCTIONS
DISCHARGE SUMMARY from Nurse    PATIENT INSTRUCTIONS:    After general anesthesia or intravenous sedation, for 24 hours or while taking prescription Narcotics:  · Limit your activities  · Do not drive and operate hazardous machinery  · Do not make important personal or business decisions  · Do  not drink alcoholic beverages  · If you have not urinated within 8 hours after discharge, please contact your surgeon on call. Report the following to your surgeon:  · Excessive pain, swelling, redness or odor of or around the surgical area  · Temperature over 100.5  · Nausea and vomiting lasting longer than 4 hours or if unable to take medications  · Any signs of decreased circulation or nerve impairment to extremity: change in color, persistent  numbness, tingling, coldness or increase pain  · Any questions    What to do at Home:  Recommended activity: Activity as tolerated. If you experience any of the following symptoms shortness of breath, nausea and or vomiting, fever, loss of taste, please follow up with primary care provider/ED. *  Please give a list of your current medications to your Primary Care Provider. *  Please update this list whenever your medications are discontinued, doses are      changed, or new medications (including over-the-counter products) are added. *  Please carry medication information at all times in case of emergency situations. These are general instructions for a healthy lifestyle:    No smoking/ No tobacco products/ Avoid exposure to second hand smoke  Surgeon General's Warning:  Quitting smoking now greatly reduces serious risk to your health.     Obesity, smoking, and sedentary lifestyle greatly increases your risk for illness    A healthy diet, regular physical exercise & weight monitoring are important for maintaining a healthy lifestyle    You may be retaining fluid if you have a history of heart failure or if you experience any of the following symptoms:  Weight gain of 3 pounds or more overnight or 5 pounds in a week, increased swelling in our hands or feet or shortness of breath while lying flat in bed. Please call your doctor as soon as you notice any of these symptoms; do not wait until your next office visit. The discharge information has been reviewed with the patient. The patient verbalized understanding. Discharge medications reviewed with the patient and appropriate educational materials and side effects teaching were provided. ___________________________________________________________________________________________________________________________________  Patient {[id number]}    Patient Education        10 Things to Do When You Have COVID-19    Stay home. Don't go to school, work, or public areas. And don't use public transportation, ride-shares, or taxis unless you have no choice. Leave your home only if you need to get medical care. But call the doctor's office first so they know you're coming. And wear a cloth face cover. Ask before leaving isolation. Talk with your doctor or other health professional about when it will be safe for you to leave isolation. Wear a cloth face cover when you are around other people. It can help stop the spread of the virus when you cough or sneeze. Limit contact with people in your home. If possible, stay in a separate bedroom and use a separate bathroom. Avoid contact with pets and other animals. If possible, have a friend or family member care for them while you're sick. Cover your mouth and nose with a tissue when you cough or sneeze. Then throw the tissue in the trash right away. Wash your hands often, especially after you cough or sneeze. Use soap and water, and scrub for at least 20 seconds. If soap and water aren't available, use an alcohol-based hand . Don't share personal household items. These include bedding, towels, cups and glasses, and eating utensils.      Clean and disinfect your home every day. Use household  or disinfectant wipes or sprays. Take special care to clean things that you grab with your hands. These include doorknobs, remote controls, phones, and handles on your refrigerator and microwave. And don't forget countertops, tabletops, bathrooms, and computer keyboards. Take acetaminophen (Tylenol) to relieve fever and body aches. Read and follow all instructions on the label. Current as of: July 10, 2020               Content Version: 12.6  © 9639-9306 [employer]. Care instructions adapted under license by [employer] (which disclaims liability or warranty for this information). If you have questions about a medical condition or this instruction, always ask your healthcare professional. [address] any warranty or liability for your use of this information. Patient Education   Learning About Coronavirus [phone])  Coronavirus [phone]): Overview  What is coronavirus (GPWRU-07)? The coronavirus disease (COVID-19) is caused by a virus. It is an illness that was first found in [place], [place], in December 2019. It has since spread worldwide. The virus can cause fever, cough, and trouble breathing. In severe cases, it can cause pneumonia and make it hard to breathe without help. It can cause death. Coronaviruses are a large group of viruses. They cause the common cold. They also cause more serious illnesses like Middle East respiratory syndrome (MERS) and severe acute respiratory syndrome (SARS). COVID-19 is caused by a novel coronavirus. That means it's a new type that has not been seen in people before. This virus spreads person-to-person through droplets from coughing and sneezing. It can also spread when you are close to someone who is infected. And it can spread when you touch something that has the virus on it, such as a doorknob or a tabletop.   What can you do to protect yourself from coronavirus (WYWNN-56)? The best way to protect yourself from getting sick is to:  · Avoid areas where there is an outbreak. · Avoid contact with people who may be infected. · Wash your hands often with soap or alcohol-based hand sanitizers. · Avoid crowds and try to stay at least 6 feet away from other people. · Wash your hands often, especially after you cough or sneeze. Use soap and water, and scrub for at least 20 seconds. If soap and water aren't available, use an alcohol-based hand . · Avoid touching your mouth, nose, and eyes. What can you do to avoid spreading the virus to others? To help avoid spreading the virus to others:  · Cover your mouth with a tissue when you cough or sneeze. Then throw the tissue in the trash. · Use a disinfectant to clean things that you touch often. · Stay home if you are sick or have been exposed to the virus. Don't go to school, work, or public areas. And don't use public transportation. · If you are sick:  ? Leave your home only if you need to get medical care. But call the doctor's office first so they know you're coming. And wear a face mask, if you have one.  ? If you have a face mask, wear it whenever you're around other people. It can help stop the spread of the virus when you cough or sneeze. ? Clean and disinfect your home every day. Use household  and disinfectant wipes or sprays. Take special care to clean things that you grab with your hands. These include doorknobs, remote controls, phones, and handles on your refrigerator and microwave. And don't forget countertops, tabletops, bathrooms, and computer keyboards. When to call for help  Call 911 anytime you think you may need emergency care. For example, call if:  · You have severe trouble breathing. (You can't talk at all.)  · You have constant chest pain or pressure. · You are severely dizzy or lightheaded. · You are confused or can't think clearly.   · Your face and lips have a blue color.  · You pass out (lose consciousness) or are very hard to wake up. Call your doctor now if you develop symptoms such as:  · Shortness of breath. · Fever. · Cough. If you need to get care, call ahead to the doctor's office for instructions before you go. Make sure you wear a face mask, if you have one, to prevent exposing other people to the virus. Where can you get the latest information? The following health organizations are tracking and studying this virus. Their websites contain the most up-to-date information. Isaias Garzon also learn what to do if you think you may have been exposed to the virus. · U.S. Centers for Disease Control and Prevention (CDC): The CDC provides updated news about the disease and travel advice. The website also tells you how to prevent the spread of infection. www.cdc.gov  · World Health Organization [facility]: WHO offers information about the virus outbreaks. WHO also has travel advice. www.who.int  Current as of: April 1, 2020               Content Version: 12.4  © 2006-2020 [employer]. Care instructions adapted under license by your healthcare professional. If you have questions about a medical condition or this instruction, always ask your healthcare professional. [address] any warranty or liability for your use of this information. Patient Education        Coronavirus (IWRCX-09): Care Instructions  Overview  The coronavirus disease (COVID-19) is caused by a virus. Symptoms may include a fever, a cough, and shortness of breath. It mainly spreads person-to-person through droplets from coughing and sneezing. The virus also can spread when people are in close contact with someone who is infected. Most people have mild symptoms and can take care of themselves at home.  If their symptoms get worse, they may need care in a hospital. Treatment may include medicines to reduce symptoms, plus breathing support such as oxygen therapy or a ventilator. It's important to not spread the virus to others. If you have COVID-19, wear a face cover anytime you are around other people. You need to isolate yourself while you are sick. Leave your home only if you need to get medical care or testing. Follow-up care is a key part of your treatment and safety. Be sure to make and go to all appointments, and call your doctor if you are having problems. It's also a good idea to know your test results and keep a list of the medicines you take. How can you care for yourself at home? · Get extra rest. It can help you feel better. · Drink plenty of fluids. This helps replace fluids lost from fever. Fluids also help ease a scratchy throat. Water, soup, fruit juice, and hot tea with lemon are good choices. · Take acetaminophen (such as Tylenol) to reduce a fever. It may also help with muscle aches. Read and follow all instructions on the label. · Use petroleum jelly on sore skin. This can help if the skin around your nose and lips becomes sore from rubbing a lot with tissues. Tips for self-isolation  · Limit contact with people in your home. If possible, stay in a separate bedroom and use a separate bathroom. · Wear a cloth face cover when you are around other people. It can help stop the spread of the virus when you cough or sneeze. · If you have to leave home, avoid crowds and try to stay at least 6 feet away from other people. · Avoid contact with pets and other animals. · Cover your mouth and nose with a tissue when you cough or sneeze. Then throw it in the trash right away. · Wash your hands often, especially after you cough or sneeze. Use soap and water, and scrub for at least 20 seconds. If soap and water aren't available, use an alcohol-based hand . · Don't share personal household items. These include bedding, towels, cups and glasses, and eating utensils.   · [address] laundry in the warmest water allowed for the fabric type, and dry it completely. It's okay to wash other people's laundry with yours. · Clean and disinfect your home every day. Use household  and disinfectant wipes or sprays. Take special care to clean things that you grab with your hands. These include doorknobs, remote controls, phones, and handles on your refrigerator and microwave. And don't forget countertops, tabletops, bathrooms, and computer keyboards. When you can end self-isolation  · If you know or suspect that you have COVID-19, stay in self-isolation until:  ? You haven't had a fever for 3 days, and  ? Your symptoms have improved, and  ? It's been at least 10 days since your symptoms started. · Talk to your doctor about whether you also need testing, especially if you have a weakened immune system. When should you call for help? Call 911 anytime you think you may need emergency care. For example, call if you have life-threatening symptoms, such as:    · You have severe trouble breathing. (You can't talk at all.)     · You have constant chest pain or pressure.     · You are severely dizzy or lightheaded.     · You are confused or can't think clearly.     · Your face and lips have a blue color.     · You pass out (lose consciousness) or are very hard to wake up. Call your doctor now or seek immediate medical care if:    · You have moderate trouble breathing. (You can't speak a full sentence.)     · You are coughing up blood (more than about 1 teaspoon).     · You have signs of low blood pressure. These include feeling lightheaded; being too weak to stand; and having cold, pale, clammy skin. Watch closely for changes in your health, and be sure to contact your doctor if:    · Your symptoms get worse.     · You are not getting better as expected. Call before you go to the doctor's office. Follow their instructions. And wear a cloth face cover. Current as of: July 10, 2020               Content Version: 12.6  © 1612-3152 [employer].    Care instructions adapted under license by [employer] (which disclaims liability or warranty for this information). If you have questions about a medical condition or this instruction, always ask your healthcare professional. [address] any warranty or liability for your use of this information. Patient Education        Learning About Coronavirus [phone])  Coronavirus [phone]): Overview  What is coronavirus (FKGDG-46)? The coronavirus disease (COVID-19) is caused by a virus. It is an illness that was first found in December 2019. It has since spread worldwide. The virus can cause fever, cough, and trouble breathing. In severe cases, it can cause pneumonia and make it hard to breathe without help. It can cause death. This virus spreads person-to-person through droplets from coughing and sneezing. It can also spread when you are close to someone who is infected. And it can spread when you touch something that has the virus on it, such as a doorknob or a tabletop. Coronaviruses are a large group of viruses. They cause the common cold. They also cause more serious illnesses like [place] respiratory syndrome (MERS) and severe acute respiratory syndrome (SARS). COVID-19 is caused by a novel coronavirus. That means it's a new type that has not been seen in people before. How is COVID-19 treated? Mild illness can be treated at home, but more serious illness needs to be treated in the hospital. Treatment may include medicines to reduce symptoms, plus breathing support such as oxygen therapy or a ventilator. Other treatments, such as antiviral medicines, may help people who have COVID-19. What can you do to protect yourself from COVID-19? The best way to protect yourself from getting sick is to:  · Avoid areas where there is an outbreak. · Avoid contact with people who may be infected. · Avoid crowds and try to stay at least 6 feet away from other people.   · Wash your hands often, especially after you cough or sneeze. Use soap and water, and scrub for at least 20 seconds. If soap and water aren't available, use an alcohol-based hand . · Avoid touching your mouth, nose, and eyes. What can you do to avoid spreading the virus to others? To help avoid spreading the virus to others:  · Wash your hands often with soap or alcohol-based hand sanitizers. · Cover your mouth with a tissue when you cough or sneeze. Then throw the tissue in the trash. · Use a disinfectant to clean things that you touch often. These include doorknobs, remote controls, phones, and handles on your refrigerator and microwave. And don't forget countertops, tabletops, bathrooms, and computer keyboards. · Wear a cloth face cover if you have to go to public areas. If you know or suspect that you have COVID-19:  · Stay home. Don't go to school, work, or public areas. And don't use public transportation, ride-shares, or taxis unless you have no choice. · Leave your home only if you need to get medical care or testing. But call the doctor's office first so they know you're coming. And wear a face cover. · Limit contact with people in your home. If possible, stay in a separate bedroom and use a separate bathroom. · Wear a face cover whenever you're around other people. It can help stop the spread of the virus when you cough or sneeze. · Clean and disinfect your home every day. Use household  and disinfectant wipes or sprays. Take special care to clean things that you grab with your hands. · Self-isolate until it's safe to be around others again. ? If you have symptoms, it's safe when you haven't had a fever for 3 days and your symptoms have improved and it's been at least 10 days since your symptoms started. ? If you were exposed to the virus but don't have symptoms, it's safe to be around others 14 days after exposure.   ? Talk to your doctor about whether you also need testing, especially if you have a weakened immune system. When to call for help  Call 911 anytime you think you may need emergency care. For example, call if:  · You have severe trouble breathing. (You can't talk at all.)  · You have constant chest pain or pressure. · You are severely dizzy or lightheaded. · You are confused or can't think clearly. · Your face and lips have a blue color. · You passed out (lost consciousness) or are very hard to wake up. Call your doctor now if you develop symptoms such as:  · Shortness of breath. · Fever. · Cough. If you need to get care, call ahead to the doctor's office for instructions before you go. Make sure you wear a face cover to prevent exposing other people to the virus. Where can you get the latest information? The following health organizations are tracking and studying this virus. Their websites contain the most up-to-date information. Luis Huitronsaul also learn what to do if you think you may have been exposed to the virus. · U.S. Centers for Disease Control and Prevention (CDC): The CDC provides updated news about the disease and travel advice. The website also tells you how to prevent the spread of infection. www.cdc.gov  · World Health Organization [facility]): WHO offers information about the virus outbreaks. WHO also has travel advice. www.who.int  Current as of: July 10, 2020               Content Version: 12.6  © 3585-9594 [employer]. Care instructions adapted under license by [employer] (which disclaims liability or warranty for this information). If you have questions about a medical condition or this instruction, always ask your healthcare professional. [place] [address] any warranty or liability for your use of this information. Patient Education        Learning About COVID-19 and Social Distancing  What is it? Social distancing means putting space between yourself and other people.  The recommended distance is 6 feet, or about 2 meters. This also means staying away from any place where people may gather, such as [place] or other public gathering places. Why is it important? Social distancing is the best way to reduce the spread of COVID-19. This virus seems to spread from person to person through droplets from coughing and sneezing. So if you keep your distance from others, you're less likely to get it or spread it. And social distancing is important for everyone, not just those who are at high risk of infection, like older people. You might have the virus but not have symptoms. You could then give the infection to someone you come into contact with. How is it done? Putting 6 feet, or about 2 meters, between you and other people is the recommended distance. Also stay away from any place where people may gather, such as [place] or other public gathering places. So if possible:  · Work from home, and keep your kids at home. · Don't travel if you don't have to. And avoid public transportation, ride-shares, and taxis unless you have no choice. · Limit shopping to essentials, like food and medicines. · Wear a cloth face cover if you have to go to a public place like the grocery store or pharmacy. · Don't eat in restaurants. (You can still get takeout or food deliveries.)  · Avoid crowds and busy places. Follow stay-at-home orders or other directions for your area. Current as of: July 10, 2020               Content Version: 12.6  © 4946-4571 [employer]. Care instructions adapted under license by [employer] (which disclaims liability or warranty for this information). If you have questions about a medical condition or this instruction, always ask your healthcare professional. [place] [address] any warranty or liability for your use of this information. Patient Education        Pneumonia: Care Instructions  Your Care Instructions     Pneumonia is an infection of the lungs.  Most cases are caused by infections from bacteria or viruses. Pneumonia may be mild or very severe. If it is caused by bacteria, you will be treated with antibiotics. It may take a few weeks to a few months to recover fully from pneumonia, depending on how sick you were and whether your overall health is good. Follow-up care is a key part of your treatment and safety. Be sure to make and go to all appointments, and call your doctor if you are having problems. It's also a good idea to know your test results and keep a list of the medicines you take. How can you care for yourself at home? · Take your antibiotics exactly as directed. Do not stop taking the medicine just because you are feeling better. You need to take the full course of antibiotics. · Take your medicines exactly as prescribed. Call your doctor if you think you are having a problem with your medicine. · Get plenty of rest and sleep. You may feel weak and tired for a while, but your energy level will improve with time. · To prevent dehydration, drink plenty of fluids, enough so that your urine is light yellow or clear like water. Choose water and other caffeine-free clear liquids until you feel better. If you have kidney, heart, or liver disease and have to limit fluids, talk with your doctor before you increase the amount of fluids you drink. · Take care of your cough so you can rest. A cough that brings up mucus from your lungs is common with pneumonia. It is one way your body gets rid of the infection. But if coughing keeps you from resting or causes severe fatigue and chest-wall pain, talk to your doctor. He or she may suggest that you take a medicine to reduce the cough. · Use a vaporizer or humidifier to add moisture to your bedroom. Follow the directions for cleaning the machine. · Do not smoke or allow others to smoke around you. Smoke will make your cough last longer.  If you need help quitting, talk to your doctor about stop-smoking programs and medicines. These can increase your chances of quitting for good. · Take an over-the-counter pain medicine, such as acetaminophen (Tylenol), ibuprofen (Advil, Motrin), or naproxen (Aleve). Read and follow all instructions on the label. · Do not take two or more pain medicines at the same time unless the doctor told you to. Many pain medicines have acetaminophen, which is Tylenol. Too much acetaminophen (Tylenol) can be harmful. · If you were given a spirometer to measure how well your lungs are working, use it as instructed. This can help your doctor tell how your recovery is going. · To prevent pneumonia in the future, talk to your doctor about getting a flu vaccine (once a year) and a pneumococcal vaccine (one time only for most people). When should you call for help? Call 911 anytime you think you may need emergency care. For example, call if:    · You have severe trouble breathing. Call your doctor now or seek immediate medical care if:    · You cough up dark brown or bloody mucus (sputum).     · You have new or worse trouble breathing.     · You are dizzy or lightheaded, or you feel like you may faint. Watch closely for changes in your health, and be sure to contact your doctor if:    · You have a new or higher fever.     · You are coughing more deeply or more often.     · You are not getting better after 2 days (48 hours).     · You do not get better as expected. Where can you learn more? Go to http://www.diop.com/  Enter D336 in the search box to learn more about \"Pneumonia: Care Instructions. \"  Current as of: February 24, 2020               Content Version: 12.6  © 4372-8115 Healthwise, Incorporated. Care instructions adapted under license by [employer] (which disclaims liability or warranty for this information).  If you have questions about a medical condition or this instruction, always ask your healthcare professional. [address] any warranty or liability for your use of this information.

## 2020-12-25 NOTE — PROGRESS NOTES
1900: Bedside and Verbal shift change report given to [employer] (oncoming nurse) by Meliton Little RN(offgoing nurse). Report included the following information SBAR, Kardex, Intake/Output, MAR, Recent Results, Med Rec Status and Cardiac Rhythm Sinus Rhythm. 2000: Patient assessed at bedside. Alert and oriented x4 on room air and tolerating well. No respiratory distress noted. Verbalizes needs with clear speech. Bedside commode privileges with no distress. Call bell is within reach. Droplet plus and safety precautions ae maintained. Will continue to monitor. 2200: Patient remains on room air with positive results. O2 is currently 94% with no distress noted. All due medications administered as ordered with no adverse reaction. Monitoring to continue. 0000: Patient remains on room air with no distress. O2 is currently 90%. Patient denies pain at this time. Monitoring to continue. 0400: Patient remains on room air in stable condition. Current O2 is 92% with no distress. Will continue to monitor. 0700: Bedside and Verbal shift change report given to Kasia RN (oncoming nurse) by [employer] (offgoing nurse). Report included the following information SBAR, Kardex, Intake/Output, Recent Results, Med Rec Status and Cardiac Rhythm 1st Degree.

## 2020-12-25 NOTE — PROGRESS NOTES
Bedside shift change report given to TODD Pedroza (oncoming nurse) by Teofilo Serrano RN (offgoing nurse). Report included the following information SBAR, Kardex, Intake/Output, MAR and Recent Results. 7642 -- AM medications given, well tolerated. Patient educated on the importance of wearing a mask, self quarantining, and hand washing, patient verbalized understanding. [id number] -- Patient discharged education completed, verbalized understanding, prescriptions given, IV D/C'd, patient taken downstairs.

## 2020-12-25 NOTE — PROGRESS NOTES
Problem: Discharge Planning  Goal: *Discharge to safe environment  Outcome: Progressing Towards Goal  Plan is home    Pt dc'd home, no services ordered, no O2 needs. Care Management Interventions  PCP Verified by CM: No  Palliative Care Criteria Met (RRAT>21 & CHF Dx)?: No  Mode of Transport at Discharge:  Other (see comment)(tbd)  Transition of Care Consult (CM Consult): Discharge Planning  Discharge Durable Medical Equipment: No  Physical Therapy Consult: No  Occupational Therapy Consult: No  Speech Therapy Consult: No  Current Support Network: Lives with Spouse  Confirm Follow Up Transport: Family  The Plan for Transition of Care is Related to the Following Treatment Goals : resolution of acute symptoms  The Patient and/or Patient Representative was Provided with a Choice of Provider and Agrees with the Discharge Plan?: No  Freedom of Choice List was Provided with Basic Dialogue that Supports the Patient's Individualized Plan of Care/Goals, Treatment Preferences and Shares the Quality Data Associated with the Providers?: No  Discharge Location  Discharge Placement: Home

## 2020-12-25 NOTE — PROGRESS NOTES
Problem: Discharge Planning  Goal: *Discharge to safe environment  Outcome: Resolved/Met     Problem: Falls - Risk of  Goal: *Absence of Falls  Description: Document Hiram Johns Fall Risk and appropriate interventions in the flowsheet. Outcome: Resolved/Met     Problem: Patient Education: Go to Patient Education Activity  Goal: Patient/Family Education  Outcome: Resolved/Met     Problem: Airway Clearance - Ineffective  Goal: Achieve or maintain patent airway  Outcome: Resolved/Met     Problem: Gas Exchange - Impaired  Goal: Absence of hypoxia  Outcome: Resolved/Met  Goal: Promote optimal lung function  Outcome: Resolved/Met     Problem: Breathing Pattern - Ineffective  Goal: Ability to achieve and maintain a regular respiratory rate  Outcome: Resolved/Met     Problem:  Body Temperature -  Risk of, Imbalanced  Goal: Ability to maintain a body temperature within defined limits  Outcome: Resolved/Met  Goal: Will regain or maintain usual level of consciousness  Outcome: Resolved/Met  Goal: Complications related to the disease process, condition or treatment will be avoided or minimized  Outcome: Resolved/Met     Problem: Isolation Precautions - Risk of Spread of Infection  Goal: Prevent transmission of infectious organism to others  Outcome: Resolved/Met     Problem: Nutrition Deficits  Goal: Optimize nutrtional status  Outcome: Resolved/Met     Problem: Risk for Fluid Volume Deficit  Goal: Maintain normal heart rhythm  Outcome: Resolved/Met  Goal: Maintain absence of muscle cramping  Outcome: Resolved/Met  Goal: Maintain normal serum potassium, sodium, calcium, phosphorus, and pH  Outcome: Resolved/Met     Problem: Loneliness or Risk for Loneliness  Goal: Demonstrate positive use of time alone when socialization is not possible  Outcome: Resolved/Met     Problem: Fatigue  Goal: Verbalize increase energy and improved vitality  Outcome: Resolved/Met     Problem: Patient Education: Go to Patient Education Activity  Goal: Patient/Family Education  Outcome: Resolved/Met     Problem: Pain  Goal: *Control of Pain  Outcome: Resolved/Met  Goal: *PALLIATIVE CARE:  Alleviation of Pain  Outcome: Resolved/Met     Problem: Patient Education: Go to Patient Education Activity  Goal: Patient/Family Education  Outcome: Resolved/Met

## 2020-12-26 ENCOUNTER — PATIENT OUTREACH (OUTPATIENT)
Dept: CASE MANAGEMENT | Age: 65
End: 2020-12-26

## 2021-01-09 NOTE — DISCHARGE SUMMARY
[address]    Name:  Jonas Santos  MR#:   [id number]  :  1955  ACCOUNT #:  [de-identified]  ADMIT DATE:  2020  DISCHARGE DATE:  2020    ADMITTING DIAGNOSIS:  COVID pneumonia. HISTORY OF PRESENT ILLNESS:  The patient is a 58-year-old female who presented to the emergency department with cough and shortness of breath. She had been at a gathering about 2 weeks prior to admission. There were multiple people at the gathering who did not have masks. Shortly, thereafter all of them had COVID. She began having shortness of breath and chills. She called EMS who brought her to the Emergency Department after finding her to be hypoxic. In the emergency room, she was found to have COVID pneumonia, and she was admitted for ongoing management. HOSPITAL COURSE:  Infectious Disease consultation was obtained. The patient was treated with Decadron. She was also given remdesivir. She tolerated these well. As the days went by, she became more comfortable. Ultimately, she was able to wean off of oxygen. She completed her remdesivir treatment. By 2020, she was off oxygen. She was feeling much better, and she was strongly advocating for discharge home. She was discharged home. DIAGNOSIS:  COVID pneumonia, improved. CONDITION ON DISCHARGE:  Improved. ACTIVITY:  Ad kairn. FOLLOWUP:  Follow up is with her primary care provider in 1 week, the patient will arrange. DISCHARGE MEDICATIONS:  Medications at the time of discharge are:  1. Benadryl 25 mg by mouth daily as needed. 2.  Decadron taper 6 mg by mouth daily for 3 days, then 4 mg by mouth daily for 3 days, then 2 mg by mouth daily for 3 days, then stop. 3.  Hydrocodone/acetaminophen 5/300 one tablet by mouth every 6 hours as needed for pain. DIET:  Regular.     Total Discharge time 35 minutes      Christine Menezes MD      [id number]  D:  2021 17:14  T:  2021 19:52  JOB #: [id number]

## 2021-11-12 ENCOUNTER — OFFICE VISIT (OUTPATIENT)
Dept: FAMILY MEDICINE CLINIC | Age: 66
End: 2021-11-12
Payer: MEDICARE

## 2021-11-12 ENCOUNTER — HOSPITAL ENCOUNTER (OUTPATIENT)
Dept: LAB | Age: 66
Discharge: HOME | End: 2021-11-12
Payer: MEDICARE

## 2021-11-12 VITALS
BODY MASS INDEX: 29.26 KG/M2 | OXYGEN SATURATION: 97 % | DIASTOLIC BLOOD PRESSURE: 67 MMHG | HEART RATE: 68 BPM | WEIGHT: 186.4 LBS | HEIGHT: 67 IN | RESPIRATION RATE: 18 BRPM | SYSTOLIC BLOOD PRESSURE: 119 MMHG | TEMPERATURE: 97.3 F

## 2021-11-12 DIAGNOSIS — R73.9 BLOOD GLUCOSE ELEVATED: ICD-10-CM

## 2021-11-12 DIAGNOSIS — Z13.29 SCREENING FOR THYROID DISORDER: ICD-10-CM

## 2021-11-12 DIAGNOSIS — Z13.220 SCREENING FOR HYPERLIPIDEMIA: ICD-10-CM

## 2021-11-12 DIAGNOSIS — Z13.1 SCREENING FOR DIABETES MELLITUS: ICD-10-CM

## 2021-11-12 DIAGNOSIS — Z12.31 ENCOUNTER FOR SCREENING MAMMOGRAM FOR MALIGNANT NEOPLASM OF BREAST: ICD-10-CM

## 2021-11-12 DIAGNOSIS — Z86.16 HISTORY OF COVID-19: Primary | ICD-10-CM

## 2021-11-12 DIAGNOSIS — Z00.00 PREVENTATIVE HEALTH CARE: ICD-10-CM

## 2021-11-12 DIAGNOSIS — Z23 ENCOUNTER FOR IMMUNIZATION: ICD-10-CM

## 2021-11-12 PROBLEM — M72.2 PLANTAR FASCIAL FIBROMATOSIS: Status: ACTIVE | Noted: 2021-11-12

## 2021-11-12 PROBLEM — J02.9 PHARYNGITIS: Status: ACTIVE | Noted: 2021-11-12

## 2021-11-12 PROBLEM — D23.9 BENIGN NEOPLASM OF SKIN: Status: ACTIVE | Noted: 2021-11-12

## 2021-11-12 PROBLEM — F32.89 OTHER DEPRESSIVE DISORDER: Status: ACTIVE | Noted: 2021-11-12

## 2021-11-12 PROBLEM — R79.89 ABNORMAL LIVER FUNCTION TEST: Status: ACTIVE | Noted: 2021-11-12

## 2021-11-12 PROBLEM — L30.9 ECZEMA: Status: ACTIVE | Noted: 2021-11-12

## 2021-11-12 PROBLEM — N95.1 SYMPTOMATIC MENOPAUSAL OR FEMALE CLIMACTERIC STATES: Status: ACTIVE | Noted: 2021-11-12

## 2021-11-12 LAB
ALBUMIN SERPL-MCNC: 4.3 G/DL (ref 3.4–5)
ALBUMIN/GLOB SERPL: 1.4 {RATIO} (ref 0.8–1.7)
ALP SERPL-CCNC: 98 U/L (ref 45–117)
ALT SERPL-CCNC: 26 U/L (ref 13–56)
ANION GAP SERPL CALC-SCNC: 8 MMOL/L (ref 3–18)
APPEARANCE UR: CLEAR
AST SERPL-CCNC: 22 U/L (ref 10–38)
BACTERIA URNS QL MICRO: ABNORMAL /HPF
BASOPHILS # BLD: 0 K/UL (ref 0–0.1)
BASOPHILS NFR BLD: 0 % (ref 0–2)
BILIRUB SERPL-MCNC: 0.7 MG/DL (ref 0.2–1)
BILIRUB UR QL: NEGATIVE
BUN SERPL-MCNC: 20 MG/DL (ref 7–18)
BUN/CREAT SERPL: 31 (ref 12–20)
CALCIUM SERPL-MCNC: 9.3 MG/DL (ref 8.5–10.1)
CHLORIDE SERPL-SCNC: 105 MMOL/L (ref 100–111)
CHOLEST SERPL-MCNC: 217 MG/DL
CO2 SERPL-SCNC: 28 MMOL/L (ref 21–32)
COLOR UR: YELLOW
CREAT SERPL-MCNC: 0.64 MG/DL (ref 0.6–1.3)
DIFFERENTIAL METHOD BLD: NORMAL
EOSINOPHIL # BLD: 0.1 K/UL (ref 0–0.4)
EOSINOPHIL NFR BLD: 2 % (ref 0–5)
EPITH CASTS URNS QL MICRO: ABNORMAL /LPF (ref 0–5)
ERYTHROCYTE [DISTWIDTH] IN BLOOD BY AUTOMATED COUNT: 12.7 % (ref 11.6–14.5)
EST. AVERAGE GLUCOSE BLD GHB EST-MCNC: 105 MG/DL
GLOBULIN SER CALC-MCNC: 3.1 G/DL (ref 2–4)
GLUCOSE SERPL-MCNC: 109 MG/DL (ref 74–99)
GLUCOSE UR STRIP.AUTO-MCNC: NEGATIVE MG/DL
HBA1C MFR BLD: 5.3 % (ref 4.2–5.6)
HCT VFR BLD AUTO: 40 % (ref 35–45)
HDLC SERPL-MCNC: 66 MG/DL (ref 40–60)
HDLC SERPL: 3.3 {RATIO} (ref 0–5)
HGB BLD-MCNC: 12.7 G/DL (ref 12–16)
HGB UR QL STRIP: ABNORMAL
IMM GRANULOCYTES # BLD AUTO: 0 K/UL (ref 0–0.04)
IMM GRANULOCYTES NFR BLD AUTO: 0 % (ref 0–0.5)
KETONES UR QL STRIP.AUTO: NEGATIVE MG/DL
LDLC SERPL CALC-MCNC: 135.2 MG/DL (ref 0–100)
LEUKOCYTE ESTERASE UR QL STRIP.AUTO: ABNORMAL
LIPID PROFILE,FLP: ABNORMAL
LYMPHOCYTES # BLD: 1.8 K/UL (ref 0.9–3.6)
LYMPHOCYTES NFR BLD: 38 % (ref 21–52)
MCH RBC QN AUTO: 27.9 PG (ref 24–34)
MCHC RBC AUTO-ENTMCNC: 31.8 G/DL (ref 31–37)
MCV RBC AUTO: 87.7 FL (ref 78–100)
MONOCYTES # BLD: 0.4 K/UL (ref 0.05–1.2)
MONOCYTES NFR BLD: 8 % (ref 3–10)
NEUTS SEG # BLD: 2.5 K/UL (ref 1.8–8)
NEUTS SEG NFR BLD: 52 % (ref 40–73)
NITRITE UR QL STRIP.AUTO: NEGATIVE
NRBC # BLD: 0 K/UL (ref 0–0.01)
NRBC BLD-RTO: 0 PER 100 WBC
PH UR STRIP: 5.5 [PH] (ref 5–8)
PLATELET # BLD AUTO: 285 K/UL (ref 135–420)
PMV BLD AUTO: 10.1 FL (ref 9.2–11.8)
POTASSIUM SERPL-SCNC: 3.6 MMOL/L (ref 3.5–5.5)
PROT SERPL-MCNC: 7.4 G/DL (ref 6.4–8.2)
PROT UR STRIP-MCNC: ABNORMAL MG/DL
RBC # BLD AUTO: 4.56 M/UL (ref 4.2–5.3)
RBC #/AREA URNS HPF: ABNORMAL /HPF (ref 0–5)
SODIUM SERPL-SCNC: 141 MMOL/L (ref 136–145)
SP GR UR REFRACTOMETRY: 1.01 (ref 1–1.03)
T4 FREE SERPL-MCNC: 0.9 NG/DL (ref 0.7–1.5)
TRIGL SERPL-MCNC: 79 MG/DL (ref ?–150)
TSH SERPL DL<=0.05 MIU/L-ACNC: 1.08 UIU/ML (ref 0.36–3.74)
UROBILINOGEN UR QL STRIP.AUTO: 0.2 EU/DL (ref 0.2–1)
VLDLC SERPL CALC-MCNC: 15.8 MG/DL
WBC # BLD AUTO: 4.8 K/UL (ref 4.6–13.2)
WBC URNS QL MICRO: ABNORMAL /HPF (ref 0–4)

## 2021-11-12 PROCEDURE — 84443 ASSAY THYROID STIM HORMONE: CPT

## 2021-11-12 PROCEDURE — 84439 ASSAY OF FREE THYROXINE: CPT

## 2021-11-12 PROCEDURE — G8400 PT W/DXA NO RESULTS DOC: HCPCS | Performed by: NURSE PRACTITIONER

## 2021-11-12 PROCEDURE — G8419 CALC BMI OUT NRM PARAM NOF/U: HCPCS | Performed by: NURSE PRACTITIONER

## 2021-11-12 PROCEDURE — 90694 VACC AIIV4 NO PRSRV 0.5ML IM: CPT | Performed by: NURSE PRACTITIONER

## 2021-11-12 PROCEDURE — 3017F COLORECTAL CA SCREEN DOC REV: CPT | Performed by: NURSE PRACTITIONER

## 2021-11-12 PROCEDURE — G8432 DEP SCR NOT DOC, RNG: HCPCS | Performed by: NURSE PRACTITIONER

## 2021-11-12 PROCEDURE — 1101F PT FALLS ASSESS-DOCD LE1/YR: CPT | Performed by: NURSE PRACTITIONER

## 2021-11-12 PROCEDURE — G8427 DOCREV CUR MEDS BY ELIG CLIN: HCPCS | Performed by: NURSE PRACTITIONER

## 2021-11-12 PROCEDURE — G8536 NO DOC ELDER MAL SCRN: HCPCS | Performed by: NURSE PRACTITIONER

## 2021-11-12 PROCEDURE — 99203 OFFICE O/P NEW LOW 30 MIN: CPT | Performed by: NURSE PRACTITIONER

## 2021-11-12 PROCEDURE — 80053 COMPREHEN METABOLIC PANEL: CPT

## 2021-11-12 PROCEDURE — 83036 HEMOGLOBIN GLYCOSYLATED A1C: CPT

## 2021-11-12 PROCEDURE — 81001 URINALYSIS AUTO W/SCOPE: CPT

## 2021-11-12 PROCEDURE — 85025 COMPLETE CBC W/AUTO DIFF WBC: CPT

## 2021-11-12 PROCEDURE — G0008 ADMIN INFLUENZA VIRUS VAC: HCPCS | Performed by: NURSE PRACTITIONER

## 2021-11-12 PROCEDURE — 90732 PPSV23 VACC 2 YRS+ SUBQ/IM: CPT | Performed by: NURSE PRACTITIONER

## 2021-11-12 PROCEDURE — 1090F PRES/ABSN URINE INCON ASSESS: CPT | Performed by: NURSE PRACTITIONER

## 2021-11-12 PROCEDURE — 36415 COLL VENOUS BLD VENIPUNCTURE: CPT

## 2021-11-12 PROCEDURE — 80061 LIPID PANEL: CPT

## 2021-11-12 PROCEDURE — G9899 SCRN MAM PERF RSLTS DOC: HCPCS | Performed by: NURSE PRACTITIONER

## 2021-11-12 NOTE — PROGRESS NOTES
OFFICE NOTE    Enmanuel Larsen is a 73 y.o. female presenting today for office visit. 11/12/2021  9:07 AM    Chief Complaint   Patient presents with    New Patient     HPI:   Patient new to clinic and establishing care. She feels well and looks well. Patient had a history of Covid infection 12/2020. Patient says on 5/2021 she a had a severe bloody nose. She thinks it is from being treated with blood thinners in 12/2020 for covid. She has stopped taking motrin because she thinks it may cause her to bleed too. After having covid she thinks sometimes she forgets words and her energy is not as good as before covid. She gets easily fatigued on long walks, when then before covid. She would like a comparative xray of her lungs. 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 Patients last menstrual period was, stopped bleeding in 25s. Smokes tobacco no, drinks alcohol occasionally or no illicit drugs or marijuana. Sexually active  and 5 children. Patient reports appetite is good, no urinary issues, sleeps well and regular bowel movements. Patient denies fever, chills, chest pain, shortness of breath, abdomen pain or dark tarry stool.     Covid vaccine, done   Flu vaccine, today  Shingles, at pharmacy   DTaP  PPSV23, today  Pap smear, due, will schedule   Breast cancer screening, ordered, she will get at [facility] screening, due 2/2026    ROS:    · General: negative for - chills, fever, weight changes or malaise  · HEENT: no sore throat, nasal congestion, vision problems or ear problems  · Respiratory: no cough, shortness of breath, or wheezing  · Cardiovascular: no chest pain, palpitations, or dyspnea on exertion  · Gastrointestinal: no abdominal pain, N/V, change in bowel habits, or black or bloody stools  · Musculoskeletal: no back pain, joint pain, joint stiffness, muscle pain or muscle weakness  · Neurological: no numbness, tingling, headache or dizziness  · Endo:  No polyuria or polydipsia. · : no hematuria, dysuria, frequency, hesitancy, or nocturia. · Skin: No itching or rash, no open skin, no unusual lesions   · Psychological: negative for - anxiety, depression, sleep disturbances, suicidal or homicidal ideations     PHQ Screening   No flowsheet data found. History  Past Medical History:   Diagnosis Date    Nausea & vomiting        Past Surgical History:   Procedure Laterality Date    HX COLONOSCOPY      HX GYN Left     oophorectomy    HX TONSILLECTOMY      HX UROLOGICAL      Lithotripsy    HX UROLOGICAL      Cystoscopy       Social History     Socioeconomic History    Marital status:      Spouse name: Not on file    Number of children: Not on file    Years of education: Not on file    Highest education level: Not on file   Occupational History    Not on file   Tobacco Use    Smoking status: Former Smoker     Quit date: 1991     Years since quittin.3    Smokeless tobacco: Never Used   Vaping Use    Vaping Use: Never used   Substance and Sexual Activity    Alcohol use: Never    Drug use: Not Currently     Types: Hallucinogenics, Marijuana     Comment: in her 25s    Sexual activity: Not on file   Other Topics Concern    Not on file   Social History Narrative    Not on file     Social Determinants of Health     Financial Resource Strain:     Difficulty of Paying Living Expenses: Not on file   Food Insecurity:     Worried About Running Out of Food in the Last Year: Not on file    Reanna of Food in the Last Year: Not on file   Transportation Needs:     Lack of Transportation (Medical): Not on file    Lack of Transportation (Non-Medical):  Not on file   Physical Activity:     Days of Exercise per Week: Not on file    Minutes of Exercise per Session: Not on file   Stress:     Feeling of Stress : Not on file   Social Connections:     Frequency of Communication with Friends and Family: Not on file    Frequency of Social Gatherings with Friends and Family: Not on file    Attends [employer] Services: Not on file    Active Member of Clubs or Organizations: Not on file    Attends Club or Organization Meetings: Not on file    Marital Status: Not on file   Intimate Partner Violence:     Fear of Current or Ex-Partner: Not on file    Emotionally Abused: Not on file    Physically Abused: Not on file    Sexually Abused: Not on file   Housing Stability:     Unable to Pay for Housing in the Last Year: Not on file    Number of [place] in the Last Year: Not on file    Unstable Housing in the Last Year: Not on file       No Known Allergies    Current Outpatient Medications   Medication Sig Dispense Refill    ergocalciferol (Vitamin D2) 1,250 mcg (50,000 unit) capsule Take 50,000 Units by mouth every seven (7) days.  diphenhydrAMINE (BenadryL) 25 mg capsule Take 25 mg by mouth nightly as needed.  dexAMETHasone (DECADRON) 2 mg tablet 3 tablets by mouth daily for three days, then  2 tablets by mouth daily for three days, then  1 tablet by mouth daily for three days. Then stop. (Patient not taking: Reported on 11/12/2021) 18 Tab 8    HYDROcodone-acetaminophen (XODOL) 5-300 mg tablet Take 1 Tab by mouth every six (6) hours as needed for Pain for up to 7 days. Max Daily Amount: 4 Tabs. 21 Tab 0    VITAMIN B COMPLEX PO Take  by mouth. (Patient not taking: Reported on 11/12/2021)       Patient Care Team:  Patient Care Team:  Kevan Gambino NP as PCP - General (Nurse Practitioner)  Malcolm York MD as PCP - FAMILY PRACTICE    LABS:  None new to review    RADIOLOGY:  None new to review    Physical Exam  Patient appears well, she is pleasant, alert, oriented x 3, in no distress. ENT normal.  Neck supple. No adenopathy or thyromegaly. TAMARA. Lungs are clear, good air entry, no wheezes, rhonchi or rales. Cardiovascular, S1 and S2 normal, no murmurs, regular rate and rhythm.  No LAD.  Chest wall negative for tenderness  Abdomen is soft without tenderness  /Anorectal, deferred. Muscleskeletal, no swelling, no tenderness, no injury. Extremities show no edema  Neurological is normal without focal findings. Skin: no concerning lesions. Psych: normal affect. Mood good. Oriented x 3. Judgement and insight intact. Vitals:    11/12/21 0947 11/12/21 0959   BP: (!) 132/48 119/67   Pulse: 68    Resp: 18    Temp: 97.3 °F (36.3 °C)    TempSrc: Temporal    SpO2: 97%    Weight: 186 lb 6.4 oz (84.6 kg)    Height: 5' 7\" (1.702 m)    PainSc:   0 - No pain      Assessment and Plan    Diagnoses and all orders for this visit:    History of COVID-19  -     XR CHEST PA LAT; Future    Preventative health care  -     CBC WITH AUTOMATED DIFF; Future  -     METABOLIC PANEL, COMPREHENSIVE; Future  -     URINALYSIS W/ RFLX MICROSCOPIC; Future    Screening for thyroid disorder  -     T4, FREE; Future  -     TSH 3RD GENERATION; Future    Screening for hyperlipidemia  -     LIPID PANEL; Future    Screening for diabetes mellitus  -     HEMOGLOBIN A1C WITH EAG; Future    Blood glucose elevated  -     HEMOGLOBIN A1C WITH EAG; Future    Encounter for immunization  -     INFLUENZA VIRUS VACCINE, HIGH DOSE SEASONAL, PRESERVATIVE FREE  -     PNEUMOCOCCAL POLYSACCHARIDE VACCINE, 23-VALENT, ADULT OR IMMUNOSUPPRESSED PT DOSE,    Encounter for screening mammogram for malignant neoplasm of breast  -     [facility] 3D JENNIFER W MAMMO BI SCREENING INCL CAD; Future         *Plan of care reviewed with patient. Patient in agreement with plan and expresses understanding. All questions answered and patient encouraged to call or RTO if further questions or concerns. 50% of 30 minutes spent on counseling and managing her care. Follow-up and Dispositions    · Return in about 2 months (around 1/12/2022) for return in 2 mos for lab results and mammogram and chest xay .        JOSE MariC

## 2021-11-12 NOTE — PROGRESS NOTES
After obtaining consent, and per orders of RONALDO Matute, injection of Influenza Vaccine and Pneumovax 23 given by Ashley Guzman III. Patient instructed to remain in clinic for 20 minutes afterwards, and to report any adverse reaction to me immediately. Depression Screening:  No flowsheet data found. Learning Assessment:  No flowsheet data found. Abuse Screening:  Abuse Screening Questionnaire 11/12/2021   Do you ever feel afraid of your partner? N   Are you in a relationship with someone who physically or mentally threatens you? N   Is it safe for you to go home? Y       Fall Risk  Fall Risk Assessment, last 12 mths 11/12/2021   Able to walk? Yes   Fall in past 12 months? 0       ADL  No flowsheet data found. Travel Screening:    Travel Screening     Question   Response    In the last month, have you been in contact with someone who was confirmed or suspected to have Coronavirus / COVID-19? No / Unsure    Have you had a COVID-19 viral test in the last 14 days? No    Do you have any of the following new or worsening symptoms? None of these    Have you traveled internationally or domestically in the last month? No      Travel History   Travel since 10/12/21    No documented travel since 10/12/21         Health Maintenance reviewed and discussed and ordered per Provider. Health Maintenance Due   Topic Date Due    Hepatitis C Screening  Never done    COVID-19 Vaccine (1) Never done    Cervical cancer screen  Never done    Lipid Screen  Never done    Shingrix Vaccine Age 50> (1 of 2) Never done    Breast Cancer Screen Mammogram  Never done    Bone Densitometry (Dexa) Screening  Never done    Pneumococcal 65+ years (1 of 1 - PPSV23) Never done    Flu Vaccine (1) 09/01/2021   .     Hollis Osman presents today for   Chief Complaint   Patient presents with    New Patient       Is someone accompanying this pt? no    Is the patient using any DME equipment during OV? no    Coordination of Care:  1. Have you been to the ER, urgent care clinic since your last visit? Hospitalized since your last visit? no    2. Have you seen or consulted any other health care providers outside of the [address] [place] [address] since your last visit? Include any pap smears or colon screening.  no

## 2021-12-06 DIAGNOSIS — R31.21 ASYMPTOMATIC MICROSCOPIC HEMATURIA: Primary | ICD-10-CM

## 2021-12-06 NOTE — PROGRESS NOTES
Please call patient. A1c normal at 5.3%. Total cholesterol high at 217 and LDL high at 135.2. CMP glucose was elevated at 109 BUN creatinine ratio elevated at 39, this has been a trend for you. Please drink plenty of water. TSH and T4 within normal limits. CBC within normal limits. Urinalysis trace of protein, blood and leukocyte esterase and some bacteria. Do you have any symptoms of burning on urination frequency or urgency. Do you have a history of having blood in your urine?

## 2022-01-13 ENCOUNTER — HOSPITAL ENCOUNTER (OUTPATIENT)
Dept: MAMMOGRAPHY | Age: 67
Discharge: HOME | End: 2022-01-13
Attending: NURSE PRACTITIONER
Payer: MEDICARE

## 2022-01-13 DIAGNOSIS — Z12.31 ENCOUNTER FOR SCREENING MAMMOGRAM FOR MALIGNANT NEOPLASM OF BREAST: ICD-10-CM

## 2022-01-13 PROCEDURE — 77063 BREAST TOMOSYNTHESIS BI: CPT

## 2022-03-18 PROBLEM — L30.9 ECZEMA: Status: ACTIVE | Noted: 2021-11-12

## 2022-03-18 PROBLEM — J12.82 PNEUMONIA DUE TO COVID-19 VIRUS: Status: ACTIVE | Noted: 2020-12-20

## 2022-03-18 PROBLEM — U07.1 PNEUMONIA DUE TO COVID-19 VIRUS: Status: ACTIVE | Noted: 2020-12-20

## 2022-03-18 PROBLEM — J02.9 PHARYNGITIS: Status: ACTIVE | Noted: 2021-11-12

## 2022-03-18 PROBLEM — D23.9 BENIGN NEOPLASM OF SKIN: Status: ACTIVE | Noted: 2021-11-12

## 2022-03-19 PROBLEM — U07.1 ACUTE HYPOXEMIC RESPIRATORY FAILURE DUE TO COVID-19 (HCC): Status: ACTIVE | Noted: 2020-12-20

## 2022-03-19 PROBLEM — M72.2 PLANTAR FASCIAL FIBROMATOSIS: Status: ACTIVE | Noted: 2021-11-12

## 2022-03-19 PROBLEM — J96.01 ACUTE HYPOXEMIC RESPIRATORY FAILURE DUE TO COVID-19 (HCC): Status: ACTIVE | Noted: 2020-12-20

## 2022-03-20 PROBLEM — E87.6 HYPOKALEMIA: Status: ACTIVE | Noted: 2020-12-20

## 2022-03-20 PROBLEM — R79.89 ABNORMAL LIVER FUNCTION TEST: Status: ACTIVE | Noted: 2021-11-12

## 2022-03-20 PROBLEM — N95.1 SYMPTOMATIC MENOPAUSAL OR FEMALE CLIMACTERIC STATES: Status: ACTIVE | Noted: 2021-11-12

## 2022-06-20 ENCOUNTER — HOSPITAL ENCOUNTER (OUTPATIENT)
Dept: LAB | Age: 67
Discharge: HOME | End: 2022-06-20
Payer: MEDICARE

## 2022-06-20 ENCOUNTER — TRANSCRIBE ORDER (OUTPATIENT)
Dept: LAB | Age: 67
End: 2022-06-20

## 2022-06-20 DIAGNOSIS — N20.0 KIDNEY STONE: Primary | ICD-10-CM

## 2022-06-20 DIAGNOSIS — N20.0 KIDNEY STONE: ICD-10-CM

## 2022-06-20 LAB
ANION GAP SERPL CALC-SCNC: 1 MMOL/L (ref 3–18)
APTT PPP: 29.5 SEC (ref 23–36.4)
ATRIAL RATE: 61 BPM
BASOPHILS # BLD: 0 K/UL (ref 0–0.1)
BASOPHILS NFR BLD: 1 % (ref 0–2)
BUN SERPL-MCNC: 14 MG/DL (ref 7–18)
BUN/CREAT SERPL: 30 (ref 12–20)
CALCIUM SERPL-MCNC: 9.4 MG/DL (ref 8.5–10.1)
CALCULATED P AXIS, ECG09: 51 DEGREES
CALCULATED R AXIS, ECG10: 16 DEGREES
CALCULATED T AXIS, ECG11: 30 DEGREES
CHLORIDE SERPL-SCNC: 111 MMOL/L (ref 100–111)
CO2 SERPL-SCNC: 29 MMOL/L (ref 21–32)
CREAT SERPL-MCNC: 0.47 MG/DL (ref 0.6–1.3)
DIAGNOSIS, 93000: NORMAL
DIFFERENTIAL METHOD BLD: NORMAL
EOSINOPHIL # BLD: 0.1 K/UL (ref 0–0.4)
EOSINOPHIL NFR BLD: 2 % (ref 0–5)
ERYTHROCYTE [DISTWIDTH] IN BLOOD BY AUTOMATED COUNT: 12.6 % (ref 11.6–14.5)
GLUCOSE SERPL-MCNC: 101 MG/DL (ref 74–99)
HCT VFR BLD AUTO: 38.4 % (ref 35–45)
HGB BLD-MCNC: 12.8 G/DL (ref 12–16)
IMM GRANULOCYTES # BLD AUTO: 0 K/UL (ref 0–0.04)
IMM GRANULOCYTES NFR BLD AUTO: 0 % (ref 0–0.5)
INR PPP: 0.9 (ref 0.8–1.2)
LYMPHOCYTES # BLD: 1.5 K/UL (ref 0.9–3.6)
LYMPHOCYTES NFR BLD: 28 % (ref 21–52)
MCH RBC QN AUTO: 28.7 PG (ref 24–34)
MCHC RBC AUTO-ENTMCNC: 33.3 G/DL (ref 31–37)
MCV RBC AUTO: 86.1 FL (ref 78–100)
MONOCYTES # BLD: 0.4 K/UL (ref 0.05–1.2)
MONOCYTES NFR BLD: 7 % (ref 3–10)
NEUTS SEG # BLD: 3.4 K/UL (ref 1.8–8)
NEUTS SEG NFR BLD: 62 % (ref 40–73)
NRBC # BLD: 0 K/UL (ref 0–0.01)
NRBC BLD-RTO: 0 PER 100 WBC
P-R INTERVAL, ECG05: 178 MS
PLATELET # BLD AUTO: 279 K/UL (ref 135–420)
PMV BLD AUTO: 9.9 FL (ref 9.2–11.8)
POTASSIUM SERPL-SCNC: 4.4 MMOL/L (ref 3.5–5.5)
PROTHROMBIN TIME: 13 SEC (ref 11.5–15.2)
Q-T INTERVAL, ECG07: 454 MS
QRS DURATION, ECG06: 86 MS
QTC CALCULATION (BEZET), ECG08: 457 MS
RBC # BLD AUTO: 4.46 M/UL (ref 4.2–5.3)
SODIUM SERPL-SCNC: 141 MMOL/L (ref 136–145)
VENTRICULAR RATE, ECG03: 61 BPM
WBC # BLD AUTO: 5.4 K/UL (ref 4.6–13.2)

## 2022-06-20 PROCEDURE — 85610 PROTHROMBIN TIME: CPT

## 2022-06-20 PROCEDURE — 85025 COMPLETE CBC W/AUTO DIFF WBC: CPT

## 2022-06-20 PROCEDURE — 85730 THROMBOPLASTIN TIME PARTIAL: CPT

## 2022-06-20 PROCEDURE — 80048 BASIC METABOLIC PNL TOTAL CA: CPT

## 2022-06-20 PROCEDURE — 36415 COLL VENOUS BLD VENIPUNCTURE: CPT

## 2022-06-20 PROCEDURE — 93005 ELECTROCARDIOGRAM TRACING: CPT

## 2022-11-18 ENCOUNTER — OFFICE VISIT (OUTPATIENT)
Dept: FAMILY MEDICINE CLINIC | Age: 67
End: 2022-11-18
Payer: MEDICARE

## 2022-11-18 ENCOUNTER — HOSPITAL ENCOUNTER (OUTPATIENT)
Dept: LAB | Age: 67
Discharge: HOME | End: 2022-11-18
Payer: MEDICARE

## 2022-11-18 VITALS
BODY MASS INDEX: 30.67 KG/M2 | HEART RATE: 60 BPM | RESPIRATION RATE: 16 BRPM | TEMPERATURE: 97.3 F | SYSTOLIC BLOOD PRESSURE: 130 MMHG | OXYGEN SATURATION: 98 % | DIASTOLIC BLOOD PRESSURE: 75 MMHG | WEIGHT: 195.4 LBS | HEIGHT: 67 IN

## 2022-11-18 DIAGNOSIS — Z13.220 SCREENING FOR HYPERLIPIDEMIA: ICD-10-CM

## 2022-11-18 DIAGNOSIS — Z13.29 SCREENING FOR THYROID DISORDER: ICD-10-CM

## 2022-11-18 DIAGNOSIS — Z71.89 ADVANCED DIRECTIVES, COUNSELING/DISCUSSION: Primary | ICD-10-CM

## 2022-11-18 DIAGNOSIS — Z78.0 POST-MENOPAUSAL: ICD-10-CM

## 2022-11-18 DIAGNOSIS — Z00.00 PREVENTATIVE HEALTH CARE: ICD-10-CM

## 2022-11-18 DIAGNOSIS — R94.120 ABNORMAL HEARING SCREEN: ICD-10-CM

## 2022-11-18 DIAGNOSIS — Z23 ENCOUNTER FOR IMMUNIZATION: ICD-10-CM

## 2022-11-18 DIAGNOSIS — Z78.9 FULL CODE STATUS: ICD-10-CM

## 2022-11-18 DIAGNOSIS — Z00.00 MEDICARE ANNUAL WELLNESS VISIT, SUBSEQUENT: ICD-10-CM

## 2022-11-18 DIAGNOSIS — Z87.442 HISTORY OF KIDNEY STONES: ICD-10-CM

## 2022-11-18 DIAGNOSIS — E87.6 HYPOKALEMIA: ICD-10-CM

## 2022-11-18 DIAGNOSIS — Z23 NEEDS FLU SHOT: ICD-10-CM

## 2022-11-18 LAB
ALBUMIN SERPL-MCNC: 4.1 G/DL (ref 3.4–5)
ALBUMIN/GLOB SERPL: 1.3 {RATIO} (ref 0.8–1.7)
ALP SERPL-CCNC: 110 U/L (ref 45–117)
ALT SERPL-CCNC: 27 U/L (ref 13–56)
ANION GAP SERPL CALC-SCNC: 6 MMOL/L (ref 3–18)
APPEARANCE UR: ABNORMAL
AST SERPL-CCNC: 19 U/L (ref 10–38)
BACTERIA URNS QL MICRO: ABNORMAL /HPF
BILIRUB SERPL-MCNC: 0.5 MG/DL (ref 0.2–1)
BILIRUB UR QL: NEGATIVE
BUN SERPL-MCNC: 15 MG/DL (ref 7–18)
BUN/CREAT SERPL: 25 (ref 12–20)
CALCIUM SERPL-MCNC: 8.8 MG/DL (ref 8.5–10.1)
CHLORIDE SERPL-SCNC: 108 MMOL/L (ref 100–111)
CHOLEST SERPL-MCNC: 220 MG/DL
CO2 SERPL-SCNC: 27 MMOL/L (ref 21–32)
COLOR UR: YELLOW
CREAT SERPL-MCNC: 0.59 MG/DL (ref 0.6–1.3)
EPITH CASTS URNS QL MICRO: ABNORMAL /LPF (ref 0–5)
GLOBULIN SER CALC-MCNC: 3.2 G/DL (ref 2–4)
GLUCOSE SERPL-MCNC: 94 MG/DL (ref 74–99)
GLUCOSE UR STRIP.AUTO-MCNC: NEGATIVE MG/DL
HDLC SERPL-MCNC: 64 MG/DL (ref 40–60)
HDLC SERPL: 3.4 {RATIO} (ref 0–5)
HGB UR QL STRIP: NEGATIVE
KETONES UR QL STRIP.AUTO: NEGATIVE MG/DL
LDLC SERPL CALC-MCNC: 133.8 MG/DL (ref 0–100)
LEUKOCYTE ESTERASE UR QL STRIP.AUTO: ABNORMAL
LIPID PROFILE,FLP: ABNORMAL
NITRITE UR QL STRIP.AUTO: NEGATIVE
PH UR STRIP: 5.5 [PH] (ref 5–8)
POTASSIUM SERPL-SCNC: 3.7 MMOL/L (ref 3.5–5.5)
PROT SERPL-MCNC: 7.3 G/DL (ref 6.4–8.2)
PROT UR STRIP-MCNC: NEGATIVE MG/DL
RBC #/AREA URNS HPF: NEGATIVE /HPF (ref 0–5)
SODIUM SERPL-SCNC: 141 MMOL/L (ref 136–145)
SP GR UR REFRACTOMETRY: 1 (ref 1–1.03)
T4 FREE SERPL-MCNC: 0.9 NG/DL (ref 0.7–1.5)
TRIGL SERPL-MCNC: 111 MG/DL (ref ?–150)
TSH SERPL DL<=0.05 MIU/L-ACNC: 1.26 UIU/ML (ref 0.36–3.74)
UROBILINOGEN UR QL STRIP.AUTO: 0.2 EU/DL (ref 0.2–1)
VLDLC SERPL CALC-MCNC: 22.2 MG/DL
WBC URNS QL MICRO: ABNORMAL /HPF (ref 0–4)

## 2022-11-18 PROCEDURE — G8417 CALC BMI ABV UP PARAM F/U: HCPCS | Performed by: NURSE PRACTITIONER

## 2022-11-18 PROCEDURE — 99497 ADVNCD CARE PLAN 30 MIN: CPT | Performed by: NURSE PRACTITIONER

## 2022-11-18 PROCEDURE — G8427 DOCREV CUR MEDS BY ELIG CLIN: HCPCS | Performed by: NURSE PRACTITIONER

## 2022-11-18 PROCEDURE — 81001 URINALYSIS AUTO W/SCOPE: CPT

## 2022-11-18 PROCEDURE — G9717 DOC PT DX DEP/BP F/U NT REQ: HCPCS | Performed by: NURSE PRACTITIONER

## 2022-11-18 PROCEDURE — G8400 PT W/DXA NO RESULTS DOC: HCPCS | Performed by: NURSE PRACTITIONER

## 2022-11-18 PROCEDURE — 36415 COLL VENOUS BLD VENIPUNCTURE: CPT

## 2022-11-18 PROCEDURE — G9899 SCRN MAM PERF RSLTS DOC: HCPCS | Performed by: NURSE PRACTITIONER

## 2022-11-18 PROCEDURE — 1090F PRES/ABSN URINE INCON ASSESS: CPT | Performed by: NURSE PRACTITIONER

## 2022-11-18 PROCEDURE — G0008 ADMIN INFLUENZA VIRUS VAC: HCPCS | Performed by: NURSE PRACTITIONER

## 2022-11-18 PROCEDURE — 80053 COMPREHEN METABOLIC PANEL: CPT

## 2022-11-18 PROCEDURE — 90694 VACC AIIV4 NO PRSRV 0.5ML IM: CPT | Performed by: NURSE PRACTITIONER

## 2022-11-18 PROCEDURE — 80061 LIPID PANEL: CPT

## 2022-11-18 PROCEDURE — 99214 OFFICE O/P EST MOD 30 MIN: CPT | Performed by: NURSE PRACTITIONER

## 2022-11-18 PROCEDURE — G0439 PPPS, SUBSEQ VISIT: HCPCS | Performed by: NURSE PRACTITIONER

## 2022-11-18 PROCEDURE — 1101F PT FALLS ASSESS-DOCD LE1/YR: CPT | Performed by: NURSE PRACTITIONER

## 2022-11-18 PROCEDURE — 3017F COLORECTAL CA SCREEN DOC REV: CPT | Performed by: NURSE PRACTITIONER

## 2022-11-18 PROCEDURE — 84443 ASSAY THYROID STIM HORMONE: CPT

## 2022-11-18 PROCEDURE — 84439 ASSAY OF FREE THYROXINE: CPT

## 2022-11-18 PROCEDURE — G8536 NO DOC ELDER MAL SCRN: HCPCS | Performed by: NURSE PRACTITIONER

## 2022-11-18 RX ORDER — DIPHENHYDRAMINE HCL 25 MG
25 CAPSULE ORAL
Status: CANCELLED | OUTPATIENT
Start: 2022-11-18

## 2022-11-18 RX ORDER — ACETAMINOPHEN 500 MG/.95G
POWDER ORAL
Status: CANCELLED | OUTPATIENT
Start: 2022-11-18

## 2022-11-18 NOTE — ACP (ADVANCE CARE PLANNING)
Advance Care Planning     General Advance Care Planning (ACP) Conversation      Date of Conversation: 11/18/2022  Conducted with: Patient with Decision Making Capacity    Healthcare Decision Maker:     Primary Decision Maker: [address] [place] [phone]  Click here to complete [address] including selection of the [address] Relationship (ie \"Primary\")  Today we documented Decision Maker(s) consistent with Legal Next of Kin hierarchy.     Content/Action Overview:   Has NO ACP documents/care preferences - requested patient complete ACP documents  Reviewed DNR/DNI and patient elects Full Code (Attempt Resuscitation)  Topics discussed: treatment goals, ventilation preferences, and resuscitation preferences  Additional Comments : yes CPR and her  will make decisions about life support       Length of Voluntary ACP Conversation in minutes:  30 minutes    Tish Eldridge NP

## 2022-11-18 NOTE — PROGRESS NOTES
Dayo Ferrer is a 78 y.o. presents today for No chief complaint on file. Is someone accompanying this pt? No    Is the patient using any DME equipment during OV? No    There were no vitals taken for this visit. Depression Screening:   3 most recent PHQ Screens 11/18/2022   Little interest or pleasure in doing things Not at all   Feeling down, depressed, irritable, or hopeless Not at all   Total Score PHQ 2 0       Health Maintenance: reviewed and discussed and ordered per Provider. Health Maintenance Due   Topic Date Due    Hepatitis C Screening  Never done    COVID-19 Vaccine (1) Never done    Depression Monitoring  Never done    DTaP/Tdap/Td series (1 - Tdap) Never done    Shingrix Vaccine Age 50> (1 of 2) Never done    Bone Densitometry (Dexa) Screening  Never done    Medicare Yearly Exam  Never done    Flu Vaccine (1) 08/01/2022         Coordination of Care:   1. \"Have you been to the ER, urgent care clinic since your last visit? Hospitalized since your last visit? \" No    2. \"Have you seen or consulted any other health care providers outside of the [address] [place] [address] since your last visit? \" No     3. For patients aged 39-70: Has the patient had a colonoscopy / FIT/ Cologuard? Yes - no Care Gap present    If the patient is female:    4. For patients aged 41-77: Has the patient had a mammogram within the past 2 years? Yes - no Care Gap present    5. For patients aged 21-65: Has the patient had a pap smear? NA - based on age or sex     Advanced Directive:  1. Do you have an Advanced Directive? Yes     2. Would you like information on Advanced Directives?  No    Arabella Araujo CMA

## 2022-11-18 NOTE — PROGRESS NOTES
Haritha Snáchez is a 78 y.o. female  established patient, here for evaluation of the following chief complaint(s):  Chief Complaint   Patient presents with    Annual Wellness Visit      Assessment and Plan  1. Advanced directives, counseling/discussion  -     FULL CODE  2. Needs flu shot  -     INFLUENZA, FLUAD, (AGE 65 Y+), IM, PF, 0.5 ML  3. Post-menopausal  -     DEXA BONE DENSITY STUDY AXIAL; Future  4. Hypokalemia  -     METABOLIC PANEL, COMPREHENSIVE; Future  5. Preventative health care  -     METABOLIC PANEL, COMPREHENSIVE; Future  -     URINALYSIS W/ RFLX MICROSCOPIC; Future  6. Screening for thyroid disorder  -     T4, FREE; Future  -     TSH 3RD GENERATION; Future  7. Screening for hyperlipidemia  -     LIPID PANEL; Future  8. Encounter for immunization  9. Abnormal hearing screen  -     REFERRAL TO AUDIOLOGY  10. Medicare annual wellness visit, subsequent  11. Full code status  12. History of kidney stones     Follow-up and Dispositions    Return in about 3 months (around 2/18/2023) for routine, 15. HPI:   In office visit. Patient appears well. She has seen urology for a large kidney stone and had surgery and a stent placed. Patient admitted she has trouble hearing in certain frequencies and had an abnormal hearing screening in our office. ROS:    General: negative for - chills, fever, weight changes or malaise  HEENT: no sore throat, nasal congestion, vision problems or ear problems  Respiratory: no cough, shortness of breath, or wheezing  Cardiovascular: no chest pain, palpitations, or dyspnea on exertion  Gastrointestinal: no abdominal pain, N/V, change in bowel habits  Musculoskeletal: no back pain or joint pain  Neurological: no headache or dizziness  Endo:  No polyuria or polydipsia  : no urinary  Psychological: negative for - anxiety, depression, sleeps issues    Prior to Admission medications    Medication Sig Start Date End Date Taking?  Authorizing Provider   acetaminophen (Tylenol Extra Strength) 500 mg pwpk Take  by mouth. Yes Provider, Historical   diphenhydrAMINE (BENADRYL) 25 mg capsule Take 25 mg by mouth nightly as needed. Yes Provider, Historical   oxybutynin (DITROPAN) 5 mg tablet Take 1 Tablet by mouth three (3) times daily. Indications: Ureteral stent pain 5/24/22   Jono Arriola MD        Physical Exam  Patient appears well, she is pleasant, alert, oriented x 3, in no distress. ENT normal.  Neck supple. No adenopathy or thyromegaly. TAMARA. Lungs are clear, good air entry, no wheezes  Cardiovascular, S1 and S2 normal, no murmurs, regular rate and rhythm. Chest wall negative for tenderness  Abdomen is soft without tenderness, guarding  /Anorectal, deferred. Muscleskeletal, no swelling, no tenderness, no injury. Extremities show no edema  Neurological is normal without focal findings. Skin: no concerning lesions. Psych: normal affect. Mood good. Oriented x 3. Hearing Screening    500Hz 1000Hz 2000Hz 4000Hz   Right ear Fail Pass Pass Fail   Left ear Fail Pass Pass Fail     Vision Screening    Right eye Left eye Both eyes   Without correction      With correction 20/20 20/20 20/20        Vitals:    11/18/22 1033   BP: 130/75   Pulse: 60   Resp: 16   Temp: 97.3 °F (36.3 °C)   TempSrc: Temporal   SpO2: 98%   Weight: 195 lb 6.4 oz (88.6 kg)   Height: 5' 7\" (1.702 m)   PainSc:   0 - No pain       *Plan of care reviewed with patient. Patient in agreement with plan and expresses understanding. All questions answered and patient encouraged to call or RTO if further questions or concerns. On this date 11/18/2022 I have spent 39 minutes reviewing previous notes, test results and face to face with the patient discussing the diagnosis and importance of compliance with the treatment plan as well as documenting on the day of the visit.       MAYRA Rae                This is the Subsequent Medicare Annual Wellness Exam, performed 12 months or more after the Initial AWV or the last Subsequent AWV    I have reviewed the patient's medical history in detail and updated the computerized patient record. Assessment/Plan   Education and counseling provided:  Are appropriate based on today's review and evaluation  End-of-Life planning (with patient's consent)  Influenza Vaccine  Bone mass measurement (DEXA)    1. Advanced directives, counseling/discussion  -     FULL CODE  2. Needs flu shot  -     INFLUENZA, FLUAD, (AGE 65 Y+), IM, PF, 0.5 ML  3. Post-menopausal  -     DEXA BONE DENSITY STUDY AXIAL; Future  4. Hypokalemia  -     METABOLIC PANEL, COMPREHENSIVE; Future  5. Preventative health care  -     METABOLIC PANEL, COMPREHENSIVE; Future  -     URINALYSIS W/ RFLX MICROSCOPIC; Future  6. Screening for thyroid disorder  -     T4, FREE; Future  -     TSH 3RD GENERATION; Future  7. Screening for hyperlipidemia  -     LIPID PANEL; Future  8. Encounter for immunization  9. Abnormal hearing screen  -     REFERRAL TO AUDIOLOGY  10. Medicare annual wellness visit, subsequent  11. Full code status  12. History of kidney stones       Depression Risk Factor Screening     3 most recent PHQ Screens 11/18/2022   Little interest or pleasure in doing things Not at all   Feeling down, depressed, irritable, or hopeless Not at all   Total Score PHQ 2 0       Alcohol & Drug Abuse Risk Screen   Do you average more than 1 drink per night or more than 7 drinks a week?: (P) No  On any one occasion in the past three months have you had more than 3 drinks containing alcohol?: (P) No          Functional Ability and Level of Safety   Hearing:  Hearing: (P) additional comments below  Hearing comments: (P) I hear pretty well but have trouble hearing conversation with background noise    Activities of Daily Living:   The home contains: (P) handrails, rugs  Functional ADLs: (P) Patient does total self care   Ambulation:  Patient ambulates: (P) with mild difficulty  How far the patient can walk with difficulty: (P) 2 miles  Walking is difficult due to: (P) additional comments below  Additional comments about walking difficulties: (P) If i dont look down i can trip   Fall Risk:  Fall Risk Assessment, last 12 mths 11/18/2022   Able to walk? Yes   Fall in past 12 months? 1   Do you feel unsteady? 1   Are you worried about falling 1   Is the gait abnormal? 0   Number of falls in past 12 months 2   Fall with injury?  0     Abuse Screen:  Do you ever feel afraid of your partner?: (P) No  Are you in a relationship with someone who physically or mentally threatens you?: (P) No  Is it safe for you to go home?: (P) Yes      Cognitive Screening   Has your family/caregiver stated any concerns about your memory?: (P) No   Cognitive Screening: no issues     Health Maintenance Due     Health Maintenance Due   Topic Date Due    Hepatitis C Screening  Never done    COVID-19 Vaccine (1) Never done    DTaP/Tdap/Td series (1 - Tdap) Never done    Shingrix Vaccine Age 50> (1 of 2) Never done    Bone Densitometry (Dexa) Screening  Never done       Patient Care Team   Patient Care Team:  Konrad Miller NP as PCP - General (Nurse Practitioner)  Malcolm York MD as PCP - Svitlana 1, Jess Pagan NP as PCP - Jodi Estrada Provider    History     Patient Active Problem List   Diagnosis Code    Pneumonia due to COVID-19 virus U07.1, J12.82    Acute hypoxemic respiratory failure due to COVID-19 (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 Josie Nelson UROLOGICAL  2022    Cystoscopy, Right Ureteroscopy, MINI RIGHT PERCUTANIOUS NEPHROLITHOTOMY Greater than 2cm, Right Nephrostogram, Right Retrograde Ureteral Stent Placement ; Dr. Leonard Whittaker     Current Outpatient Medications   Medication Sig Dispense Refill    acetaminophen (Tylenol Extra Strength) 500 mg pwpk Take  by mouth. diphenhydrAMINE (BENADRYL) 25 mg capsule Take 25 mg by mouth nightly as needed. oxybutynin (DITROPAN) 5 mg tablet Take 1 Tablet by mouth three (3) times daily.  Indications: Ureteral stent pain 42 Tablet 0     Allergies   Allergen Reactions    Ibuprofen Other (comments)     Nose Bleeding        Family History   Problem Relation Age of Onset    Breast Cancer Sister 52     Social History     Tobacco Use    Smoking status: Former     Types: Cigarettes     Quit date: 1991     Years since quittin.4    Smokeless tobacco: Never   Substance Use Topics    Alcohol use: Never         Sayda Newby NP

## 2022-11-18 NOTE — PATIENT INSTRUCTIONS
Medicare Wellness Visit, Female     The best way to live healthy is to have a lifestyle where you eat a well-balanced diet, exercise regularly, limit alcohol use, and quit all forms of tobacco/nicotine, if applicable. Regular preventive services are another way to keep healthy. Preventive services (vaccines, screening tests, monitoring & exams) can help personalize your care plan, which helps you manage your own care. Screening tests can find health problems at the earliest stages, when they are easiest to treat. [facility] follows the current, evidence-based guidelines published by the [facility] Shabbir Garcia ([facility]) when recommending preventive services for our patients. Because we follow these guidelines, sometimes recommendations change over time as research supports it. (For example, mammograms used to be recommended annually. Even though Medicare will still pay for an annual mammogram, the newer guidelines recommend a mammogram every two years for women of average risk). Of course, you and your doctor may decide to screen more often for some diseases, based on your risk and your co-morbidities (chronic disease you are already diagnosed with). Preventive services for you include:  - Medicare offers their members a free annual wellness visit, which is time for you and your primary care provider to discuss and plan for your preventive service needs. Take advantage of this benefit every year!  -All adults over the age of 72 should receive the recommended pneumonia vaccines. Current USPSTF guidelines recommend a series of two vaccines for the best pneumonia protection.   -All adults should have a flu vaccine yearly and a tetanus vaccine every 10 years.   -All adults age 48 and older should receive the shingles vaccines (series of two vaccines).       -All adults age 38-68 who are overweight should have a diabetes screening test once every three years.   -All adults born between [birth year] and 1964 should be screened once for Hepatitis C.  -Other screening tests and preventive services for persons with diabetes include: an eye exam to screen for diabetic retinopathy, a kidney function test, a foot exam, and stricter control over your cholesterol.   -Cardiovascular screening for adults with routine risk involves an electrocardiogram (ECG) at intervals determined by your doctor.   -Colorectal cancer screenings should be done for adults age 54-65 with no increased risk factors for colorectal cancer. There are a number of acceptable methods of screening for this type of cancer. Each test has its own benefits and drawbacks. Discuss with your doctor what is most appropriate for you during your annual wellness visit. The different tests include: colonoscopy (considered the best screening method), a fecal occult blood test, a fecal DNA test, and sigmoidoscopy.    -A bone mass density test is recommended when a woman turns 65 to screen for osteoporosis. This test is only recommended one time, as a screening. Some providers will use this same test as a disease monitoring tool if you already have osteoporosis. -Breast cancer screenings are recommended every other year for women of normal risk, age 54-69.  -Cervical cancer screenings for women over age 72 are only recommended with certain risk factors.      Here is a list of your current Health Maintenance items (your personalized list of preventive services) with a due date:  Health Maintenance Due   Topic Date Due    Hepatitis C Test  Never done    COVID-19 Vaccine (1) Never done    Depression Monitoring  Never done    DTaP/Tdap/Td  (1 - Tdap) Never done    Shingles Vaccine (1 of 2) Never done    Bone Mineral Density   Never done    Annual Well Visit  Never done    Yearly Flu Vaccine (1) 08/01/2022

## 2023-01-04 NOTE — PROGRESS NOTES
Your total cholesterol and LDL were elevated. Your HDL/good cholesterol is heart protective. Your metabolic panel showed you may have been dehydrated. TSH and T4 within normal limits. You have bacteria in your urine but we do not treat this unless you are having symptoms.

## 2023-01-13 ENCOUNTER — TRANSCRIBE ORDER (OUTPATIENT)
Dept: SCHEDULING | Age: 68
End: 2023-01-13

## 2023-01-13 DIAGNOSIS — Z12.31 VISIT FOR SCREENING MAMMOGRAM: Primary | ICD-10-CM

## 2023-01-16 ENCOUNTER — HOSPITAL ENCOUNTER (OUTPATIENT)
Dept: BONE DENSITY | Age: 68
Discharge: HOME | End: 2023-01-16
Attending: NURSE PRACTITIONER
Payer: MEDICARE

## 2023-01-16 DIAGNOSIS — Z78.0 POST-MENOPAUSAL: ICD-10-CM

## 2023-01-16 PROCEDURE — 77080 DXA BONE DENSITY AXIAL: CPT

## 2023-01-19 ENCOUNTER — HOSPITAL ENCOUNTER (OUTPATIENT)
Dept: WOMENS IMAGING | Age: 68
Discharge: HOME | End: 2023-01-19
Attending: NURSE PRACTITIONER
Payer: MEDICARE

## 2023-01-19 DIAGNOSIS — Z12.31 VISIT FOR SCREENING MAMMOGRAM: ICD-10-CM

## 2023-01-19 PROCEDURE — 77067 SCR MAMMO BI INCL CAD: CPT

## 2023-02-10 ENCOUNTER — TELEPHONE (OUTPATIENT)
Dept: FAMILY MEDICINE CLINIC | Age: 68
End: 2023-02-10

## 2023-02-10 NOTE — TELEPHONE ENCOUNTER
----- Message from Yudy Mccabe NP sent at 1/24/2023  7:59 AM EST -----  Your recent bone density study showed osteopenia. Walking and light weights if you are able to get help with bone health. You can buy a calcium supplement over-the-counter like Citracal and take as directed. We can follow-up virtually related to your imaging and talk more about your bone health.

## 2023-02-16 ENCOUNTER — TELEMEDICINE (OUTPATIENT)
Facility: CLINIC | Age: 68
End: 2023-02-16

## 2023-02-16 DIAGNOSIS — M85.80 OSTEOPENIA, UNSPECIFIED LOCATION: ICD-10-CM

## 2023-02-16 DIAGNOSIS — Z00.00 PREVENTATIVE HEALTH CARE: Primary | ICD-10-CM

## 2023-02-16 RX ORDER — MULTIVIT-MIN/IRON/FOLIC ACID/K 18-600-40
2000 CAPSULE ORAL DAILY
Qty: 30 CAPSULE | Refills: 2
Start: 2023-02-16

## 2023-02-16 RX ORDER — PRENATAL 168/IRON/FOLIC/OMEGA3 27-800-235
2 CAPSULE ORAL 2 TIMES DAILY
Qty: 90 TABLET | Refills: 1
Start: 2023-02-16

## 2023-02-16 ASSESSMENT — PATIENT HEALTH QUESTIONNAIRE - PHQ9
SUM OF ALL RESPONSES TO PHQ QUESTIONS 1-9: 0
SUM OF ALL RESPONSES TO PHQ9 QUESTIONS 1 & 2: 0
2. FEELING DOWN, DEPRESSED OR HOPELESS: 0
1. LITTLE INTEREST OR PLEASURE IN DOING THINGS: 0
SUM OF ALL RESPONSES TO PHQ QUESTIONS 1-9: 0

## 2023-02-16 NOTE — PROGRESS NOTES
Noman Richard is a 80 y.o. female  established patient, here for evaluation of the following chief complaint(s):  No chief complaint on file. Noman Richard, was evaluated through a synchronous (real-time) audio-video encounter. The patient (or guardian if applicable) is aware that this is a billable service, which includes applicable co-pays. This Virtual Visit was conducted with patient's (and/or legal guardian's) consent. The visit was conducted pursuant to the emergency declaration under the [facility], [facility] authority and the [employer] General Act. Patient identification was verified, and a caregiver was present when appropriate. The patient was located at Facility (Appt Department): [id number] [facility], [address] [place] [address],  [facility]  Provider was located at Home ([place] [address]): [place]    Total time spent for this encounter:  28 minutes     --HEIDI Schmitt CNP on 2/16/2023 at 9:12 AM    An electronic signature was used to authenticate this note. Assessment and Plan  1. Preventative health care  -     Cholecalciferol (VITAMIN D) 50 MCG (2000 UT) CAPS capsule; Take 2,000 Units by mouth daily, Disp-30 capsule, R-2NO PRINT  -     Calcium-Phosphorus-Vitamin D (CITRACAL +D3) 250-107-500 MG-MG-UNIT CHEW; Take 2 tablets by mouth in the morning and at bedtime, Disp-90 tablet, R-1NO PRINT  2. Osteopenia, unspecified location  -     Cholecalciferol (VITAMIN D) 50 MCG (2000 UT) CAPS capsule; Take 2,000 Units by mouth daily, Disp-30 capsule, R-2NO PRINT  -     Calcium-Phosphorus-Vitamin D (CITRACAL +D3) 250-107-500 MG-MG-UNIT CHEW; Take 2 tablets by mouth in the morning and at bedtime, Disp-90 tablet, R-1NO PRINT     Return in about 6 months (around 8/16/2023) for Routine, 20. HPI:   Virtual visit. Your recent bone density study showed osteopenia.   Walking and light weights if you are able to get help with bone health. You can buy a calcium supplement over-the-counter like Citracal and take as directed. We can follow-up virtually related to your imaging and talk more about your bone health. She has seen audiology related to decreased hearing loss. She will need hearing aids for concern constants that would be difficult hear and could increase dementia related her brain harder. She and  are considering hearing aids. ROS:    General: negative for - chills, fever, weight changes or malaise  HEENT: no sore throat, nasal congestion, vision problems or ear problems  Respiratory: no cough, shortness of breath, or wheezing  Cardiovascular: no chest pain, palpitations, or dyspnea on exertion  Gastrointestinal: no abdominal pain, N/V, change in bowel habits  Musculoskeletal: no back pain or joint pain  Neurological: no headache or dizziness  Endo:  No polyuria or polydipsia  : no urinary  Skin:   Psychological: negative for - anxiety, depression, sleeps issues    Prior to Admission medications    Medication Sig Start Date End Date Taking? Authorizing Provider   Cholecalciferol (VITAMIN D) 50 MCG (2000 UT) CAPS capsule Take 2,000 Units by mouth daily 2/16/23  Yes RefugHEIDI Dumont CNP   Calcium-Phosphorus-Vitamin D (CITRACAL +D3) 250-107-500 MG-MG-UNIT CHEW Take 2 tablets by mouth in the morning and at bedtime 2/16/23  Yes HEIDI Hernandez CNP   Acetaminophen (TYLENOL DISSOLVE PACKS) 500 MG PACK Take by mouth    Ar Automatic Reconciliation   diphenhydrAMINE (BENADRYL) 25 MG capsule Take 25 mg by mouth    Ar Automatic Reconciliation        No results found for this visit on 02/16/23. Physical Exam  The patient appears well, is pleasant, alert, oriented x 3, in no distress. Eyes, no discharge. Normal nose. Head, normocephalic and atraumatic. Nose appears normal.  Neck, no visible neck mass.    Lungs, appears to have normal respiratory effort     Skin, no jaundice and patient does not appear pale.  Psych: normal affect. Mood good. Oriented x 3. Judgement and insight intact. There were no vitals filed for this visit. *Plan of care reviewed with patient. Patient in agreement with plan and expresses understanding. All questions answered and patient encouraged to call or RTO if further questions or concerns.      28 minutes      Katia Bowers NP-C

## 2023-10-04 ENCOUNTER — TELEPHONE (OUTPATIENT)
Facility: CLINIC | Age: 68
End: 2023-10-04

## 2023-10-04 NOTE — TELEPHONE ENCOUNTER
Pt did go to urgent care for urinary issues because it got pretty intense. They put her on amoxicillin and difliptine for possible yeast infection. Do you still want to see her???  Please let pt know. Thank you.

## 2023-10-18 ENCOUNTER — OFFICE VISIT (OUTPATIENT)
Facility: CLINIC | Age: 68
End: 2023-10-18
Payer: MEDICARE

## 2023-10-18 VITALS
WEIGHT: 192 LBS | DIASTOLIC BLOOD PRESSURE: 57 MMHG | SYSTOLIC BLOOD PRESSURE: 112 MMHG | TEMPERATURE: 97.3 F | RESPIRATION RATE: 18 BRPM | HEART RATE: 68 BPM | HEIGHT: 67 IN | BODY MASS INDEX: 30.13 KG/M2 | OXYGEN SATURATION: 97 %

## 2023-10-18 DIAGNOSIS — N30.01 ACUTE CYSTITIS WITH HEMATURIA: Primary | ICD-10-CM

## 2023-10-18 LAB
BILIRUBIN, URINE, POC: NEGATIVE
BLOOD URINE, POC: NORMAL
GLUCOSE URINE, POC: NEGATIVE
KETONES, URINE, POC: NEGATIVE
LEUKOCYTE ESTERASE, URINE, POC: NORMAL
NITRITE, URINE, POC: POSITIVE
PH, URINE, POC: 7 (ref 4.6–8)
PROTEIN,URINE, POC: NORMAL
SPECIFIC GRAVITY, URINE, POC: 1.02 (ref 1–1.03)
URINALYSIS CLARITY, POC: NORMAL
URINALYSIS COLOR, POC: YELLOW
UROBILINOGEN, POC: NORMAL

## 2023-10-18 PROCEDURE — 1090F PRES/ABSN URINE INCON ASSESS: CPT

## 2023-10-18 PROCEDURE — 99204 OFFICE O/P NEW MOD 45 MIN: CPT

## 2023-10-18 PROCEDURE — 3017F COLORECTAL CA SCREEN DOC REV: CPT

## 2023-10-18 PROCEDURE — 4004F PT TOBACCO SCREEN RCVD TLK: CPT

## 2023-10-18 PROCEDURE — G8399 PT W/DXA RESULTS DOCUMENT: HCPCS

## 2023-10-18 PROCEDURE — 1123F ACP DISCUSS/DSCN MKR DOCD: CPT

## 2023-10-18 PROCEDURE — 81003 URINALYSIS AUTO W/O SCOPE: CPT

## 2023-10-18 PROCEDURE — G8427 DOCREV CUR MEDS BY ELIG CLIN: HCPCS

## 2023-10-18 PROCEDURE — G8484 FLU IMMUNIZE NO ADMIN: HCPCS

## 2023-10-18 PROCEDURE — G8417 CALC BMI ABV UP PARAM F/U: HCPCS

## 2023-10-18 RX ORDER — CIPROFLOXACIN 500 MG/1
500 TABLET, FILM COATED ORAL 2 TIMES DAILY
Qty: 14 TABLET | Refills: 0 | Status: SHIPPED | OUTPATIENT
Start: 2023-10-18 | End: 2023-10-25

## 2023-10-18 SDOH — ECONOMIC STABILITY: INCOME INSECURITY: HOW HARD IS IT FOR YOU TO PAY FOR THE VERY BASICS LIKE FOOD, HOUSING, MEDICAL CARE, AND HEATING?: NOT HARD AT ALL

## 2023-10-18 SDOH — ECONOMIC STABILITY: FOOD INSECURITY: WITHIN THE PAST 12 MONTHS, YOU WORRIED THAT YOUR FOOD WOULD RUN OUT BEFORE YOU GOT MONEY TO BUY MORE.: NEVER TRUE

## 2023-10-18 SDOH — ECONOMIC STABILITY: HOUSING INSECURITY
IN THE LAST 12 MONTHS, WAS THERE A TIME WHEN YOU DID NOT HAVE A STEADY PLACE TO SLEEP OR SLEPT IN A SHELTER (INCLUDING NOW)?: NO

## 2023-10-18 SDOH — ECONOMIC STABILITY: FOOD INSECURITY: WITHIN THE PAST 12 MONTHS, THE FOOD YOU BOUGHT JUST DIDN'T LAST AND YOU DIDN'T HAVE MONEY TO GET MORE.: NEVER TRUE

## 2023-10-18 ASSESSMENT — PATIENT HEALTH QUESTIONNAIRE - PHQ9
8. MOVING OR SPEAKING SO SLOWLY THAT OTHER PEOPLE COULD HAVE NOTICED. OR THE OPPOSITE, BEING SO FIGETY OR RESTLESS THAT YOU HAVE BEEN MOVING AROUND A LOT MORE THAN USUAL: 0
2. FEELING DOWN, DEPRESSED OR HOPELESS: 0
1. LITTLE INTEREST OR PLEASURE IN DOING THINGS: 0
SUM OF ALL RESPONSES TO PHQ9 QUESTIONS 1 & 2: 0
10. IF YOU CHECKED OFF ANY PROBLEMS, HOW DIFFICULT HAVE THESE PROBLEMS MADE IT FOR YOU TO DO YOUR WORK, TAKE CARE OF THINGS AT HOME, OR GET ALONG WITH OTHER PEOPLE: 0
9. THOUGHTS THAT YOU WOULD BE BETTER OFF DEAD, OR OF HURTING YOURSELF: 0
SUM OF ALL RESPONSES TO PHQ QUESTIONS 1-9: 3
7. TROUBLE CONCENTRATING ON THINGS, SUCH AS READING THE NEWSPAPER OR WATCHING TELEVISION: 0
3. TROUBLE FALLING OR STAYING ASLEEP: 2
4. FEELING TIRED OR HAVING LITTLE ENERGY: 1
SUM OF ALL RESPONSES TO PHQ QUESTIONS 1-9: 3
6. FEELING BAD ABOUT YOURSELF - OR THAT YOU ARE A FAILURE OR HAVE LET YOURSELF OR YOUR FAMILY DOWN: 0
5. POOR APPETITE OR OVEREATING: 0

## 2023-10-18 ASSESSMENT — ANXIETY QUESTIONNAIRES
2. NOT BEING ABLE TO STOP OR CONTROL WORRYING: 0
6. BECOMING EASILY ANNOYED OR IRRITABLE: 0
5. BEING SO RESTLESS THAT IT IS HARD TO SIT STILL: 0
3. WORRYING TOO MUCH ABOUT DIFFERENT THINGS: 0
4. TROUBLE RELAXING: 0
1. FEELING NERVOUS, ANXIOUS, OR ON EDGE: 0
IF YOU CHECKED OFF ANY PROBLEMS ON THIS QUESTIONNAIRE, HOW DIFFICULT HAVE THESE PROBLEMS MADE IT FOR YOU TO DO YOUR WORK, TAKE CARE OF THINGS AT HOME, OR GET ALONG WITH OTHER PEOPLE: NOT DIFFICULT AT ALL
GAD7 TOTAL SCORE: 0
7. FEELING AFRAID AS IF SOMETHING AWFUL MIGHT HAPPEN: 0

## 2023-10-18 NOTE — PROGRESS NOTES
Donald Banegas is a 80y.o. year old female who presents in office today for   Chief Complaint   Patient presents with    Follow-Up from [address] [place] [address] Infection       Is someone accompanying this pt? NO    Is the patient using any DME equipment during OV? NO    Depression Screening:       10/18/2023     3:14 PM 2/16/2023     9:12 AM 11/18/2022    10:30 AM 11/18/2022     9:44 AM   PHQ-9 Questionaire   Little interest or pleasure in doing things 0 0 0 0   Feeling down, depressed, or hopeless 0 0 0 0   Trouble falling or staying asleep, or sleeping too much 2      Feeling tired or having little energy 1      Poor appetite or overeating 0      Feeling bad about yourself - or that you are a failure or have let yourself or your family down 0      Trouble concentrating on things, such as reading the newspaper or watching television 0      Moving or speaking so slowly that other people could have noticed. Or the opposite - being so fidgety or restless that you have been moving around a lot more than usual 0      Thoughts that you would be better off dead, or of hurting yourself in some way 0      PHQ-9 Total Score 3 0 0 0   If you checked off any problems, how difficult have these problems made it for you to do your work, take care of things at home, or get along with other people? 0          Abuse Screening:      10/18/2023     3:00 PM   AMB Abuse Screening   Do you ever feel afraid of your partner? N   Are you in a relationship with someone who physically or mentally threatens you? N   Is it safe for you to go home? Y       Learning Assessment:  No question data found. Fall Risk:      10/18/2023     3:10 PM   Fall Risk   2 or more falls in past year? no   Fall with injury in past year? no           Coordination of Care:   1. \"Have you been to the ER, urgent care clinic since your last visit? Hospitalized since your last visit? \" YES    2.  \"Have you seen or consulted any other health care providers outside of

## 2023-10-26 ASSESSMENT — ENCOUNTER SYMPTOMS
VOMITING: 0
NAUSEA: 0
CHEST TIGHTNESS: 0
DIARRHEA: 0
WHEEZING: 0
SHORTNESS OF BREATH: 0

## 2023-10-27 NOTE — PROGRESS NOTES
Patient ID: Char Saldana is a 80 y.o. female established patient presents for the following:      Subjective:     Urinary Tract Infection  Pertinent negatives include no hematuria. Char Saldana presents for ED follow-up with urinary tract infection. She was previously prescribed Macrobid. She reports continued symptoms of dysuria and urinary frequency. Denies any flank pain, no recent fevers, no confusion. Past Medical History:   Diagnosis Date    Abnormal LFTs     Eczema     History of atypical migraine     History of UTI     Nausea & vomiting        Past Surgical History:   Procedure Laterality Date    COLONOSCOPY      GYN Left     oophorectomy    OTHER SURGICAL HISTORY      toe Sx    TONSILLECTOMY      UROLOGICAL SURGERY  05/16/2022    CYSTOSCOPY, RIGHT STENT PLACEMENT; dr. Reid West  06/23/2022    Cystoscopy, Right Ureteroscopy, MINI RIGHT PERCUTANIOUS NEPHROLITHOTOMY Greater than 2cm, Right Nephrostogram, Right Retrograde Ureteral Stent Placement ; Dr. Rothman Rather      Cystoscopy       Current Outpatient Medications   Medication Sig Dispense Refill    Cholecalciferol (VITAMIN D) 50 MCG (2000 UT) CAPS capsule Take 2,000 Units by mouth daily 30 capsule 2    Calcium-Phosphorus-Vitamin D (CITRACAL +D3) 250-107-500 MG-MG-UNIT CHEW Take 2 tablets by mouth in the morning and at bedtime 90 tablet 1    Acetaminophen (TYLENOL DISSOLVE PACKS) 500 MG PACK Take by mouth      diphenhydrAMINE (BENADRYL) 25 MG capsule Take 1 capsule by mouth       No current facility-administered medications for this visit. ROS   Review of Systems   Constitutional:  Negative for chills, fatigue and fever. Respiratory:  Negative for chest tightness, shortness of breath and wheezing. Cardiovascular:  Negative for chest pain, palpitations and leg swelling. Gastrointestinal:  Negative for diarrhea, nausea and vomiting.    Genitourinary:  Positive for

## 2023-11-06 ENCOUNTER — HOSPITAL ENCOUNTER (OUTPATIENT)
Facility: HOSPITAL | Age: 68
Setting detail: SPECIMEN
Discharge: HOME | End: 2023-11-09
Payer: MEDICARE

## 2023-11-06 ENCOUNTER — OFFICE VISIT (OUTPATIENT)
Facility: CLINIC | Age: 68
End: 2023-11-06
Payer: MEDICARE

## 2023-11-06 VITALS
HEIGHT: 67 IN | DIASTOLIC BLOOD PRESSURE: 68 MMHG | HEART RATE: 70 BPM | TEMPERATURE: 98.4 F | SYSTOLIC BLOOD PRESSURE: 133 MMHG | RESPIRATION RATE: 16 BRPM | WEIGHT: 186.4 LBS | OXYGEN SATURATION: 96 % | BODY MASS INDEX: 29.26 KG/M2

## 2023-11-06 DIAGNOSIS — Z71.89 ACP (ADVANCE CARE PLANNING): ICD-10-CM

## 2023-11-06 DIAGNOSIS — Z13.1 SCREENING FOR DIABETES MELLITUS (DM): ICD-10-CM

## 2023-11-06 DIAGNOSIS — R73.09 ELEVATED RANDOM BLOOD GLUCOSE LEVEL: ICD-10-CM

## 2023-11-06 DIAGNOSIS — S99.921D INJURY OF RIGHT GREAT TOE, SUBSEQUENT ENCOUNTER: ICD-10-CM

## 2023-11-06 DIAGNOSIS — Z87.440 HISTORY OF UTI: ICD-10-CM

## 2023-11-06 DIAGNOSIS — Z13.220 SCREENING FOR CHOLESTEROL LEVEL: ICD-10-CM

## 2023-11-06 DIAGNOSIS — Z00.00 PREVENTATIVE HEALTH CARE: ICD-10-CM

## 2023-11-06 DIAGNOSIS — Z13.29 SCREENING FOR THYROID DISORDER: ICD-10-CM

## 2023-11-06 DIAGNOSIS — Z78.9 FULL CODE STATUS: ICD-10-CM

## 2023-11-06 DIAGNOSIS — Z00.00 MEDICARE ANNUAL WELLNESS VISIT, SUBSEQUENT: ICD-10-CM

## 2023-11-06 DIAGNOSIS — M79.674 GREAT TOE PAIN, RIGHT: ICD-10-CM

## 2023-11-06 DIAGNOSIS — Z23 IMMUNIZATION DUE: ICD-10-CM

## 2023-11-06 DIAGNOSIS — Z87.440 HISTORY OF UTI: Primary | ICD-10-CM

## 2023-11-06 LAB
ALBUMIN SERPL-MCNC: 4.3 G/DL (ref 3.4–5)
ALBUMIN/GLOB SERPL: 1.3 (ref 0.8–1.7)
ALP SERPL-CCNC: 191 U/L (ref 45–117)
ALT SERPL-CCNC: 44 U/L (ref 13–56)
ANION GAP SERPL CALC-SCNC: 7 MMOL/L (ref 3–18)
APPEARANCE UR: CLEAR
AST SERPL-CCNC: 29 U/L (ref 10–38)
BACTERIA URNS QL MICRO: NEGATIVE /HPF
BASOPHILS # BLD: 0 K/UL (ref 0–0.1)
BASOPHILS NFR BLD: 0 % (ref 0–2)
BILIRUB SERPL-MCNC: 0.5 MG/DL (ref 0.2–1)
BILIRUB UR QL: NEGATIVE
BUN SERPL-MCNC: 9 MG/DL (ref 7–18)
BUN/CREAT SERPL: 14 (ref 12–20)
CALCIUM SERPL-MCNC: 9.8 MG/DL (ref 8.5–10.1)
CHLORIDE SERPL-SCNC: 104 MMOL/L (ref 100–111)
CHOLEST SERPL-MCNC: 217 MG/DL
CO2 SERPL-SCNC: 27 MMOL/L (ref 21–32)
COLOR UR: YELLOW
CREAT SERPL-MCNC: 0.65 MG/DL (ref 0.6–1.3)
DIFFERENTIAL METHOD BLD: ABNORMAL
EOSINOPHIL # BLD: 0.1 K/UL (ref 0–0.4)
EOSINOPHIL NFR BLD: 1 % (ref 0–5)
EPITH CASTS URNS QL MICRO: ABNORMAL /LPF (ref 0–5)
ERYTHROCYTE [DISTWIDTH] IN BLOOD BY AUTOMATED COUNT: 12.9 % (ref 11.6–14.5)
EST. AVERAGE GLUCOSE BLD GHB EST-MCNC: 100 MG/DL
GLOBULIN SER CALC-MCNC: 3.4 G/DL (ref 2–4)
GLUCOSE SERPL-MCNC: 95 MG/DL (ref 74–99)
GLUCOSE UR STRIP.AUTO-MCNC: NEGATIVE MG/DL
HBA1C MFR BLD: 5.1 % (ref 4.2–5.6)
HCT VFR BLD AUTO: 39.7 % (ref 35–45)
HDLC SERPL-MCNC: 53 MG/DL (ref 40–60)
HDLC SERPL: 4.1 (ref 0–5)
HGB BLD-MCNC: 12 G/DL (ref 12–16)
HGB UR QL STRIP: NEGATIVE
IMM GRANULOCYTES # BLD AUTO: 0 K/UL (ref 0–0.04)
IMM GRANULOCYTES NFR BLD AUTO: 1 % (ref 0–0.5)
KETONES UR QL STRIP.AUTO: NEGATIVE MG/DL
LDLC SERPL CALC-MCNC: 137.2 MG/DL (ref 0–100)
LEUKOCYTE ESTERASE UR QL STRIP.AUTO: ABNORMAL
LIPID PANEL: ABNORMAL
LYMPHOCYTES # BLD: 1.8 K/UL (ref 0.9–3.6)
LYMPHOCYTES NFR BLD: 27 % (ref 21–52)
MCH RBC QN AUTO: 28.2 PG (ref 24–34)
MCHC RBC AUTO-ENTMCNC: 30.2 G/DL (ref 31–37)
MCV RBC AUTO: 93.2 FL (ref 78–100)
MONOCYTES # BLD: 0.6 K/UL (ref 0.05–1.2)
MONOCYTES NFR BLD: 8 % (ref 3–10)
NEUTS SEG # BLD: 4.3 K/UL (ref 1.8–8)
NEUTS SEG NFR BLD: 63 % (ref 40–73)
NITRITE UR QL STRIP.AUTO: NEGATIVE
NRBC # BLD: 0 K/UL (ref 0–0.01)
NRBC BLD-RTO: 0 PER 100 WBC
PH UR STRIP: 6.5 (ref 5–8)
PLATELET # BLD AUTO: 442 K/UL (ref 135–420)
PMV BLD AUTO: 9.4 FL (ref 9.2–11.8)
POTASSIUM SERPL-SCNC: 4.3 MMOL/L (ref 3.5–5.5)
PROT SERPL-MCNC: 7.7 G/DL (ref 6.4–8.2)
PROT UR STRIP-MCNC: NEGATIVE MG/DL
RBC # BLD AUTO: 4.26 M/UL (ref 4.2–5.3)
RBC #/AREA URNS HPF: NEGATIVE /HPF (ref 0–5)
SODIUM SERPL-SCNC: 138 MMOL/L (ref 136–145)
SP GR UR REFRACTOMETRY: 1 (ref 1–1.03)
T4 FREE SERPL-MCNC: 0.9 NG/DL (ref 0.7–1.5)
TRIGL SERPL-MCNC: 134 MG/DL
TSH SERPL DL<=0.05 MIU/L-ACNC: 0.92 UIU/ML (ref 0.36–3.74)
UROBILINOGEN UR QL STRIP.AUTO: 0.2 EU/DL (ref 0.2–1)
VLDLC SERPL CALC-MCNC: 26.8 MG/DL
WBC # BLD AUTO: 6.8 K/UL (ref 4.6–13.2)
WBC URNS QL MICRO: ABNORMAL /HPF (ref 0–4)

## 2023-11-06 PROCEDURE — 83036 HEMOGLOBIN GLYCOSYLATED A1C: CPT

## 2023-11-06 PROCEDURE — G8399 PT W/DXA RESULTS DOCUMENT: HCPCS | Performed by: NURSE PRACTITIONER

## 2023-11-06 PROCEDURE — G8484 FLU IMMUNIZE NO ADMIN: HCPCS | Performed by: NURSE PRACTITIONER

## 2023-11-06 PROCEDURE — 90694 VACC AIIV4 NO PRSRV 0.5ML IM: CPT | Performed by: NURSE PRACTITIONER

## 2023-11-06 PROCEDURE — G8427 DOCREV CUR MEDS BY ELIG CLIN: HCPCS | Performed by: NURSE PRACTITIONER

## 2023-11-06 PROCEDURE — 1123F ACP DISCUSS/DSCN MKR DOCD: CPT | Performed by: NURSE PRACTITIONER

## 2023-11-06 PROCEDURE — 1036F TOBACCO NON-USER: CPT | Performed by: NURSE PRACTITIONER

## 2023-11-06 PROCEDURE — 80061 LIPID PANEL: CPT

## 2023-11-06 PROCEDURE — 85025 COMPLETE CBC W/AUTO DIFF WBC: CPT

## 2023-11-06 PROCEDURE — 1090F PRES/ABSN URINE INCON ASSESS: CPT | Performed by: NURSE PRACTITIONER

## 2023-11-06 PROCEDURE — 3017F COLORECTAL CA SCREEN DOC REV: CPT | Performed by: NURSE PRACTITIONER

## 2023-11-06 PROCEDURE — G0009 ADMIN PNEUMOCOCCAL VACCINE: HCPCS | Performed by: NURSE PRACTITIONER

## 2023-11-06 PROCEDURE — G8417 CALC BMI ABV UP PARAM F/U: HCPCS | Performed by: NURSE PRACTITIONER

## 2023-11-06 PROCEDURE — 36415 COLL VENOUS BLD VENIPUNCTURE: CPT

## 2023-11-06 PROCEDURE — 81001 URINALYSIS AUTO W/SCOPE: CPT

## 2023-11-06 PROCEDURE — 99214 OFFICE O/P EST MOD 30 MIN: CPT | Performed by: NURSE PRACTITIONER

## 2023-11-06 PROCEDURE — 84439 ASSAY OF FREE THYROXINE: CPT

## 2023-11-06 PROCEDURE — 80053 COMPREHEN METABOLIC PANEL: CPT

## 2023-11-06 PROCEDURE — G0439 PPPS, SUBSEQ VISIT: HCPCS | Performed by: NURSE PRACTITIONER

## 2023-11-06 PROCEDURE — 90677 PCV20 VACCINE IM: CPT | Performed by: NURSE PRACTITIONER

## 2023-11-06 PROCEDURE — 84443 ASSAY THYROID STIM HORMONE: CPT

## 2023-11-06 PROCEDURE — G0008 ADMIN INFLUENZA VIRUS VAC: HCPCS | Performed by: NURSE PRACTITIONER

## 2023-11-06 ASSESSMENT — VISUAL ACUITY
OS_CC: 20/15
OD_CC: 20/20

## 2023-11-06 ASSESSMENT — PATIENT HEALTH QUESTIONNAIRE - PHQ9
SUM OF ALL RESPONSES TO PHQ QUESTIONS 1-9: 0
SUM OF ALL RESPONSES TO PHQ QUESTIONS 1-9: 0
1. LITTLE INTEREST OR PLEASURE IN DOING THINGS: 0
SUM OF ALL RESPONSES TO PHQ9 QUESTIONS 1 & 2: 0
SUM OF ALL RESPONSES TO PHQ QUESTIONS 1-9: 0
2. FEELING DOWN, DEPRESSED OR HOPELESS: 0
SUM OF ALL RESPONSES TO PHQ QUESTIONS 1-9: 0

## 2023-11-06 ASSESSMENT — LIFESTYLE VARIABLES
HOW OFTEN DO YOU HAVE A DRINK CONTAINING ALCOHOL: NEVER
HOW MANY STANDARD DRINKS CONTAINING ALCOHOL DO YOU HAVE ON A TYPICAL DAY: PATIENT DOES NOT DRINK

## 2023-11-06 NOTE — PROGRESS NOTES
Irving Barth is a 80 y.o. presents today for   Chief Complaint   Patient presents with    Medicare AWV    Nail Problem     Is someone accompanying this pt? no    Is the patient using any DME equipment during OV? no  Vitals:    23 0910   BP: 133/68   Pulse:    Resp:    Temp:    SpO2:        Depression Screenin/6/2023     9:02 AM 10/18/2023     3:14 PM 2023     9:12 AM 2022    10:30 AM 2022     9:44 AM   PHQ-9 Questionaire   Little interest or pleasure in doing things 0 0 0 0 0   Feeling down, depressed, or hopeless 0 0 0 0 0   Trouble falling or staying asleep, or sleeping too much  2      Feeling tired or having little energy  1      Poor appetite or overeating  0      Feeling bad about yourself - or that you are a failure or have let yourself or your family down  0      Trouble concentrating on things, such as reading the newspaper or watching television  0      Moving or speaking so slowly that other people could have noticed. Or the opposite - being so fidgety or restless that you have been moving around a lot more than usual  0      Thoughts that you would be better off dead, or of hurting yourself in some way  0      PHQ-9 Total Score 0 3 0 0 0   If you checked off any problems, how difficult have these problems made it for you to do your work, take care of things at home, or get along with other people? 0           Abuse Screening:      10/18/2023     3:00 PM   AMB Abuse Screening   Do you ever feel afraid of your partner? N   Are you in a relationship with someone who physically or mentally threatens you? N   Is it safe for you to go home? Y        Learning Assessment Screening:   No question data found. Fall Risk Screenin/6/2023     9:00 AM 10/18/2023     3:10 PM   Fall Risk   2 or more falls in past year? yes no   Fall with injury in past year? yes no           Health Maintenance: reviewed and discussed and ordered per Provider.     Health Maintenance Due
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
## 2023-11-06 NOTE — ACP (ADVANCE CARE PLANNING)
Advance Care Planning     General Advance Care Planning (ACP) Conversation    Date of Conversation: 11/6/2023  Conducted with: Patient with Decision Making Capacity    Healthcare Decision Maker:    Primary Decision Maker: Panchito Durán - [place] - [phone]  Click here to complete [address] including selection of the Healthcare Decision Maker Relationship (ie \"Primary\"). Today we documented Decision Maker(s) consistent with Legal Next of Kin hierarchy.     Content/Action Overview:  Has NO ACP documents/care preferences - requested patient complete ACP documents  Reviewed DNR/DNI and patient elects Full Code (Attempt Resuscitation)  treatment goals, artificial nutrition, ventilation preferences, and resuscitation preferences  Yes to resuscitation and  her  will make decisions about life support     Length of Voluntary ACP Conversation in minutes:  <16 minutes (Non-Billable)    Francie Shipley, HEIDI - CNP

## 2024-01-09 ENCOUNTER — TRANSCRIBE ORDERS (OUTPATIENT)
Facility: HOSPITAL | Age: 69
End: 2024-01-09

## 2024-01-09 DIAGNOSIS — Z12.31 VISIT FOR SCREENING MAMMOGRAM: Primary | ICD-10-CM

## 2024-01-24 ENCOUNTER — HOSPITAL ENCOUNTER (OUTPATIENT)
Dept: WOMENS IMAGING | Facility: HOSPITAL | Age: 69
Discharge: HOME | End: 2024-01-27
Payer: MEDICARE

## 2024-01-24 DIAGNOSIS — Z12.31 VISIT FOR SCREENING MAMMOGRAM: ICD-10-CM

## 2024-01-24 PROCEDURE — 77063 BREAST TOMOSYNTHESIS BI: CPT

## 2024-02-06 ENCOUNTER — OFFICE VISIT (OUTPATIENT)
Facility: CLINIC | Age: 69
End: 2024-02-06
Payer: MEDICARE

## 2024-02-06 VITALS
BODY MASS INDEX: 29.66 KG/M2 | SYSTOLIC BLOOD PRESSURE: 120 MMHG | WEIGHT: 189 LBS | HEIGHT: 67 IN | TEMPERATURE: 97.1 F | RESPIRATION RATE: 16 BRPM | HEART RATE: 71 BPM | OXYGEN SATURATION: 98 % | DIASTOLIC BLOOD PRESSURE: 74 MMHG

## 2024-02-06 DIAGNOSIS — E78.00 ELEVATED LDL CHOLESTEROL LEVEL: ICD-10-CM

## 2024-02-06 DIAGNOSIS — M17.12 ARTHRITIS OF LEFT KNEE: ICD-10-CM

## 2024-02-06 DIAGNOSIS — Z71.85 IMMUNIZATION COUNSELING: ICD-10-CM

## 2024-02-06 DIAGNOSIS — M19.079 ARTHRITIS OF GREAT TOE AT METATARSOPHALANGEAL JOINT: Primary | ICD-10-CM

## 2024-02-06 PROCEDURE — 1090F PRES/ABSN URINE INCON ASSESS: CPT | Performed by: NURSE PRACTITIONER

## 2024-02-06 PROCEDURE — G8417 CALC BMI ABV UP PARAM F/U: HCPCS | Performed by: NURSE PRACTITIONER

## 2024-02-06 PROCEDURE — 3017F COLORECTAL CA SCREEN DOC REV: CPT | Performed by: NURSE PRACTITIONER

## 2024-02-06 PROCEDURE — 1123F ACP DISCUSS/DSCN MKR DOCD: CPT | Performed by: NURSE PRACTITIONER

## 2024-02-06 PROCEDURE — G8427 DOCREV CUR MEDS BY ELIG CLIN: HCPCS | Performed by: NURSE PRACTITIONER

## 2024-02-06 PROCEDURE — G8399 PT W/DXA RESULTS DOCUMENT: HCPCS | Performed by: NURSE PRACTITIONER

## 2024-02-06 PROCEDURE — G8484 FLU IMMUNIZE NO ADMIN: HCPCS | Performed by: NURSE PRACTITIONER

## 2024-02-06 PROCEDURE — 99214 OFFICE O/P EST MOD 30 MIN: CPT | Performed by: NURSE PRACTITIONER

## 2024-02-06 PROCEDURE — 1036F TOBACCO NON-USER: CPT | Performed by: NURSE PRACTITIONER

## 2024-02-06 RX ORDER — PRAVASTATIN SODIUM 20 MG
20 TABLET ORAL DAILY
Qty: 30 TABLET | Refills: 5 | Status: SHIPPED | OUTPATIENT
Start: 2024-02-06

## 2024-02-06 ASSESSMENT — PATIENT HEALTH QUESTIONNAIRE - PHQ9
SUM OF ALL RESPONSES TO PHQ QUESTIONS 1-9: 0
2. FEELING DOWN, DEPRESSED OR HOPELESS: 0
SUM OF ALL RESPONSES TO PHQ QUESTIONS 1-9: 0
SUM OF ALL RESPONSES TO PHQ QUESTIONS 1-9: 0
SUM OF ALL RESPONSES TO PHQ9 QUESTIONS 1 & 2: 0
1. LITTLE INTEREST OR PLEASURE IN DOING THINGS: 0

## 2024-02-06 NOTE — PROGRESS NOTES
Mary Beth Durán is a 69 y.o. female  established patient, here for evaluation of the following chief complaint(s):  Chief Complaint   Patient presents with    3 Month Follow-Up      Assessment and Plan  1. 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 Arthritis of left knee  3. Immunization counseling  4. Elevated LDL cholesterol level  The following orders have not been finalized:  -     pravastatin (PRAVACHOL) 20 MG tablet     Return in about 6 months (around 8/6/2024) for Routine, 20.   HPI:   In office visit.  Pt has seen a foot specialist about her right great toe that has been a purple shade.  She was told she has arthritis in that toe. She was told to use compression on her toe and given a cream they sale in the office. Her toe looks a lot better.    Mammogram current   Colon cancer screening due 2006   Pt got her RSV vaccine   ROS:    General: negative for - chills, fever, weight changes or malaise  HEENT: no sore throat, nasal congestion, vision problems or ear problems  Respiratory: no cough, shortness of breath, or wheezing  Cardiovascular: no chest pain, palpitations, or dyspnea on exertion  Gastrointestinal: no abdominal pain, N/V, change in bowel habits  Musculoskeletal: no back pain or joint pain  Neurological: no headache or dizziness  Endo:  No polyuria or polydipsia  : no urinary  Skin: none   Psychological: negative for - anxiety, depression, sleeps issues    Prior to Admission medications    Medication Sig Start Date End Date Taking? Authorizing Provider   Cholecalciferol (VITAMIN D) 50 MCG (2000 UT) CAPS capsule Take 2,000 Units by mouth daily 2/16/23   Amna Bond APRN - CNP   Calcium-Phosphorus-Vitamin D (CITRACAL +D3) 250-107-500 MG-MG-UNIT CHEW Take 2 tablets by mouth in the morning and at bedtime 2/16/23   Amna Bond APRN - CNP   Acetaminophen (TYLENOL DISSOLVE PACKS) 500 MG PACK Take by mouth    Automatic Reconciliation, Ar   diphenhydrAMINE (BENADRYL) 25 MG capsule

## 2024-02-06 NOTE — PROGRESS NOTES
Mary Beth Durán is a 69 y.o. female (: 1955) presenting to address:    Chief Complaint   Patient presents with    3 Month Follow-Up       Vitals:    24 0837   BP: 120/74   Pulse: 71   Resp: 16   Temp: 97.1 °F (36.2 °C)   SpO2: 98%       Coordination of Care Questionaire:   1. \"Have you been to the ER, urgent care clinic since your last visit?  Hospitalized since your last visit?\" no    2. \"Have you seen or consulted any other health care providers outside of the [facility] since your last visit?\" no     3. For patients aged 45-75: Has the patient had a colonoscopy / FIT/ Cologuard? no      If the patient is female:    4. For patients aged 40-74: Has the patient had a mammogram within the past 2 years? yes      5. For patients aged 21-65: Has the patient had a pap smear? N/a    Advanced Directive:   1. Do you have an Advanced Directive? No    2. Would you like information on Advanced Directives? No

## 2024-02-14 ENCOUNTER — TELEPHONE (OUTPATIENT)
Facility: CLINIC | Age: 69
End: 2024-02-14

## 2024-02-14 NOTE — TELEPHONE ENCOUNTER
----- Message from Mary Beth Durán sent at 2/13/2024  4:56 PM EST -----  Regarding: Cholesterol med  Contact: [phone]  I have been experiencing some extreme pain in the muscles around my left knee. I normally can have a simple ache from arthritis, but this is a lot worse. I stopped taking the medication as one of the side effects were muscle pain. It’s been two days and I feel like the muscle pain is getting worse, any suggestions?

## 2024-02-19 ENCOUNTER — OFFICE VISIT (OUTPATIENT)
Facility: CLINIC | Age: 69
End: 2024-02-19
Payer: MEDICARE

## 2024-02-19 VITALS
TEMPERATURE: 97.7 F | DIASTOLIC BLOOD PRESSURE: 53 MMHG | WEIGHT: 193 LBS | OXYGEN SATURATION: 99 % | HEART RATE: 64 BPM | SYSTOLIC BLOOD PRESSURE: 120 MMHG | HEIGHT: 67 IN | RESPIRATION RATE: 18 BRPM | BODY MASS INDEX: 30.29 KG/M2

## 2024-02-19 DIAGNOSIS — G89.29 CHRONIC PAIN OF LEFT KNEE: ICD-10-CM

## 2024-02-19 DIAGNOSIS — M17.12 ARTHRITIS OF LEFT KNEE: Primary | ICD-10-CM

## 2024-02-19 DIAGNOSIS — M25.562 CHRONIC PAIN OF LEFT KNEE: ICD-10-CM

## 2024-02-19 PROCEDURE — 1036F TOBACCO NON-USER: CPT | Performed by: NURSE PRACTITIONER

## 2024-02-19 PROCEDURE — G8417 CALC BMI ABV UP PARAM F/U: HCPCS | Performed by: NURSE PRACTITIONER

## 2024-02-19 PROCEDURE — 99213 OFFICE O/P EST LOW 20 MIN: CPT | Performed by: NURSE PRACTITIONER

## 2024-02-19 PROCEDURE — G8399 PT W/DXA RESULTS DOCUMENT: HCPCS | Performed by: NURSE PRACTITIONER

## 2024-02-19 PROCEDURE — 3017F COLORECTAL CA SCREEN DOC REV: CPT | Performed by: NURSE PRACTITIONER

## 2024-02-19 PROCEDURE — 1123F ACP DISCUSS/DSCN MKR DOCD: CPT | Performed by: NURSE PRACTITIONER

## 2024-02-19 PROCEDURE — 1090F PRES/ABSN URINE INCON ASSESS: CPT | Performed by: NURSE PRACTITIONER

## 2024-02-19 PROCEDURE — G8484 FLU IMMUNIZE NO ADMIN: HCPCS | Performed by: NURSE PRACTITIONER

## 2024-02-19 PROCEDURE — G8427 DOCREV CUR MEDS BY ELIG CLIN: HCPCS | Performed by: NURSE PRACTITIONER

## 2024-02-19 ASSESSMENT — PATIENT HEALTH QUESTIONNAIRE - PHQ9
SUM OF ALL RESPONSES TO PHQ9 QUESTIONS 1 & 2: 0
SUM OF ALL RESPONSES TO PHQ QUESTIONS 1-9: 0
SUM OF ALL RESPONSES TO PHQ QUESTIONS 1-9: 0
2. FEELING DOWN, DEPRESSED OR HOPELESS: 0
SUM OF ALL RESPONSES TO PHQ QUESTIONS 1-9: 0
1. LITTLE INTEREST OR PLEASURE IN DOING THINGS: 0
SUM OF ALL RESPONSES TO PHQ QUESTIONS 1-9: 0

## 2024-02-19 NOTE — PROGRESS NOTES
\"Have you been to the ER, urgent care clinic since your last visit?  Hospitalized since your last visit?\"    NO    “Have you seen or consulted any other health care providers outside of [facility] since your last visit?”    NO    Mary Beth Durán presents today for   Chief Complaint   Patient presents with    Knee Pain       Is someone accompanying this pt? No    Is the patient using any DME equipment during OV? No     Depression Screenin/19/2024     9:29 AM 2024     8:43 AM 2023     9:02 AM 10/18/2023     3:14 PM 2023     9:12 AM 2022    10:30 AM 2022     9:44 AM   PHQ-9 Questionaire   Little interest or pleasure in doing things 0 0 0 0 0 0 0   Feeling down, depressed, or hopeless 0 0 0 0 0 0 0   Trouble falling or staying asleep, or sleeping too much    2      Feeling tired or having little energy    1      Poor appetite or overeating    0      Feeling bad about yourself - or that you are a failure or have let yourself or your family down    0      Trouble concentrating on things, such as reading the newspaper or watching television    0      Moving or speaking so slowly that other people could have noticed. Or the opposite - being so fidgety or restless that you have been moving around a lot more than usual    0      Thoughts that you would be better off dead, or of hurting yourself in some way    0      PHQ-9 Total Score 0 0 0 3 0 0 0   If you checked off any problems, how difficult have these problems made it for you to do your work, take care of things at home, or get along with other people?    0            2024     9:29 AM 2024     8:43 AM 2023     9:02 AM 10/18/2023     3:14 PM 2023     9:12 AM 2022    10:30 AM 2022     9:44 AM   PHQ Scores   PHQ2 Score 0 0 0 0 0 0 0   PHQ9 Score 0 0 0 3 0 0 0       Learning Assessment:  No question data found.    Abuse Screening:      10/18/2023     3:00 PM   AMB Abuse Screening   Do you ever feel 
Reconciliation, Ar   diphenhydrAMINE (BENADRYL) 25 MG capsule Take 1 capsule by mouth   Yes Automatic Reconciliation, Ar        No results found for this visit on 02/19/24.     Physical Exam  Patient appears well, she is pleasant, alert, oriented x 3, in no distress. obese  ENT normal.  Neck supple. No adenopathy or thyromegaly. MARY.   Lungs are clear, good air entry, no wheezes  Cardiovascular, S1 and S2 normal, no murmurs, regular rate and rhythm.   Abdomen is soft without tenderness, guarding  /Anorectal, deferred.  Muscleskeletal, no swelling  Extremities show no edema  Neurological is normal without focal findings.   Skin: no concerning lesions.    Psych: normal affect.  Mood good.  Oriented x 3.      Vitals:    02/19/24 0926   BP: (!) 120/53   Pulse: 64   Resp: 18   Temp: 97.7 °F (36.5 °C)   SpO2: 99%   Weight: 87.5 kg (193 lb)   Height: 1.702 m (5' 7\")      Pain Score:   0 - No pain     On this date 02/19/24  have spent 30 minutes reviewing previous notes, test results and face to face with the patient discussing the diagnosis and importance of compliance with the treatment plan as well as documenting on the day of the visit.      THEODORE Arias    
4

## 2024-03-12 ENCOUNTER — HOSPITAL ENCOUNTER (OUTPATIENT)
Facility: HOSPITAL | Age: 69
Setting detail: RECURRING SERIES
Discharge: HOME | End: 2024-03-15
Payer: MEDICARE

## 2024-03-12 PROCEDURE — 97110 THERAPEUTIC EXERCISES: CPT

## 2024-03-12 PROCEDURE — 97530 THERAPEUTIC ACTIVITIES: CPT

## 2024-03-12 PROCEDURE — 97161 PT EVAL LOW COMPLEX 20 MIN: CPT

## 2024-03-12 NOTE — PROGRESS NOTES
[facility]  [address] [address] Phone: [phone] Fax   Plan of Care / Statement of Necessity for Physical Therapy Services     Patient Name: Mary Beth Durán : 1955   Medical   Diagnosis: Left knee pain [M25.562] Treatment Diagnosis: M25.562  LEFT KNEE PAIN      Onset Date: 24 (referral) Payor Payor: MEDICARE / Plan: MEDICARE PART A AND B / Product Type: *No Product type* /    Referral Source: Amna Bond APRN -* Start of Care (SOC): 3/12/2024   Prior Hospitalization: See medical history Provider #: [id number]   Prior Level of Function: Functionally independent, lives in [address] [place] home   Comorbidities: HLD, frequent falls in the past few months     Assessment / key information:    Pt is a pleasant 69 y.o. female who presents with c/o left knee pain. The patient reports an intermittent history of left knee pain for the past two decades with gradual worsening in the past few years, most recent exacerbation in 2024. Signs/symptoms at eval consistent with left knee osteoarthritis.  Functional deficits include: impaired LE strength, impaired left knee AROM, impaired transfer ability, impaired standing tolerance, impaired standing balance.  Rehab potential is good due to desire to attain PLOF. Pt would benefit from skilled PT to address above deficits to improve Pt's function and ability to return to PLOF with decreased pain and improved functional mobility.      Evaluation Complexity:  History:  HIGH Complexity :3+ comorbidities / personal factors will impact the outcome/ POC ; Examination:  LOW Complexity : 1-2 Standardized tests and measures addressing body structure, function, activity limitation and / or participation in recreation  ;Presentation:  LOW Complexity : Stable, uncomplicated  ;Clinical Decision Making:  MEDIUM Complexity : FOTO score of 26-74 FOTO score = an established functional score where 100 = no 
for goals and assessment     PLAN  [x]  Upgrade activities as tolerated     [x]  Continue plan of care  []  Update interventions per flow sheet       []  Other:_      Patel Wu, PT 3/12/2024  11:47 AM

## 2024-03-19 ENCOUNTER — HOSPITAL ENCOUNTER (OUTPATIENT)
Facility: HOSPITAL | Age: 69
Setting detail: RECURRING SERIES
Discharge: HOME | End: 2024-03-22
Payer: MEDICARE

## 2024-03-19 PROCEDURE — 97530 THERAPEUTIC ACTIVITIES: CPT

## 2024-03-19 PROCEDURE — 97110 THERAPEUTIC EXERCISES: CPT

## 2024-03-19 PROCEDURE — 97112 NEUROMUSCULAR REEDUCATION: CPT

## 2024-03-19 NOTE — PROGRESS NOTES
PHYSICAL / OCCUPATIONAL THERAPY - DAILY TREATMENT NOTE    Patient Name: Mary Beth Durán    Date: 3/19/2024    : 1955  Insurance: Payor: MEDICARE / Plan: MEDICARE PART A AND B / Product Type: *No Product type* /      Patient  verified Yes     Visit #   Current / Total 2 10   Time   In / Out 2:21 3:10 (49)   Pain   In / Out 3 1   Subjective Functional Status/Changes: \"I was in the car for 5 hours to [facility] and did OK. I got out and did some walking when I could. I mostly did my HEP and can see some encouragement from that\"     TREATMENT AREA =  Left knee pain [M25.562]    OBJECTIVE    Therapeutic Procedures:    Tx Min Billable or 1:1 Min (if diff from Tx Min) Procedure, Rationale, Specifics   20  41652 Therapeutic Exercise (timed):  increase ROM, strength, coordination, balance, and proprioception to improve patient's ability to progress to PLOF and address remaining functional goals. (see flow sheet as applicable)     Details if applicable:    Initiated TE program, see sheet   Pt education about DOMS, modalities as needed at PT and home   Con't with HEP    17  58753 Neuromuscular Re-Education (timed):  improve balance, coordination, kinesthetic sense, posture, core stability and proprioception to improve patient's ability to develop conscious control of individual muscles and awareness of position of extremities in order to progress to PLOF and address remaining functional goals. (see flow sheet as applicable)     Details if applicable:    Added MSR to address goal for SLS and improved balance and safety    12  73930 Therapeutic Activity (timed):  use of dynamic activities replicating functional movements to increase ROM, strength, coordination, balance, and proprioception in order to improve patient's ability to progress to PLOF and address remaining functional goals.  (see flow sheet as applicable)     Details if applicable:      Squats to target on chair with airex: 2x10 with cues for posterior

## 2024-03-26 ENCOUNTER — HOSPITAL ENCOUNTER (OUTPATIENT)
Facility: HOSPITAL | Age: 69
Setting detail: RECURRING SERIES
Discharge: HOME | End: 2024-03-29
Payer: MEDICARE

## 2024-03-26 PROCEDURE — 97112 NEUROMUSCULAR REEDUCATION: CPT

## 2024-03-26 PROCEDURE — 97110 THERAPEUTIC EXERCISES: CPT

## 2024-03-26 NOTE — PROGRESS NOTES
PHYSICAL / OCCUPATIONAL THERAPY - DAILY TREATMENT NOTE    Patient Name: Mary Beth Durán    Date: 3/26/2024    : 1955  Insurance: Payor: MEDICARE / Plan: MEDICARE PART A AND B / Product Type: *No Product type* /      Patient  verified Yes     Visit #   Current / Total 3 10   Time   In / Out 2:31 3:02   Pain   In / Out 2 3   Subjective Functional Status/Changes: Pt reports her pain is not too bad today.      TREATMENT AREA =  Left knee pain [M25.562]    OBJECTIVE  Therapeutic Procedures:    Tx Min Billable or 1:1 Min (if diff from Tx Min) Procedure, Rationale, Specifics   10  21734 Therapeutic Exercise (timed):  increase ROM, strength, coordination, balance, and proprioception to improve patient's ability to progress to PLOF and address remaining functional goals. (see flow sheet as applicable)     Details if applicable:     21  15870 Neuromuscular Re-Education (timed):  improve balance, coordination, kinesthetic sense, posture, core stability and proprioception to improve patient's ability to develop conscious control of individual muscles and awareness of position of extremities in order to progress to PLOF and address remaining functional goals. (see flow sheet as applicable)     Details if applicable:     31  [facility] Totals Reminder: bill using total billable min of TIMED therapeutic procedures (example: do not include dry needle or estim unattended, both untimed codes, in totals to left)  8-22 min = 1 unit; 23-37 min = 2 units; 38-52 min = 3 units; 53-67 min = 4 units; 68-82 min = 5 units   Total Total     [x]  Patient Education billed concurrently with other procedures   [x] Review HEP    [] Progressed/Changed HEP, detail:    [] Other detail:       Objective Information/Functional Measures/Assessment  Reported a mild increase in pain post session today. Held some exercise today secondary to pt being 11 mins late today. Good eccentric control noted with DL and SL leg press today. Mild cues given for form

## 2024-03-28 ENCOUNTER — HOSPITAL ENCOUNTER (OUTPATIENT)
Facility: HOSPITAL | Age: 69
Setting detail: RECURRING SERIES
Discharge: HOME | End: 2024-03-31
Payer: MEDICARE

## 2024-03-28 PROCEDURE — 97110 THERAPEUTIC EXERCISES: CPT

## 2024-03-28 PROCEDURE — 97530 THERAPEUTIC ACTIVITIES: CPT

## 2024-03-28 PROCEDURE — 97112 NEUROMUSCULAR REEDUCATION: CPT

## 2024-03-28 NOTE — PROGRESS NOTES
PHYSICAL / OCCUPATIONAL THERAPY - DAILY TREATMENT NOTE (updated )    Patient Name: Mary Beth Durán    Date: 3/28/2024    : 1955  Insurance: Payor: MEDICARE / Plan: MEDICARE PART A AND B / Product Type: *No Product type* /      Patient  verified yes     Visit #   Current / Total 4 10 Total Time   Time   In / Out 2:22 3:20 58   Pain   In / Out 3 3    Subjective Functional Status/Changes: The exercises are helping     TREATMENT AREA =  Left knee pain [M25.562]    OBJECTIVE    Therapeutic Procedures:  58  Total 55  Total [facility] Totals Reminder: bill using total billable min of TIMED therapeutic procedures (example: do not include dry needle or estim unattended, both untimed codes, in totals to left)  8-22 min = 1 unit; 23-37 min = 2 units; 38-52 min = 3 units; 53-67 min = 4 units; 68-82 min = 5 units   Tx Min Billable or 1:1 Min (if diff from Tx Min) Procedure, Rationale, Specifics   28 26 39880 Therapeutic Exercise (timed):  increase ROM, strength, coordination, balance, and proprioception to improve patient's ability to progress to PLOF and address remaining functional goals. (see flow sheet as applicable)   Details if applicable:       15 15 33716 Therapeutic Activity (timed):  use of dynamic activities replicating functional movements to increase ROM, strength, coordination, balance, and proprioception in order to improve patient's ability to progress to PLOF and address remaining functional goals.  (see flow sheet as applicable)   Details if applicable:       15 15 41884 Neuromuscular Re-Education (timed):  improve balance, coordination, kinesthetic sense, posture, core stability and proprioception to improve patient's ability to develop conscious control of individual muscles and awareness of position of extremities in order to progress to PLOF and address remaining functional goals. (see flow sheet as applicable)     Details if applicable:           [x]  Patient Education billed concurrently with other

## 2024-04-02 ENCOUNTER — HOSPITAL ENCOUNTER (OUTPATIENT)
Facility: HOSPITAL | Age: 69
Setting detail: RECURRING SERIES
Discharge: HOME | End: 2024-04-05
Payer: MEDICARE

## 2024-04-02 PROCEDURE — 97110 THERAPEUTIC EXERCISES: CPT

## 2024-04-02 PROCEDURE — 97530 THERAPEUTIC ACTIVITIES: CPT

## 2024-04-02 NOTE — PROGRESS NOTES
PHYSICAL / OCCUPATIONAL THERAPY - DAILY TREATMENT NOTE (updated )    Patient Name: Mary Beth Durán    Date: 2024    : 1955  Insurance: Payor: MEDICARE / Plan: MEDICARE PART A AND B / Product Type: *No Product type* /      Patient  verified yes     Visit #   Current / Total 5 10 Total Time   Time   In / Out 2:20 3:00 40   Pain   In / Out 2-3 2-3    Subjective Functional Status/Changes: I did a lot of standing over Easter weekend and that hurt my knee, I am still recovering.     TREATMENT AREA =  Left knee pain [M25.562]    OBJECTIVE    Therapeutic Procedures:  40  Total 40  Total  BC Totals Reminder: bill using total billable min of TIMED therapeutic procedures (example: do not include dry needle or estim unattended, both untimed codes, in totals to left)  8-22 min = 1 unit; 23-37 min = 2 units; 38-52 min = 3 units; 53-67 min = 4 units; 68-82 min = 5 units   Tx Min Billable or 1:1 Min (if diff from Tx Min) Procedure, Rationale, Specifics   25  87958 Therapeutic Exercise (timed):  increase ROM, strength, coordination, balance, and proprioception to improve patient's ability to progress to PLOF and address remaining functional goals. (see flow sheet as applicable)   Details if applicable:       15  75476 Therapeutic Activity (timed):  use of dynamic activities replicating functional movements to increase ROM, strength, coordination, balance, and proprioception in order to improve patient's ability to progress to PLOF and address remaining functional goals.  (see flow sheet as applicable)   Details if applicable:           [x]  Patient Education billed concurrently with other procedures   [x] Review HEP    [] Progressed/Changed HEP, detail:    [] Other detail:       Objective Information/Functional Measures/Assessment    - The patient continues to report substantial functional challenges with prolonged due to increased knee pain.  - Implemented SORAYA ann into treatment program to directly

## 2024-04-04 ENCOUNTER — HOSPITAL ENCOUNTER (OUTPATIENT)
Facility: HOSPITAL | Age: 69
Setting detail: RECURRING SERIES
End: 2024-04-04
Payer: MEDICARE

## 2024-04-08 ENCOUNTER — HOSPITAL ENCOUNTER (OUTPATIENT)
Facility: HOSPITAL | Age: 69
Setting detail: RECURRING SERIES
Discharge: HOME | End: 2024-04-11
Payer: MEDICARE

## 2024-04-08 PROCEDURE — 97110 THERAPEUTIC EXERCISES: CPT

## 2024-04-08 PROCEDURE — 97530 THERAPEUTIC ACTIVITIES: CPT

## 2024-04-08 NOTE — PROGRESS NOTES
PHYSICAL / OCCUPATIONAL THERAPY - DAILY TREATMENT NOTE (updated )    Patient Name: Mary Beth Durán    Date: 2024    : 1955  Insurance: Payor: MEDICARE / Plan: MEDICARE PART A AND B / Product Type: *No Product type* /      Patient  verified yes     Visit #   Current / Total 6 10 Total Time   Time   In / Out 1:41 2:33 52   Pain   In / Out 2 1    Subjective Functional Status/Changes: Sorry I missed my last appt, I was in the hospital waiting with my  for his test results.     TREATMENT AREA =  Left knee pain [M25.562]    OBJECTIVE      Therapeutic Procedures:  52  Total 26  Total  BC Totals Reminder: bill using total billable min of TIMED therapeutic procedures (example: do not include dry needle or estim unattended, both untimed codes, in totals to left)  8-22 min = 1 unit; 23-37 min = 2 units; 38-52 min = 3 units; 53-67 min = 4 units; 68-82 min = 5 units   Tx Min Billable or 1:1 Min (if diff from Tx Min) Procedure, Rationale, Specifics   31 84 29123 Therapeutic Exercise (timed):  increase ROM, strength, coordination, balance, and proprioception to improve patient's ability to progress to PLOF and address remaining functional goals. (see flow sheet as applicable)   Details if applicable:       21 10 57246 Therapeutic Activity (timed):  use of dynamic activities replicating functional movements to increase ROM, strength, coordination, balance, and proprioception in order to improve patient's ability to progress to PLOF and address remaining functional goals.  (see flow sheet as applicable)   Details if applicable:           [x]  Patient Education billed concurrently with other procedures   [x] Review HEP    [] Progressed/Changed HEP, detail:    [] Other detail:       Objective Information/Functional Measures/Assessment    - Focus of today's treatment on continuing to progress functional strengthening interventions.   - The patient requires skilled verbal cuing to improve technique with squat to

## 2024-04-10 ENCOUNTER — HOSPITAL ENCOUNTER (OUTPATIENT)
Facility: HOSPITAL | Age: 69
Setting detail: RECURRING SERIES
Discharge: HOME | End: 2024-04-13
Payer: MEDICARE

## 2024-04-10 PROCEDURE — 97112 NEUROMUSCULAR REEDUCATION: CPT

## 2024-04-10 PROCEDURE — 97530 THERAPEUTIC ACTIVITIES: CPT

## 2024-04-10 PROCEDURE — 97110 THERAPEUTIC EXERCISES: CPT

## 2024-04-10 NOTE — PROGRESS NOTES
[facility] [place]   [address] [place] [address]  Phone: [phone] Fax: [phone]  PROGRESS NOTE  Patient Name: Mary Beth Durán : 1955   Treatment/Medical Diagnosis: Left knee pain [M25.562]   Referral Source: Amna Bond APRN -* Payor: Payor: MEDICARE / Plan: MEDICARE PART A AND B / Product Type: *No Product type* /    Date of Initial Visit: 3/12/24 Attended Visits: 7 Missed Visits: 1     SUMMARY OF TREATMENT  Pt is a pleasant 69 y.o. female who presents with c/o left knee pain. Treatment has consisted of: therapeutic exercise to improve LE/lumbar strength/mobility; therapeutic activities to improve transfer/lift/carry ability; neuromuscular re-education to improve balance, core stability, neuromuscular control with lifting; physical agent/modality for symptom management; manual therapy for symptom relief and muscle flexibility; patient education to improve symptom management; self Care training; home safety training; stair training, and functional mobility training.      CURRENT STATUS  Patient has attended PT for 7 sessions for the treatment of left knee pain. The patient demonstrates moderate progress at this time. She demonstrates improved knee range of motion and improved ease with transfers from standard height since SOC.  Her single leg stance balance remains limited and she report continued start up pain in the morning. Additional assessment includes:  Subjective Gains: more confident with stair negotiation, better strength in legs, sleeping better  Subjective Deficits: difficulty with full knee extension, increased start up pain, inc pain with squatting  % improvement: 50%  Pain   Average: 2/10       Best: 1/10     Worst: 3-4/10  Patient Goal: \"feel comfortable walking my dog and not worry about falling\"  Objective Measures:   Knee AAROM:  Left Knee 3-140 deg     Strength:    Right (/5) Left (/5)   Hip     Flexion 5 5

## 2024-04-10 NOTE — PROGRESS NOTES
PHYSICAL / OCCUPATIONAL THERAPY - DAILY TREATMENT NOTE (updated )    Patient Name: Mary Beth Durán    Date: 4/10/2024    : 1955  Insurance: Payor: MEDICARE / Plan: MEDICARE PART A AND B / Product Type: *No Product type* /      Patient  verified yes     Visit #   Current / Total 7 10 Total Time   Time   In / Out 1:00 1:58 58   Pain   In / Out 2 2    Subjective Functional Status/Changes: I had some hip discomfort after all the clamshells next session.     TREATMENT AREA =  Left knee pain [M25.562]    OBJECTIVE    Therapeutic Procedures:  58  Total 58  Total [facility] Totals Reminder: bill using total billable min of TIMED therapeutic procedures (example: do not include dry needle or estim unattended, both untimed codes, in totals to left)  8-22 min = 1 unit; 23-37 min = 2 units; 38-52 min = 3 units; 53-67 min = 4 units; 68-82 min = 5 units   Tx Min Billable or 1:1 Min (if diff from Tx Min) Procedure, Rationale, Specifics   28  31717 Therapeutic Exercise (timed):  increase ROM, strength, coordination, balance, and proprioception to improve patient's ability to progress to PLOF and address remaining functional goals. (see flow sheet as applicable)   Details if applicable:       20  45062 Therapeutic Activity (timed):  use of dynamic activities replicating functional movements to increase ROM, strength, coordination, balance, and proprioception in order to improve patient's ability to progress to PLOF and address remaining functional goals.  (see flow sheet as applicable)   Details if applicable:       10  86774 Neuromuscular Re-Education (timed):  improve balance, coordination, kinesthetic sense, posture, core stability and proprioception to improve patient's ability to develop conscious control of individual muscles and awareness of position of extremities in order to progress to PLOF and address remaining functional goals. (see flow sheet as applicable)     Details if applicable:           [x]  Patient

## 2024-04-15 ENCOUNTER — HOSPITAL ENCOUNTER (OUTPATIENT)
Facility: HOSPITAL | Age: 69
Setting detail: RECURRING SERIES
Discharge: HOME | End: 2024-04-18
Payer: MEDICARE

## 2024-04-15 PROCEDURE — 97116 GAIT TRAINING THERAPY: CPT

## 2024-04-15 PROCEDURE — 97112 NEUROMUSCULAR REEDUCATION: CPT

## 2024-04-15 PROCEDURE — 97530 THERAPEUTIC ACTIVITIES: CPT

## 2024-04-15 NOTE — PROGRESS NOTES
note/certification: 4-/5 B  - Goal: Pt will demo B SLS for at least 30 seconds without LOB to improve safety with obstacle negotiation and curb ascent/descent.  Status at last note/certification: right 14\", left 6\"   - Goal: Patient will increase FOTO score to at least 70 pts to demonstrate increased functional mobility.  Status at last note/certification: FOTO 52 pts   PN due 5/10/24  RC due 6/10/24    PLAN  [x] Continue plan of care  [x]  Upgrade activities as tolerated  []  Discharge due to :  []  Other:    Patel Wu, PT    4/15/2024    12:36 PM    Future Appointments   Date Time Provider Department Center   4/15/2024  1:40 PM Patel Wu, PT [facility]   4/17/2024  2:00 PM Patel Wu, PT [facility]   4/22/2024  1:40 PM Patel Wu, PT [facility]   4/24/2024  1:40 PM Patel Wu, PT [facility]   4/29/2024  1:40 PM [facility]   5/1/2024  1:40 PM [facility] PT [facility]   8/1/2024  8:30 AM ANGELITO, LAB [facility]   8/6/2024  8:30 AM Amna Bond APRN - CNP [facility]   1/27/2025  8:45 AM [facility]E STEREO BX RM 1 [facility]

## 2024-04-17 ENCOUNTER — HOSPITAL ENCOUNTER (OUTPATIENT)
Facility: HOSPITAL | Age: 69
Setting detail: RECURRING SERIES
Discharge: HOME | End: 2024-04-20
Payer: MEDICARE

## 2024-04-17 PROCEDURE — 97112 NEUROMUSCULAR REEDUCATION: CPT

## 2024-04-17 PROCEDURE — 97110 THERAPEUTIC EXERCISES: CPT

## 2024-04-17 PROCEDURE — 97116 GAIT TRAINING THERAPY: CPT

## 2024-04-17 NOTE — PROGRESS NOTES
PHYSICAL / OCCUPATIONAL THERAPY - DAILY TREATMENT NOTE (updated )    Patient Name: Mary Beth Durán    Date: 2024    : 1955  Insurance: Payor: MEDICARE / Plan: MEDICARE PART A AND B / Product Type: *No Product type* /      Patient  verified yes     Visit #   Current / Total 2 10 Total Time   Time   In / Out 2:00 2:43 43   Pain   In / Out 3 2-3    Subjective Functional Status/Changes: It's a bit more painful because I was doing some heavier housework today.     TREATMENT AREA =  Left knee pain [M25.562]    OBJECTIVE      Therapeutic Procedures:  43  Total 43  Total  BC Totals Reminder: bill using total billable min of TIMED therapeutic procedures (example: do not include dry needle or estim unattended, both untimed codes, in totals to left)  8-22 min = 1 unit; 23-37 min = 2 units; 38-52 min = 3 units; 53-67 min = 4 units; 68-82 min = 5 units   Tx Min Billable or 1:1 Min (if diff from Tx Min) Procedure, Rationale, Specifics   18  72585 Therapeutic Exercise (timed):  increase ROM, strength, coordination, balance, and proprioception to improve patient's ability to progress to PLOF and address remaining functional goals. (see flow sheet as applicable)   Details if applicable:       15  05481 Neuromuscular Re-Education (timed):  improve balance, coordination, kinesthetic sense, posture, core stability and proprioception to improve patient's ability to develop conscious control of individual muscles and awareness of position of extremities in order to progress to PLOF and address remaining functional goals. (see flow sheet as applicable)     Details if applicable:       10  88040 Gait Training (timed):    40 feet x 2 of following-  [x] March            [] Lateral                   [x] Horizontal HT  [] VORx 1        [] Banded Lateral     [x] Vertical HT  [] Retrograde    [] Banded Monster   [x] Dual Task         [x]  Patient Education billed concurrently with other procedures   [x] Review HEP    []

## 2024-04-22 ENCOUNTER — HOSPITAL ENCOUNTER (OUTPATIENT)
Facility: HOSPITAL | Age: 69
Setting detail: RECURRING SERIES
Discharge: HOME | End: 2024-04-25
Payer: MEDICARE

## 2024-04-22 PROCEDURE — 97110 THERAPEUTIC EXERCISES: CPT

## 2024-04-22 PROCEDURE — 97116 GAIT TRAINING THERAPY: CPT

## 2024-04-22 PROCEDURE — 97530 THERAPEUTIC ACTIVITIES: CPT

## 2024-04-22 NOTE — PROGRESS NOTES
PHYSICAL / OCCUPATIONAL THERAPY - DAILY TREATMENT NOTE (updated )    Patient Name: Mary Beth Druán    Date: 2024    : 1955  Insurance: Payor: MEDICARE / Plan: MEDICARE PART A AND B / Product Type: *No Product type* /      Patient  verified yes     Visit #   Current / Total 3 10 Total Time   Time   In / Out 1:40 2:30 50   Pain   In / Out 2 2    Subjective Functional Status/Changes: My leg is feeling better today but my shin was bothering me last time.     TREATMENT AREA =  Left knee pain [M25.562]    OBJECTIVE      Therapeutic Procedures:  50  Total 40  Total  BC Totals Reminder: bill using total billable min of TIMED therapeutic procedures (example: do not include dry needle or estim unattended, both untimed codes, in totals to left)  8-22 min = 1 unit; 23-37 min = 2 units; 38-52 min = 3 units; 53-67 min = 4 units; 68-82 min = 5 units   Tx Min Billable or 1:1 Min (if diff from Tx Min) Procedure, Rationale, Specifics    15 51430 Therapeutic Exercise (timed):  increase ROM, strength, coordination, balance, and proprioception to improve patient's ability to progress to PLOF and address remaining functional goals. (see flow sheet as applicable)   Details if applicable:       15 15 43938 Therapeutic Activity (timed):  use of dynamic activities replicating functional movements to increase ROM, strength, coordination, balance, and proprioception in order to improve patient's ability to progress to PLOF and address remaining functional goals.  (see flow sheet as applicable)   Details if applicable:       10 10 01611 Gait Training (timed):   40 feet x 2 of following-  [x] March            [] Lateral                   [x] Horizontal HT  [] VORx 1        [] Banded Lateral     [x] Vertical HT  [x] Retrograde    [] Banded Monster   [x] Dual Task (finger to nose)       [x]  Patient Education billed concurrently with other procedures   [x] Review HEP    [] Progressed/Changed HEP, detail:    [] Other detail:

## 2024-04-24 ENCOUNTER — HOSPITAL ENCOUNTER (OUTPATIENT)
Facility: HOSPITAL | Age: 69
Setting detail: RECURRING SERIES
Discharge: HOME | End: 2024-04-27
Payer: MEDICARE

## 2024-04-24 PROCEDURE — 97110 THERAPEUTIC EXERCISES: CPT

## 2024-04-24 PROCEDURE — 97530 THERAPEUTIC ACTIVITIES: CPT

## 2024-04-24 NOTE — PROGRESS NOTES
PHYSICAL / OCCUPATIONAL THERAPY - DAILY TREATMENT NOTE (updated )    Patient Name: Mary Beth Durán    Date: 2024    : 1955  Insurance: Payor: MEDICARE / Plan: MEDICARE PART A AND B / Product Type: *No Product type* /      Patient  verified yes     Visit #   Current / Total 4 10 Total Time   Time   In / Out 1:43 2:25 42   Pain   In / Out 2-3 2-3    Subjective Functional Status/Changes: It is giving me more trouble today than usual.     TREATMENT AREA =  Left knee pain [M25.562]    OBJECTIVE      Therapeutic Procedures:  42  Total 40  Total  BC Totals Reminder: bill using total billable min of TIMED therapeutic procedures (example: do not include dry needle or estim unattended, both untimed codes, in totals to left)  8-22 min = 1 unit; 23-37 min = 2 units; 38-52 min = 3 units; 53-67 min = 4 units; 68-82 min = 5 units   Tx Min Billable or 1:1 Min (if diff from Tx Min) Procedure, Rationale, Specifics   30 28 56609 Therapeutic Exercise (timed):  increase ROM, strength, coordination, balance, and proprioception to improve patient's ability to progress to PLOF and address remaining functional goals. (see flow sheet as applicable)   Details if applicable:        83082 Therapeutic Activity (timed):  use of dynamic activities replicating functional movements to increase ROM, strength, coordination, balance, and proprioception in order to improve patient's ability to progress to PLOF and address remaining functional goals.  (see flow sheet as applicable)   Details if applicable:       NT  85182 Gait Training (timed):  40 feet x 2 of following-  [x] March            [] Lateral                   [x] Horizontal HT  [] VORx 1        [] Banded Lateral     [x] Vertical HT  [x] Retrograde    [] Banded Monster   [x] Dual Task (finger to nose)       [x]  Patient Education billed concurrently with other procedures   [x] Review HEP    [] Progressed/Changed HEP, detail:    [] Other detail:       Objective

## 2024-04-29 ENCOUNTER — HOSPITAL ENCOUNTER (OUTPATIENT)
Facility: HOSPITAL | Age: 69
Setting detail: RECURRING SERIES
Discharge: HOME | End: 2024-05-02
Payer: MEDICARE

## 2024-04-29 PROCEDURE — 97112 NEUROMUSCULAR REEDUCATION: CPT

## 2024-04-29 PROCEDURE — 97110 THERAPEUTIC EXERCISES: CPT

## 2024-04-29 NOTE — PROGRESS NOTES
PHYSICAL / OCCUPATIONAL THERAPY - DAILY TREATMENT NOTE (updated )    Patient Name: Mary Beth Durán    Date: 2024    : 1955  Insurance: Payor: MEDICARE / Plan: MEDICARE PART A AND B / Product Type: *No Product type* /      Patient  verified Yes     Visit #   Current / Total 5 10   Time   In / Out 1:42 2:21   Pain   In / Out 5 3   Subjective Functional Status/Changes: Pt reports she has been limping more the past few days due to the knee pain.    Changes to:  Meds, Allergies, Med Hx, Sx Hx?  If yes, update Summary List no       TREATMENT AREA =  Left knee pain [M25.562]    OBJECTIVE  Therapeutic Procedures:  Tx Min Billable or 1:1 Min (if diff from Tx Min) Procedure, Rationale, Specifics   31  54810 Therapeutic Exercise (timed):  increase ROM, strength, coordination, balance, and proprioception to improve patient's ability to progress to PLOF and address remaining functional goals. (see flow sheet as applicable)     Details if applicable:       8  79060 Neuromuscular Re-Education (timed):  improve balance, coordination, kinesthetic sense, posture, core stability and proprioception to improve patient's ability to develop conscious control of individual muscles and awareness of position of extremities in order to progress to PLOF and address remaining functional goals. (see flow sheet as applicable)     Details if applicable:       39  [facility] Totals Reminder: bill using total billable min of TIMED therapeutic procedures (example: do not include dry needle or estim unattended, both untimed codes, in totals to left)  8-22 min = 1 unit; 23-37 min = 2 units; 38-52 min = 3 units; 53-67 min = 4 units; 68-82 min = 5 units   Total Total     TOTAL TREATMENT TIME:        39     [x]  Patient Education billed concurrently with other procedures   [x] Review HEP    [] Progressed/Changed HEP, detail:    [] Other detail:       Objective Information/Functional Measures/Assessment  Min EXT lag noted with SLR on B LE. Mild

## 2024-05-01 ENCOUNTER — HOSPITAL ENCOUNTER (OUTPATIENT)
Facility: HOSPITAL | Age: 69
Setting detail: RECURRING SERIES
Discharge: HOME | End: 2024-05-04
Payer: MEDICARE

## 2024-05-01 PROCEDURE — 97112 NEUROMUSCULAR REEDUCATION: CPT

## 2024-05-01 PROCEDURE — 97530 THERAPEUTIC ACTIVITIES: CPT

## 2024-05-01 PROCEDURE — 97110 THERAPEUTIC EXERCISES: CPT

## 2024-05-01 NOTE — PROGRESS NOTES
PHYSICAL / OCCUPATIONAL THERAPY - DAILY TREATMENT NOTE (updated )    Patient Name: Mary Beth Durán    Date: 2024    : 1955  Insurance: Payor: MEDICARE / Plan: MEDICARE PART A AND B / Product Type: *No Product type* /      Patient  verified Yes     Visit #   Current / Total 6 10   Time   In / Out 1:40 2:23   Pain   In / Out 4 3   Subjective Functional Status/Changes: Pt reports pain with stair negotiation.    Changes to:  Meds, Allergies, Med Hx, Sx Hx?  If yes, update Summary List no       TREATMENT AREA =  Left knee pain [M25.562]    OBJECTIVE  Therapeutic Procedures:  Tx Min Billable or 1:1 Min (if diff from Tx Min) Procedure, Rationale, Specifics   12 12 69145 Therapeutic Activity (timed):  use of dynamic activities replicating functional movements to increase ROM, strength, coordination, balance, and proprioception in order to improve patient's ability to progress to PLOF and address remaining functional goals.  (see flow sheet as applicable)     Details if applicable:   stair negotiation with pt education on how to descend/ascend stairs with no excessive ER of the left LE to avoid increased pain   19 17 49529 Therapeutic Exercise (timed):  increase ROM, strength, coordination, balance, and proprioception to improve patient's ability to progress to PLOF and address remaining functional goals. (see flow sheet as applicable)     Details if applicable:       12  88186 Neuromuscular Re-Education (timed):  improve balance, coordination, kinesthetic sense, posture, core stability and proprioception to improve patient's ability to develop conscious control of individual muscles and awareness of position of extremities in order to progress to PLOF and address remaining functional goals. (see flow sheet as applicable)     Details if applicable:       43 41 [facility] Totals Reminder: bill using total billable min of TIMED therapeutic procedures (example: do not include dry needle or estim unattended, both

## 2024-05-08 ENCOUNTER — APPOINTMENT (OUTPATIENT)
Facility: HOSPITAL | Age: 69
End: 2024-05-08
Payer: MEDICARE

## 2024-05-10 ENCOUNTER — HOSPITAL ENCOUNTER (OUTPATIENT)
Facility: HOSPITAL | Age: 69
Setting detail: RECURRING SERIES
Discharge: HOME | End: 2024-05-13
Payer: MEDICARE

## 2024-05-10 PROCEDURE — 97112 NEUROMUSCULAR REEDUCATION: CPT

## 2024-05-10 PROCEDURE — 97140 MANUAL THERAPY 1/> REGIONS: CPT

## 2024-05-10 PROCEDURE — 97110 THERAPEUTIC EXERCISES: CPT

## 2024-05-10 NOTE — PROGRESS NOTES
PHYSICAL / OCCUPATIONAL THERAPY - DAILY TREATMENT NOTE (updated )    Patient Name: Mary Beth Durán    Date: 5/10/2024    : 1955  Insurance: Payor: MEDICARE / Plan: MEDICARE PART A AND B / Product Type: *No Product type* /      Patient  verified Yes     Visit #   Current / Total 7 10   Time   In / Out 1:00 pm 1:48 pm   Pain   In / Out 3/10 0/10   Subjective Functional Status/Changes: Patient reports doing better.   Changes to:  Meds, Allergies, Med Hx, Sx Hx?  If yes, update Summary List no       TREATMENT AREA =  Left knee pain [M25.562]    OBJECTIVE  Therapeutic Procedures:  Tx Min Billable or 1:1 Min (if diff from Tx Min) Procedure, Rationale, Specifics   25 17 14887 Therapeutic Exercise (timed):  increase ROM, strength, coordination, balance, and proprioception to improve patient's ability to progress to PLOF and address remaining functional goals. (see flow sheet as applicable)     Details if applicable:  reassessment     15 15 79259 Neuromuscular Re-Education (timed):  improve balance, coordination, kinesthetic sense, posture, core stability and proprioception to improve patient's ability to develop conscious control of individual muscles and awareness of position of extremities in order to progress to PLOF and address remaining functional goals. (see flow sheet as applicable)     Details if applicable:  30 second sit to stand, stair assessment     8 8 15677 Manual Therapy (timed):  decrease pain, increase ROM, and increase tissue extensibility to improve patient's ability to progress to PLOF and address remaining functional goals.  The manual therapy interventions were performed at a separate and distinct time from the therapeutic activities interventions . (see flow sheet as applicable)     (L) hip passive stretching into abduction     48 40 [facility] Totals Reminder: bill using total billable min of TIMED therapeutic procedures (example: do not include dry needle or estim unattended, both untimed

## 2024-05-10 NOTE — PROGRESS NOTES
[facility] [place]   [address] [place] [address]  Phone: [phone] Fax: [phone]  PROGRESS NOTE  Patient Name: Mary Beth Durán : 1955   Treatment/Medical Diagnosis: Left knee pain [M25.562]   Referral Source:  Payor Amna Bond APRN -*  Payor: MEDICARE / Plan: MEDICARE PART A AND B / Product Type: *No Product type* /      Date of Initial Visit: 3/12/24 Attended Visits: 14 Missed Visits: 0     SUMMARY OF TREATMENT  Pt is a pleasant 69 y.o. female who presents with c/o left knee pain. Treatment has consisted of: therapeutic exercise to improve LE/lumbar strength/mobility; therapeutic activities to improve transfer/lift/carry ability; neuromuscular re-education to improve balance, core stability, neuromuscular control with lifting; physical agent/modality for symptom management; manual therapy for symptom relief and muscle flexibility; patient education to improve symptom management; self Care training; home safety training; stair training, and functional mobility training.    CURRENT STATUS  Patient has attended 14 out of 14 sessions from 3/12/24-5/10/24, making steady progress in PT. 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 (standing for longer durations), some increased ease with light housework  Subjective Deficits: increased knee pain with increased activity (fatigue), floor transfers, kneeling  % improvement: 75%  Pain   Average: 1-2/10                  Best: 1/10                Worst: 4/10  Patient Goal: \"to be able to be able to walk my dog without the fear of falling\"     Objective Measures:   Knee AROM:  Left Knee 3-130 deg  Right Knee 0-132 deg     Strength:    Right (/5) Left (/5)   Hip     Flexion 5 5             Abduction 4+   within available ROM 4+ within available ROM             Extension 4-  within available ROM 4-  within available

## 2024-05-14 ENCOUNTER — APPOINTMENT (OUTPATIENT)
Facility: HOSPITAL | Age: 69
End: 2024-05-14
Payer: MEDICARE

## 2024-05-17 ENCOUNTER — HOSPITAL ENCOUNTER (OUTPATIENT)
Facility: HOSPITAL | Age: 69
Setting detail: RECURRING SERIES
Discharge: HOME | End: 2024-05-20
Payer: MEDICARE

## 2024-05-17 PROCEDURE — 97112 NEUROMUSCULAR REEDUCATION: CPT

## 2024-05-17 PROCEDURE — 97110 THERAPEUTIC EXERCISES: CPT

## 2024-05-17 PROCEDURE — 97530 THERAPEUTIC ACTIVITIES: CPT

## 2024-05-17 NOTE — PROGRESS NOTES
PHYSICAL / OCCUPATIONAL THERAPY - DAILY TREATMENT NOTE (updated )    Patient Name: Mary Beth Durán    Date: 2024    : 1955  Insurance: Payor: MEDICARE / Plan: MEDICARE PART A AND B / Product Type: *No Product type* /      Patient  verified yes     Visit #   Current / Total 1 10 Total Time   Time   In / Out 1:00 1:50 50   Pain   In / Out 0 0    Subjective Functional Status/Changes: I am feeling stiff today.     TREATMENT AREA =  Left knee pain [M25.562]    OBJECTIVE    Therapeutic Procedures:  50  Total 41  Total [facility] BC Totals Reminder: bill using total billable min of TIMED therapeutic procedures (example: do not include dry needle or estim unattended, both untimed codes, in totals to left)  8-22 min = 1 unit; 23-37 min = 2 units; 38-52 min = 3 units; 53-67 min = 4 units; 68-82 min = 5 units   Tx Min Billable or 1:1 Min (if diff from Tx Min) Procedure, Rationale, Specifics   20 11 42876 Therapeutic Exercise (timed):  increase ROM, strength, coordination, balance, and proprioception to improve patient's ability to progress to PLOF and address remaining functional goals. (see flow sheet as applicable)   Details if applicable:       15 15 15036 Therapeutic Activity (timed):  use of dynamic activities replicating functional movements to increase ROM, strength, coordination, balance, and proprioception in order to improve patient's ability to progress to PLOF and address remaining functional goals.  (see flow sheet as applicable)   Details if applicable:       15 15 37886 Neuromuscular Re-Education (timed):  improve balance, coordination, kinesthetic sense, posture, core stability and proprioception to improve patient's ability to develop conscious control of individual muscles and awareness of position of extremities in order to progress to PLOF and address remaining functional goals. (see flow sheet as applicable)     Details if applicable:           [x]  Patient Education billed concurrently with other

## 2024-05-21 ENCOUNTER — APPOINTMENT (OUTPATIENT)
Facility: HOSPITAL | Age: 69
End: 2024-05-21
Payer: MEDICARE

## 2024-05-23 ENCOUNTER — APPOINTMENT (OUTPATIENT)
Facility: HOSPITAL | Age: 69
End: 2024-05-23
Payer: MEDICARE

## 2024-05-28 ENCOUNTER — APPOINTMENT (OUTPATIENT)
Facility: HOSPITAL | Age: 69
End: 2024-05-28
Payer: MEDICARE

## 2024-05-30 ENCOUNTER — APPOINTMENT (OUTPATIENT)
Facility: HOSPITAL | Age: 69
End: 2024-05-30
Payer: MEDICARE

## 2024-06-14 ENCOUNTER — HOSPITAL ENCOUNTER (OUTPATIENT)
Facility: HOSPITAL | Age: 69
Setting detail: RECURRING SERIES
End: 2024-06-14
Payer: MEDICARE

## 2024-06-14 PROCEDURE — 97110 THERAPEUTIC EXERCISES: CPT

## 2024-06-14 PROCEDURE — 97530 THERAPEUTIC ACTIVITIES: CPT

## 2024-06-14 NOTE — PROGRESS NOTES
PHYSICAL / OCCUPATIONAL THERAPY - DAILY TREATMENT NOTE (updated )    Patient Name: Mary Beth Durán    Date: 2024    : 1955  Insurance: Payor: MEDICARE / Plan: MEDICARE PART A AND B / Product Type: *No Product type* /      Patient  verified yes     Visit #   Current / Total 2 10 Total Time   Time   In / Out 1:00 1:45 45   Pain   In / Out 2 0    Subjective Functional Status/Changes: I am feeling better after my travels.     TREATMENT AREA =  Left knee pain [M25.562]    OBJECTIVE    Therapeutic Procedures:  45  Total 40  Total  BC Totals Reminder: bill using total billable min of TIMED therapeutic procedures (example: do not include dry needle or estim unattended, both untimed codes, in totals to left)  8-22 min = 1 unit; 23-37 min = 2 units; 38-52 min = 3 units; 53-67 min = 4 units; 68-82 min = 5 units   Tx Min Billable or 1:1 Min (if diff from Tx Min) Procedure, Rationale, Specifics   20 15 62351 Therapeutic Exercise (timed):  increase ROM, strength, coordination, balance, and proprioception to improve patient's ability to progress to PLOF and address remaining functional goals. (see flow sheet as applicable)   Details if applicable:       07 80997 Therapeutic Activity (timed):  use of dynamic activities replicating functional movements to increase ROM, strength, coordination, balance, and proprioception in order to improve patient's ability to progress to PLOF and address remaining functional goals.  (see flow sheet as applicable)   Details if applicable:  progress toward goals, updated HEP, discharge planning         [x]  Patient Education billed concurrently with other procedures   [x] Review HEP    [] Progressed/Changed HEP, detail:    [] Other detail:       Objective Information/Functional Measures/Assessment    Patient has attended PT for 16 sessions for the treatment of left knee pain.  The patient demonstrates continued slow, steady progress at this time. She reports much improved pain

## 2024-06-14 NOTE — PROGRESS NOTES
Physical Therapy Discharge Instructions  [facility]   [address] [address] [phone] fax    Patient: Mary Beth Durán  : 1955    Continue Home Exercise Program regularly. Mobility exercises can be performed daily. Strengthening exercises can be performed 2-3x/week.     Continue with    [] Ice  as needed   [x] Heat         Follow up with MD:     [] Upon completion of therapy  [x] As needed    Recommendations:     [x]   Return to activity with home program  []   Return to activity with the following modifications:     []Post Rehab Program  []Join Independent aquatic program   []Return to/join local gym    Additional Comments: Mary Beth, thank you for your hard work during your therapy sessions and congratulations on your progress! It has been a pleasure working with you. Thanks for the shenanigans in therapy, I hope the exercises did not bamboozle you. Don't lollygag with your home program or else you will get all cattywampus again. Please reach out in the future if you have any questions.     Patel Wu, PT 2024 1:44 PM

## 2024-06-19 NOTE — PROGRESS NOTES
[facility] [place]   [address] [place] [id number]  Phone: [phone] Fax [phone]  DISCHARGE SUMMARY  Patient Name: Mary Beth Durán : 1955   Treatment/Medical Diagnosis: Left knee pain [M25.562]   Referral Source: Amna Bond APRN -*     Date of Initial Visit: 3/12/24 Attended Visits: 16 Missed Visits: 1     SUMMARY OF TREATMENT  Pt is a pleasant 69 y.o. female who presents with c/o left knee pain. Treatment has consisted of: therapeutic exercise to improve LE/lumbar strength/mobility; therapeutic activities to improve transfer/lift/carry ability; neuromuscular re-education to improve balance, core stability, neuromuscular control with lifting; physical agent/modality for symptom management; manual therapy for symptom relief and muscle flexibility; patient education to improve symptom management; self Care training; home safety training; stair training, and functional mobility training.     CURRENT STATUS  Patient has attended PT for 16 sessions for the treatment of left knee pain.  The patient demonstrates continued slow, steady progress at this time. She reports much improved pain levels and improved LE strength. Her continued functional deficit is knee stiffness/tightness with prolonged activity. Additional assessment includes:  Subjective Gains: improved balance, improved ability to walk dog without fear of falling, improving sitting tolerance with prolonged driving, improved leg strength  Subjective Deficits: constant tightness in knee  Pain   Average: 1-2/10                  Best: 0/10                Worst: 4/10  Objective Measures:   Knee AAROM:  Left Knee 4-135 deg     Strength:    Right (/5) Left (/5)   Hip     Flexion 5 5             Abduction 5 5             Extension 5 4+   Knee   Extension 5 5              Flexion 5 5   Ankle   Dorsiflexion 5 5      - Goal: Pt to demo left knee AAROM of 2-130 deg to improve ease with

## 2024-08-01 ENCOUNTER — HOSPITAL ENCOUNTER (OUTPATIENT)
Facility: HOSPITAL | Age: 69
Setting detail: SPECIMEN
Discharge: HOME | End: 2024-08-01
Payer: MEDICARE

## 2024-08-01 DIAGNOSIS — E78.00 ELEVATED LDL CHOLESTEROL LEVEL: ICD-10-CM

## 2024-08-01 LAB
CHOLEST SERPL-MCNC: 222 MG/DL
HDLC SERPL-MCNC: 61 MG/DL (ref 40–60)
HDLC SERPL: 3.6 (ref 0–5)
LDLC SERPL CALC-MCNC: 134 MG/DL (ref 0–100)
LIPID PANEL: ABNORMAL
TRIGL SERPL-MCNC: 135 MG/DL
VLDLC SERPL CALC-MCNC: 27 MG/DL

## 2024-08-01 PROCEDURE — 36415 COLL VENOUS BLD VENIPUNCTURE: CPT

## 2024-08-01 PROCEDURE — 80061 LIPID PANEL: CPT

## 2024-08-06 ENCOUNTER — OFFICE VISIT (OUTPATIENT)
Facility: CLINIC | Age: 69
End: 2024-08-06

## 2024-08-06 VITALS
OXYGEN SATURATION: 96 % | SYSTOLIC BLOOD PRESSURE: 103 MMHG | HEART RATE: 66 BPM | RESPIRATION RATE: 18 BRPM | TEMPERATURE: 97.6 F | HEIGHT: 67 IN | DIASTOLIC BLOOD PRESSURE: 66 MMHG | BODY MASS INDEX: 30.45 KG/M2 | WEIGHT: 194 LBS

## 2024-08-06 DIAGNOSIS — Z13.228 SCREENING FOR METABOLIC DISORDER: ICD-10-CM

## 2024-08-06 DIAGNOSIS — Z13.89 SCREENING FOR BLOOD OR PROTEIN IN URINE: ICD-10-CM

## 2024-08-06 DIAGNOSIS — E78.00 ELEVATED LDL CHOLESTEROL LEVEL: ICD-10-CM

## 2024-08-06 DIAGNOSIS — Z13.1 SCREENING FOR DIABETES MELLITUS (DM): ICD-10-CM

## 2024-08-06 DIAGNOSIS — Z00.00 ANNUAL PHYSICAL EXAM: Primary | ICD-10-CM

## 2024-08-06 DIAGNOSIS — Z13.0 SCREENING FOR DEFICIENCY ANEMIA: ICD-10-CM

## 2024-08-06 DIAGNOSIS — Z13.29 SCREENING FOR THYROID DISORDER: ICD-10-CM

## 2024-08-06 DIAGNOSIS — E66.09 CLASS 1 OBESITY DUE TO EXCESS CALORIES WITHOUT SERIOUS COMORBIDITY WITH BODY MASS INDEX (BMI) OF 30.0 TO 30.9 IN ADULT: ICD-10-CM

## 2024-08-06 PROBLEM — J96.01 ACUTE HYPOXEMIC RESPIRATORY FAILURE DUE TO COVID-19 (HCC): Status: RESOLVED | Noted: 2020-12-20 | Resolved: 2024-08-06

## 2024-08-06 PROBLEM — U07.1 ACUTE HYPOXEMIC RESPIRATORY FAILURE DUE TO COVID-19 (HCC): Status: RESOLVED | Noted: 2020-12-20 | Resolved: 2024-08-06

## 2024-08-06 RX ORDER — LANOLIN ALCOHOL/MO/W.PET/CERES
250 CREAM (GRAM) TOPICAL NIGHTLY
Qty: 30 CAPSULE | Refills: 3
Start: 2024-08-06 | End: 2025-08-06

## 2024-08-06 SDOH — ECONOMIC STABILITY: FOOD INSECURITY: WITHIN THE PAST 12 MONTHS, THE FOOD YOU BOUGHT JUST DIDN'T LAST AND YOU DIDN'T HAVE MONEY TO GET MORE.: NEVER TRUE

## 2024-08-06 SDOH — ECONOMIC STABILITY: FOOD INSECURITY: WITHIN THE PAST 12 MONTHS, YOU WORRIED THAT YOUR FOOD WOULD RUN OUT BEFORE YOU GOT MONEY TO BUY MORE.: NEVER TRUE

## 2024-08-06 SDOH — ECONOMIC STABILITY: INCOME INSECURITY: HOW HARD IS IT FOR YOU TO PAY FOR THE VERY BASICS LIKE FOOD, HOUSING, MEDICAL CARE, AND HEATING?: NOT VERY HARD

## 2024-08-06 ASSESSMENT — PATIENT HEALTH QUESTIONNAIRE - PHQ9
SUM OF ALL RESPONSES TO PHQ QUESTIONS 1-9: 0
2. FEELING DOWN, DEPRESSED OR HOPELESS: NOT AT ALL
SUM OF ALL RESPONSES TO PHQ QUESTIONS 1-9: 0
1. LITTLE INTEREST OR PLEASURE IN DOING THINGS: NOT AT ALL
SUM OF ALL RESPONSES TO PHQ QUESTIONS 1-9: 0
SUM OF ALL RESPONSES TO PHQ9 QUESTIONS 1 & 2: 0
SUM OF ALL RESPONSES TO PHQ QUESTIONS 1-9: 0

## 2024-08-06 ASSESSMENT — VISUAL ACUITY
OD_CC: 20/15
OS_CC: 20/13

## 2024-08-06 NOTE — PROGRESS NOTES
\"Have you been to the ER, urgent care clinic since your last visit?  Hospitalized since your last visit?\" no    2. \"Have you seen or consulted any other health care providers outside of the [facility] since your last visit?\" no    3. For patients aged 45-75: Has the patient had a colonoscopy / FIT/ Cologuard? yes    If the patient is female:    4. For patients aged 40-74: Has the patient had a mammogram within the past 2 years? yes    5. For patients aged 21-65: Has the patient had a pap smear? N/A    Health Maintenance: reviewed and discussed and ordered per Provider.    Health Maintenance Due   Topic Date Due    Hepatitis B vaccine (3 of 3 - Hep B Twinrix 3-dose series) 04/08/2009    Respiratory Syncytial Virus (RSV) Pregnant or age 60 yrs+ (1 - 1-dose 60+ series) Never done    Shingles vaccine (3 of 3) 06/26/2023    COVID-19 Vaccine (2 - 2023-24 season) 09/01/2023    Flu vaccine (1) 08/01/2024        -Poppy Bolanos CMA  [facility]  Phone: [phone]  Fax: [phone]

## 2024-08-06 NOTE — PROGRESS NOTES
polydipsia  : no urinary  Skin: none   Psychological: negative for - anxiety, depression, sleeps issues    Prior to Admission medications    Medication Sig Start Date End Date Taking? Authorizing Provider   niacin 250 MG extended release capsule Take 1 capsule by mouth nightly 8/6/24 8/6/25 Yes Amna Bond APRN - CNP   Cholecalciferol (VITAMIN D) 50 MCG (2000 UT) CAPS capsule Take 2,000 Units by mouth daily 2/16/23  Yes Amna Bond APRN - CNP   Calcium-Phosphorus-Vitamin D (CITRACAL +D3) 250-107-500 MG-MG-UNIT CHEW Take 2 tablets by mouth in the morning and at bedtime 2/16/23  Yes Amna Bond APRN - CNP   Acetaminophen (TYLENOL DISSOLVE PACKS) 500 MG PACK Take by mouth   Yes Automatic Reconciliation, Ar   diphenhydrAMINE (BENADRYL) 25 MG capsule Take 1 capsule by mouth   Yes Automatic Reconciliation, Ar      No results found.    No results found for this visit on 08/06/24.     Physical Exam  Patient appears well, she is pleasant, alert, oriented x 3, in no distress. obese  ENT normal.  Neck supple. No adenopathy or thyromegaly. MARY.   Lungs are clear, good air entry, no wheezes  Cardiovascular, S1 and S2 normal, no murmurs, regular rate and rhythm.   Abdomen is soft without tenderness, guarding  /Anorectal, deferred.  Muscleskeletal, no swelling  Extremities show no edema  Neurological is normal without focal findings.   Skin: no concerning lesions.    Psych: normal affect.  Mood good.  Oriented x 3.      Vitals:    08/06/24 0835   BP: 103/66   Site: Left Upper Arm   Position: Sitting   Cuff Size: Large Adult   Pulse: 66   Resp: 18   Temp: 97.6 °F (36.4 °C)   SpO2: 96%   Weight: 88 kg (194 lb)   Height: 1.702 m (5' 7\")            On this date 08/06/24  have spent 30 minutes reviewing previous notes, test results and face to face with the patient discussing the diagnosis and importance of compliance with the treatment plan as well as documenting on the day of the visit.      THEODORE Arias

## 2024-10-01 ENCOUNTER — OFFICE VISIT (OUTPATIENT)
Facility: CLINIC | Age: 69
End: 2024-10-01
Payer: MEDICARE

## 2024-10-01 VITALS
HEIGHT: 67 IN | RESPIRATION RATE: 17 BRPM | TEMPERATURE: 98 F | DIASTOLIC BLOOD PRESSURE: 59 MMHG | HEART RATE: 77 BPM | OXYGEN SATURATION: 99 % | WEIGHT: 192.8 LBS | BODY MASS INDEX: 30.26 KG/M2 | SYSTOLIC BLOOD PRESSURE: 117 MMHG

## 2024-10-01 DIAGNOSIS — J40 BRONCHITIS: Primary | ICD-10-CM

## 2024-10-01 DIAGNOSIS — S93.491A SPRAIN OF ANTERIOR TALOFIBULAR LIGAMENT OF RIGHT ANKLE, INITIAL ENCOUNTER: ICD-10-CM

## 2024-10-01 PROCEDURE — G8399 PT W/DXA RESULTS DOCUMENT: HCPCS | Performed by: FAMILY MEDICINE

## 2024-10-01 PROCEDURE — G8484 FLU IMMUNIZE NO ADMIN: HCPCS | Performed by: FAMILY MEDICINE

## 2024-10-01 PROCEDURE — 1123F ACP DISCUSS/DSCN MKR DOCD: CPT | Performed by: FAMILY MEDICINE

## 2024-10-01 PROCEDURE — 1090F PRES/ABSN URINE INCON ASSESS: CPT | Performed by: FAMILY MEDICINE

## 2024-10-01 PROCEDURE — 99214 OFFICE O/P EST MOD 30 MIN: CPT | Performed by: FAMILY MEDICINE

## 2024-10-01 PROCEDURE — G8417 CALC BMI ABV UP PARAM F/U: HCPCS | Performed by: FAMILY MEDICINE

## 2024-10-01 PROCEDURE — 3017F COLORECTAL CA SCREEN DOC REV: CPT | Performed by: FAMILY MEDICINE

## 2024-10-01 PROCEDURE — 1036F TOBACCO NON-USER: CPT | Performed by: FAMILY MEDICINE

## 2024-10-01 PROCEDURE — G8427 DOCREV CUR MEDS BY ELIG CLIN: HCPCS | Performed by: FAMILY MEDICINE

## 2024-10-01 RX ORDER — BENZONATATE 200 MG/1
200 CAPSULE ORAL 3 TIMES DAILY PRN
Qty: 30 CAPSULE | Refills: 0 | Status: SHIPPED | OUTPATIENT
Start: 2024-10-01 | End: 2024-10-11

## 2024-10-01 RX ORDER — GUAIFENESIN 600 MG/1
1200 TABLET, EXTENDED RELEASE ORAL 2 TIMES DAILY
Qty: 40 TABLET | Refills: 0 | Status: SHIPPED | OUTPATIENT
Start: 2024-10-01 | End: 2024-10-11

## 2024-10-01 RX ORDER — AZITHROMYCIN 250 MG/1
TABLET, FILM COATED ORAL
Qty: 6 TABLET | Refills: 0 | Status: SHIPPED | OUTPATIENT
Start: 2024-10-01 | End: 2024-10-11

## 2024-10-01 SDOH — ECONOMIC STABILITY: FOOD INSECURITY: WITHIN THE PAST 12 MONTHS, YOU WORRIED THAT YOUR FOOD WOULD RUN OUT BEFORE YOU GOT MONEY TO BUY MORE.: NEVER TRUE

## 2024-10-01 SDOH — ECONOMIC STABILITY: FOOD INSECURITY: WITHIN THE PAST 12 MONTHS, THE FOOD YOU BOUGHT JUST DIDN'T LAST AND YOU DIDN'T HAVE MONEY TO GET MORE.: NEVER TRUE

## 2024-10-01 ASSESSMENT — PATIENT HEALTH QUESTIONNAIRE - PHQ9
2. FEELING DOWN, DEPRESSED OR HOPELESS: NOT AT ALL
SUM OF ALL RESPONSES TO PHQ QUESTIONS 1-9: 0
SUM OF ALL RESPONSES TO PHQ9 QUESTIONS 1 & 2: 0
SUM OF ALL RESPONSES TO PHQ QUESTIONS 1-9: 0
1. LITTLE INTEREST OR PLEASURE IN DOING THINGS: NOT AT ALL
SUM OF ALL RESPONSES TO PHQ QUESTIONS 1-9: 0
SUM OF ALL RESPONSES TO PHQ QUESTIONS 1-9: 0

## 2024-10-01 NOTE — PROGRESS NOTES
Mary Beth Durán is a 69 y.o.  female and presents with    Chief Complaint   Patient presents with    Cough     Subjective:  Ms. Durán has a pmh of pneumonia and pharyngitis presents to the clinic today with 4 day history of dry cough. Nighttime seems to best worst, she will have coughing spasms throughout the night. She recently returned from [place] where she was with two people who had bronchitis. She notes nasal congestion, sore throat, headache. She takes Nyquil and Benadryl with no relief. She also has noticed hoarseness of her voice and increased fatigue. Hot tea seems to help the throat but she has noticed that the cough has gotten worse. She denies fever, shortness of breath on exertion, and changes in bowel habits.          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    Symptomatic menopausal or female climacteric states 11/12/2021    Abnormal liver function test 11/12/2021    Pneumonia due to COVID-19 virus 12/20/2020    Hypokalemia 12/20/2020     Current Outpatient Medications   Medication Sig Dispense Refill    niacin 250 MG extended release capsule Take 1 capsule by mouth nightly 30 capsule 3    Cholecalciferol (VITAMIN D) 50 MCG (2000 UT) CAPS capsule Take 2,000 Units by mouth daily 30 capsule 2    Calcium-Phosphorus-Vitamin D (CITRACAL +D3) 250-107-500 MG-MG-UNIT CHEW Take 2 tablets by mouth in the morning and at bedtime 90 tablet 1    Acetaminophen (TYLENOL DISSOLVE PACKS) 500 MG PACK Take by mouth      diphenhydrAMINE (BENADRYL) 25 MG capsule Take 1 capsule by mouth       No current facility-administered medications for this visit.     Allergies   Allergen Reactions    Statins      Muscle pain    Ibuprofen Other (See Comments)     Nose Bleeding      Past Medical History:   Diagnosis Date    Abnormal LFTs     Eczema     Hearing loss May 2023    Plans for hearing

## 2024-10-01 NOTE — PROGRESS NOTES
Mary Beth Durán is a 69 y.o. presents today for   Chief Complaint   Patient presents with    Cough    bronchitis     Is someone accompanying this pt? no    Is the patient using any DME equipment during OV? no  There were no vitals filed for this visit.    Depression Screenin/6/2024     8:34 AM 2024     9:29 AM 2024     8:43 AM 2023     9:02 AM 10/18/2023     3:14 PM 2023     9:12 AM 2022    10:30 AM   PHQ-9 Questionaire   Little interest or pleasure in doing things 0 0 0 0 0 0 0   Feeling down, depressed, or hopeless 0 0 0 0 0 0 0   Trouble falling or staying asleep, or sleeping too much     2     Feeling tired or having little energy     1     Poor appetite or overeating     0     Feeling bad about yourself - or that you are a failure or have let yourself or your family down     0     Trouble concentrating on things, such as reading the newspaper or watching television     0     Moving or speaking so slowly that other people could have noticed. Or the opposite - being so fidgety or restless that you have been moving around a lot more than usual     0     Thoughts that you would be better off dead, or of hurting yourself in some way     0     PHQ-9 Total Score 0 0 0 0 3 0 0   If you checked off any problems, how difficult have these problems made it for you to do your work, take care of things at home, or get along with other people?     0          Abuse Screening:      10/18/2023     3:00 PM   AMB Abuse Screening   Do you ever feel afraid of your partner? N   Are you in a relationship with someone who physically or mentally threatens you? N   Is it safe for you to go home? Y        Learning Assessment Screening:   No question data found.     Fall Risk Screenin/19/2024     9:29 AM 2023     9:00 AM 10/18/2023     3:10 PM   Fall Risk   Do you feel unsteady or are you worried about falling?  no no no   2 or more falls in past year? no yes no   Fall with injury in past

## 2024-10-30 ENCOUNTER — LAB (OUTPATIENT)
Facility: CLINIC | Age: 69
End: 2024-10-30

## 2024-10-30 ENCOUNTER — HOSPITAL ENCOUNTER (OUTPATIENT)
Facility: HOSPITAL | Age: 69
Setting detail: SPECIMEN
Discharge: HOME | End: 2024-11-02
Payer: MEDICARE

## 2024-10-30 DIAGNOSIS — Z13.89 SCREENING FOR BLOOD OR PROTEIN IN URINE: ICD-10-CM

## 2024-10-30 DIAGNOSIS — E78.00 ELEVATED LDL CHOLESTEROL LEVEL: ICD-10-CM

## 2024-10-30 DIAGNOSIS — Z13.0 SCREENING FOR DEFICIENCY ANEMIA: ICD-10-CM

## 2024-10-30 DIAGNOSIS — Z13.228 SCREENING FOR METABOLIC DISORDER: ICD-10-CM

## 2024-10-30 DIAGNOSIS — Z13.1 SCREENING FOR DIABETES MELLITUS (DM): ICD-10-CM

## 2024-10-30 DIAGNOSIS — Z13.29 SCREENING FOR THYROID DISORDER: ICD-10-CM

## 2024-10-30 LAB
ALBUMIN SERPL-MCNC: 4.4 G/DL (ref 3.4–5)
ALBUMIN/GLOB SERPL: 1.5 (ref 0.8–1.7)
ALP SERPL-CCNC: 119 U/L (ref 45–117)
ALT SERPL-CCNC: 36 U/L (ref 13–56)
ANION GAP SERPL CALC-SCNC: 6 MMOL/L (ref 3–18)
APPEARANCE UR: CLEAR
AST SERPL-CCNC: 26 U/L (ref 10–38)
BACTERIA URNS QL MICRO: ABNORMAL /HPF
BASOPHILS # BLD: 0 K/UL (ref 0–0.1)
BASOPHILS NFR BLD: 1 % (ref 0–2)
BILIRUB SERPL-MCNC: 0.7 MG/DL (ref 0.2–1)
BILIRUB UR QL: NEGATIVE
BUN SERPL-MCNC: 17 MG/DL (ref 7–18)
BUN/CREAT SERPL: 25 (ref 12–20)
CALCIUM SERPL-MCNC: 9.5 MG/DL (ref 8.5–10.1)
CHLORIDE SERPL-SCNC: 105 MMOL/L (ref 100–111)
CHOLEST SERPL-MCNC: 224 MG/DL
CO2 SERPL-SCNC: 29 MMOL/L (ref 21–32)
COLOR UR: YELLOW
CREAT SERPL-MCNC: 0.68 MG/DL (ref 0.6–1.3)
DIFFERENTIAL METHOD BLD: ABNORMAL
EOSINOPHIL # BLD: 0.1 K/UL (ref 0–0.4)
EOSINOPHIL NFR BLD: 3 % (ref 0–5)
EPITH CASTS URNS QL MICRO: ABNORMAL /LPF (ref 0–5)
ERYTHROCYTE [DISTWIDTH] IN BLOOD BY AUTOMATED COUNT: 13 % (ref 11.6–14.5)
EST. AVERAGE GLUCOSE BLD GHB EST-MCNC: 105 MG/DL
GLOBULIN SER CALC-MCNC: 2.9 G/DL (ref 2–4)
GLUCOSE SERPL-MCNC: 90 MG/DL (ref 74–99)
GLUCOSE UR STRIP.AUTO-MCNC: NEGATIVE MG/DL
HBA1C MFR BLD: 5.3 % (ref 4.2–5.6)
HCT VFR BLD AUTO: 40.8 % (ref 35–45)
HDLC SERPL-MCNC: 75 MG/DL (ref 40–60)
HDLC SERPL: 3 (ref 0–5)
HGB BLD-MCNC: 13.2 G/DL (ref 12–16)
HGB UR QL STRIP: NEGATIVE
IMM GRANULOCYTES # BLD AUTO: 0 K/UL (ref 0–0.04)
IMM GRANULOCYTES NFR BLD AUTO: 0 % (ref 0–0.5)
KETONES UR QL STRIP.AUTO: NEGATIVE MG/DL
LDLC SERPL CALC-MCNC: 130.2 MG/DL (ref 0–100)
LEUKOCYTE ESTERASE UR QL STRIP.AUTO: ABNORMAL
LIPID PANEL: ABNORMAL
LYMPHOCYTES # BLD: 1.6 K/UL (ref 0.9–3.6)
LYMPHOCYTES NFR BLD: 36 % (ref 21–52)
MCH RBC QN AUTO: 28.8 PG (ref 24–34)
MCHC RBC AUTO-ENTMCNC: 32.4 G/DL (ref 31–37)
MCV RBC AUTO: 88.9 FL (ref 78–100)
MONOCYTES # BLD: 0.4 K/UL (ref 0.05–1.2)
MONOCYTES NFR BLD: 9 % (ref 3–10)
NEUTS SEG # BLD: 2.2 K/UL (ref 1.8–8)
NEUTS SEG NFR BLD: 51 % (ref 40–73)
NITRITE UR QL STRIP.AUTO: NEGATIVE
NRBC # BLD: 0 K/UL (ref 0–0.01)
NRBC BLD-RTO: 0 PER 100 WBC
PH UR STRIP: 8 (ref 5–8)
PLATELET # BLD AUTO: 290 K/UL (ref 135–420)
PMV BLD AUTO: 9.8 FL (ref 9.2–11.8)
POTASSIUM SERPL-SCNC: 4.4 MMOL/L (ref 3.5–5.5)
PROT SERPL-MCNC: 7.3 G/DL (ref 6.4–8.2)
PROT UR STRIP-MCNC: NEGATIVE MG/DL
RBC # BLD AUTO: 4.59 M/UL (ref 4.2–5.3)
RBC #/AREA URNS HPF: ABNORMAL /HPF (ref 0–5)
SODIUM SERPL-SCNC: 140 MMOL/L (ref 136–145)
SP GR UR REFRACTOMETRY: 1.02 (ref 1–1.03)
T4 FREE SERPL-MCNC: 0.9 NG/DL (ref 0.7–1.5)
TRIGL SERPL-MCNC: 94 MG/DL
TSH SERPL DL<=0.05 MIU/L-ACNC: 1.21 UIU/ML (ref 0.36–3.74)
UROBILINOGEN UR QL STRIP.AUTO: 1 EU/DL (ref 0.2–1)
VLDLC SERPL CALC-MCNC: 18.8 MG/DL
WBC # BLD AUTO: 4.3 K/UL (ref 4.6–13.2)
WBC URNS QL MICRO: ABNORMAL /HPF (ref 0–5)

## 2024-10-30 PROCEDURE — 85025 COMPLETE CBC W/AUTO DIFF WBC: CPT

## 2024-10-30 PROCEDURE — 36415 COLL VENOUS BLD VENIPUNCTURE: CPT

## 2024-10-30 PROCEDURE — 84439 ASSAY OF FREE THYROXINE: CPT

## 2024-10-30 PROCEDURE — 80061 LIPID PANEL: CPT

## 2024-10-30 PROCEDURE — 80053 COMPREHEN METABOLIC PANEL: CPT

## 2024-10-30 PROCEDURE — 81001 URINALYSIS AUTO W/SCOPE: CPT

## 2024-10-30 PROCEDURE — 84443 ASSAY THYROID STIM HORMONE: CPT

## 2024-10-30 PROCEDURE — 83036 HEMOGLOBIN GLYCOSYLATED A1C: CPT

## 2024-11-06 ENCOUNTER — OFFICE VISIT (OUTPATIENT)
Facility: CLINIC | Age: 69
End: 2024-11-06

## 2024-11-06 VITALS
BODY MASS INDEX: 30.13 KG/M2 | HEART RATE: 78 BPM | TEMPERATURE: 97.3 F | OXYGEN SATURATION: 92 % | DIASTOLIC BLOOD PRESSURE: 72 MMHG | SYSTOLIC BLOOD PRESSURE: 107 MMHG | WEIGHT: 192 LBS | HEIGHT: 67 IN

## 2024-11-06 DIAGNOSIS — M25.551 CHRONIC RIGHT HIP PAIN: ICD-10-CM

## 2024-11-06 DIAGNOSIS — G89.29 CHRONIC PAIN OF LEFT KNEE: ICD-10-CM

## 2024-11-06 DIAGNOSIS — Z12.31 ENCOUNTER FOR SCREENING MAMMOGRAM FOR MALIGNANT NEOPLASM OF BREAST: ICD-10-CM

## 2024-11-06 DIAGNOSIS — M62.838 LEG MUSCLE SPASM: ICD-10-CM

## 2024-11-06 DIAGNOSIS — Z00.00 PREVENTATIVE HEALTH CARE: ICD-10-CM

## 2024-11-06 DIAGNOSIS — M85.80 OSTEOPENIA, UNSPECIFIED LOCATION: ICD-10-CM

## 2024-11-06 DIAGNOSIS — M25.562 CHRONIC PAIN OF LEFT KNEE: ICD-10-CM

## 2024-11-06 DIAGNOSIS — Z71.89 ACP (ADVANCE CARE PLANNING): ICD-10-CM

## 2024-11-06 DIAGNOSIS — Z00.00 MEDICARE ANNUAL WELLNESS VISIT, SUBSEQUENT: Primary | ICD-10-CM

## 2024-11-06 DIAGNOSIS — Z23 IMMUNIZATION DUE: ICD-10-CM

## 2024-11-06 DIAGNOSIS — G89.29 CHRONIC RIGHT HIP PAIN: ICD-10-CM

## 2024-11-06 DIAGNOSIS — E78.00 ELEVATED LDL CHOLESTEROL LEVEL: ICD-10-CM

## 2024-11-06 RX ORDER — TIZANIDINE 2 MG/1
2 TABLET ORAL 3 TIMES DAILY PRN
Qty: 30 TABLET | Refills: 0 | Status: SHIPPED | OUTPATIENT
Start: 2024-11-06 | End: 2024-11-10 | Stop reason: SDUPTHER

## 2024-11-06 RX ORDER — MULTIVIT-MIN/IRON/FOLIC ACID/K 18-600-40
4000 CAPSULE ORAL DAILY
Qty: 180 CAPSULE | Refills: 3 | Status: SHIPPED | OUTPATIENT
Start: 2024-11-06 | End: 2024-11-10 | Stop reason: SDUPTHER

## 2024-11-06 RX ORDER — PRENATAL 168/IRON/FOLIC/OMEGA3 27-800-235
2 CAPSULE ORAL 2 TIMES DAILY
Qty: 90 TABLET | Refills: 1 | Status: SHIPPED | OUTPATIENT
Start: 2024-11-06 | End: 2024-11-10 | Stop reason: SDUPTHER

## 2024-11-06 RX ORDER — MAGNESIUM GLYCINATE 100 MG
200 TABLET ORAL NIGHTLY
Qty: 180 TABLET | Refills: 3 | Status: SHIPPED | OUTPATIENT
Start: 2024-11-06 | End: 2024-11-10 | Stop reason: SDUPTHER

## 2024-11-06 ASSESSMENT — PATIENT HEALTH QUESTIONNAIRE - PHQ9
2. FEELING DOWN, DEPRESSED OR HOPELESS: NOT AT ALL
1. LITTLE INTEREST OR PLEASURE IN DOING THINGS: NOT AT ALL
SUM OF ALL RESPONSES TO PHQ QUESTIONS 1-9: 0
SUM OF ALL RESPONSES TO PHQ9 QUESTIONS 1 & 2: 0
SUM OF ALL RESPONSES TO PHQ QUESTIONS 1-9: 0

## 2024-11-06 ASSESSMENT — LIFESTYLE VARIABLES
HOW OFTEN DO YOU HAVE A DRINK CONTAINING ALCOHOL: NEVER
HOW MANY STANDARD DRINKS CONTAINING ALCOHOL DO YOU HAVE ON A TYPICAL DAY: PATIENT DOES NOT DRINK
HOW OFTEN DO YOU HAVE A DRINK CONTAINING ALCOHOL: NEVER
HOW MANY STANDARD DRINKS CONTAINING ALCOHOL DO YOU HAVE ON A TYPICAL DAY: PATIENT DOES NOT DRINK

## 2024-11-06 ASSESSMENT — VISUAL ACUITY
OS_CC: 20/20
OD_CC: 20/20

## 2024-11-06 NOTE — PATIENT INSTRUCTIONS
liability for your use of this information.      Personalized Preventive Plan for Mary Beth Durán - 11/6/2024  Medicare offers a range of preventive health benefits. Some of the tests and screenings are paid in full while other may be subject to a deductible, co-insurance, and/or copay.    Some of these benefits include a comprehensive review of your medical history including lifestyle, illnesses that may run in your family, and various assessments and screenings as appropriate.    After reviewing your medical record and screening and assessments performed today your provider may have ordered immunizations, labs, imaging, and/or referrals for you.  A list of these orders (if applicable) as well as your Preventive Care list are included within your After Visit Summary for your review.    Other Preventive Recommendations:    A preventive eye exam performed by an eye specialist is recommended every 1-2 years to screen for glaucoma; cataracts, macular degeneration, and other eye disorders.  A preventive dental visit is recommended every 6 months.  Try to get at least 150 minutes of exercise per week or 10,000 steps per day on a pedometer .  Order or download the FREE \"Exercise & Physical Activity: Your Everyday Guide\" from [employer]. Call 1-928.503.2019 or search [employer] online.  You need 8154-8798 mg of calcium and 6417-0443 IU of vitamin D per day. It is possible to meet your calcium requirement with diet alone, but a vitamin D supplement is usually necessary to meet this goal.  When exposed to the sun, use a sunscreen that protects against both UVA and UVB radiation with an SPF of 30 or greater. Reapply every 2 to 3 hours or after sweating, drying off with a towel, or swimming.  Always wear a seat belt when traveling in a car. Always wear a helmet when riding a bicycle or motorcycle.

## 2024-11-06 NOTE — ACP (ADVANCE CARE PLANNING)
Advance Care Planning   General Advance Care Planning (ACP) Conversation    Date of Conversation: 11/6/2024  Conducted with: Patient with Decision Making Capacity  Other persons present: None    Healthcare Decision Maker:    Primary Decision Maker: Panchito Durán - [facility] - [phone]    Today we documented Decision Maker(s) consistent with Legal Next of Kin hierarchy.  Content/Action Overview:  Has ACP document(s) NOT on file - requested patient to provide  Reviewed DNR/DNI and patient elects Full Code (Attempt Resuscitation)  treatment goals, artificial nutrition, ventilation preferences, and resuscitation preferences  Yes to resuscitation and family will make decisions about life support.      Length of Voluntary ACP Conversation in minutes:  <16 minutes (Non-Billable)    NORMA SANCHEZ, APRN - CNP

## 2024-11-06 NOTE — PROGRESS NOTES
Mary Beth Durán is a 69 y.o. year old female who presents today for No chief complaint on file.       \"Have you been to the ER, urgent care clinic since your last visit?  Hospitalized since your last visit?\"   NO     “Have you seen or consulted any other health care providers outside our system since your last visit?”   NO           After obtaining verbal consent from Mary Beth Durán  , patient/guardian signed immunization consent form to receive the flu vaccine. Most current VIS given to the patient to review before administration. All questions were answered. Patient tolerated immunization(s) well with no adverse reactions.   - DAMI Murphy  [facility]  Phone: [phone]  Fax: [phone]  
Counseled on the importance of adherence to annual lung cancer LDCT screening, impact of comorbidities, ability and willingness to undergo diagnosis and treatment. Counseled on the importance of maintaining cigarette smoking abstinence and cessation. The patient has a history of >20 pack years and is either still smoking or quit within the last 15 years. There are no signs or symptoms of lung cancer.    CV Risk Counseling:  Patient was asked about her current diet and exercise habits, and personalized advice was provided regarding recommended lifestyle changes. Patient's individual cardiovascular disease risk factors, including advanced age (> 55 for men, > 65 for women), obesity (BMI >= 30), and sedentary lifestyle, were discussed, as well as the likely benefits of lifestyle changes. Based upon patient's motivation to change her behavior, the following plan was agreed upon to work toward lowering cardiovascular disease risk: Mediterranean diet and increase physical activity, as tolerated.  Aspirin use for primary prevention of cardiovascular disease for men 45-79 and women 55-79: Not indicated. Educational materials for lifestyle changes were provided. Patient will follow-up in 6 month(s) with PCP. Provider spent 5 minutes counseling patient.            Objective    Patient-Reported Vitals  No data recorded              Allergies   Allergen Reactions    Statins      Muscle pain    Ibuprofen Other (See Comments)     Nose Bleeding      Prior to Visit Medications    Medication Sig Taking? Authorizing Provider   tiZANidine (ZANAFLEX) 2 MG tablet Take 1 tablet by mouth 3 times daily as needed (muscle spasm) Yes Amna Bond APRN - CNP   Magnesium Bisglycinate (MAG GLYCINATE) 100 MG TABS Take 200 mg by mouth at bedtime Yes Amna Bond APRN - CNP   vitamin D 50 MCG (2000 UT) CAPS capsule Take 2 capsules by mouth daily Yes Amna Bond APRN - CNP   Calcium-Phosphorus-Vitamin D (CITRACAL +D3) 250-107-500

## 2024-11-07 ENCOUNTER — TELEPHONE (OUTPATIENT)
Facility: CLINIC | Age: 69
End: 2024-11-07

## 2024-11-07 DIAGNOSIS — Z00.00 PREVENTATIVE HEALTH CARE: Primary | ICD-10-CM

## 2024-11-07 DIAGNOSIS — M85.80 OSTEOPENIA, UNSPECIFIED LOCATION: ICD-10-CM

## 2024-11-07 DIAGNOSIS — M62.838 LEG MUSCLE SPASM: ICD-10-CM

## 2024-11-07 NOTE — TELEPHONE ENCOUNTER
please refill meds to correct pharmacy     Pharmacy has been [facility] to [facility]      tiZANidine (ZANAFLEX) 2 MG tablet    Magnesium Bisglycinate (MAG GLYCINATE) 100 MG TABS       vitamin D 50 MCG (2000 UT) CAPS capsule     Calcium-Phosphorus-Vitamin D (CITRACAL +D3) 250-107-500 MG-MG-UNIT CHEW     Please advise    Thank you

## 2024-11-10 RX ORDER — MULTIVIT-MIN/IRON/FOLIC ACID/K 18-600-40
4000 CAPSULE ORAL DAILY
Qty: 180 CAPSULE | Refills: 3 | Status: SHIPPED | OUTPATIENT
Start: 2024-11-10

## 2024-11-10 RX ORDER — MAGNESIUM GLYCINATE 100 MG
200 TABLET ORAL NIGHTLY
Qty: 180 TABLET | Refills: 3 | Status: SHIPPED | OUTPATIENT
Start: 2024-11-10

## 2024-11-10 RX ORDER — PRENATAL 168/IRON/FOLIC/OMEGA3 27-800-235
2 CAPSULE ORAL 2 TIMES DAILY
Qty: 90 TABLET | Refills: 1 | Status: SHIPPED | OUTPATIENT
Start: 2024-11-10

## 2024-11-10 RX ORDER — TIZANIDINE 2 MG/1
2 TABLET ORAL 3 TIMES DAILY PRN
Qty: 30 TABLET | Refills: 0 | Status: SHIPPED | OUTPATIENT
Start: 2024-11-10

## 2024-12-09 ENCOUNTER — TELEPHONE (OUTPATIENT)
Facility: CLINIC | Age: 69
End: 2024-12-09

## 2024-12-09 DIAGNOSIS — M62.838 LEG MUSCLE SPASM: ICD-10-CM

## 2024-12-09 RX ORDER — TIZANIDINE 2 MG/1
2 TABLET ORAL 3 TIMES DAILY PRN
Qty: 30 TABLET | Refills: 0 | Status: CANCELLED | OUTPATIENT
Start: 2024-12-09

## 2024-12-09 NOTE — TELEPHONE ENCOUNTER
Patient called in stating she was seen by her bone doctor. They're recommending that she see her NP for a referral for Hematology. Reason being is that patient can't take Ibuprofen. They're recommending patient get a referral. Also, patient verified that [facility] does accept her insurance.    Patient also requesting medication refill for the following:     Tizanidine (ZANAFLEX) 2 MG tablet     LOV: 11/6/24   NOV: 2/6/25     Please be advised, thank you.

## 2024-12-19 ENCOUNTER — TELEMEDICINE (OUTPATIENT)
Facility: CLINIC | Age: 69
End: 2024-12-19

## 2024-12-19 DIAGNOSIS — R58 BLEEDING: Primary | ICD-10-CM

## 2024-12-19 DIAGNOSIS — M62.838 LEG MUSCLE SPASM: ICD-10-CM

## 2024-12-19 DIAGNOSIS — Z86.16 HISTORY OF COVID-19: ICD-10-CM

## 2024-12-19 RX ORDER — TIZANIDINE 2 MG/1
2 TABLET ORAL NIGHTLY PRN
Qty: 90 TABLET | Refills: 1 | Status: SHIPPED | OUTPATIENT
Start: 2024-12-19

## 2024-12-19 NOTE — PROGRESS NOTES
jaundice and patient does not appear pale.  Psych: normal affect.  Mood good.  Oriented x 3.  Judgement and insight intact.       There were no vitals filed for this visit.         On this date 12/19/24  have spent 30 minutes reviewing previous notes, test results and virtual with the patient discussing the diagnosis and importance of compliance with the treatment plan as well as documenting on the day of the visit.      Amna Bond, LORELEIC

## 2024-12-23 ENCOUNTER — HOSPITAL ENCOUNTER (OUTPATIENT)
Facility: HOSPITAL | Age: 69
Setting detail: RECURRING SERIES
Discharge: HOME | End: 2024-12-26
Payer: MEDICARE

## 2024-12-23 PROCEDURE — 97530 THERAPEUTIC ACTIVITIES: CPT

## 2024-12-23 PROCEDURE — 97110 THERAPEUTIC EXERCISES: CPT

## 2024-12-23 PROCEDURE — 97161 PT EVAL LOW COMPLEX 20 MIN: CPT

## 2024-12-23 NOTE — PROGRESS NOTES
PT DAILY TREATMENT NOTE/LUMBAR EVAL     Patient Name: Mary Beth Durán    Date: 2024    : 1955  Insurance: Payor: MEDICARE / Plan: MEDICARE PART A AND B / Product Type: *No Product type* /      Patient  verified yes     Visit #   Current / Total 1 10   Time   In / Out 9:45 10:35   Pain   In / Out 0 0   Subjective Functional Status/Changes: See POC   Changes to:  Meds, Allergies, Med Hx, Sx Hx?  If yes, update Summary List yes, see POC     Treatment Area: Left knee pain [M25.562]    SUBJECTIVE    CC: right hip pain  History/Mechanism of Injury: insidious onset with repeated long car rides  Current Symptoms/Complaints:   Right lateral hip pain- dull pain/ache   Right inner groin pain- electric shock in groin  Dull pain in lower back with prolonged sitting  Pain-  Current: 0/10     Worst: 8/10   Best: 0/10  Aggravated By: getting in/out of car, sitting for prolonged periods, walking long distances  Alleviated By: Tylenol  Previous Treatment/Compliance: previous positive response to left knee rehab  PMHx/Surgical Hx: HLD, hx of left knee pain, pending MRI on lumbar spine  Living Situation: lives in 2 story home  Hobbies: decorating, crafting  PLOF: functionally independent  Limitations to PLOF: increased difficulty with prolonged standing, prolonged sitting,   Pt Goals: less pain    OBJECTIVE/EXAMINATION    25 min [x]Eval  - untimed                 Therapeutic Procedures:  Tx Min Billable or 1:1 Min (if diff from Tx Min) Procedure, Rationale, Specifics   10 10 25457 Therapeutic Exercise (timed):  increase ROM, strength, coordination, balance, and proprioception to improve patient's ability to progress to PLOF and address remaining functional goals. (see flow sheet as applicable)     Details if applicable:     15 15 56857 Therapeutic Activity (timed):  use of dynamic activities replicating functional movements to increase ROM, strength, coordination, balance, and proprioception in order to improve 
Pt to demonstrate at least 4/5 right hip extension MMT to improve hip stability during stance phase of gait.   Status at last note/certification: 3+/5  - Goal: Pt to report no more than 3/10 pain at worst in right hip to improve ease with prolonged standing.  Status at last note/certification: 8/10 pain at worst  - Goal: Patient will improve LEFS score to at least 42 pts to demonstrate increased functional mobility.  Status at last note/certification: 24 pts     Frequency / Duration: Patient would benefit from skilled PT 2 times per week for up to 24 VISITS sessions as needed in this certification period.  Goals will be assigned and reassessed every 10 visits/ 30 days per guidelines .    Patient/ Caregiver education and instruction: Diagnosis, prognosis, self care, activity modification, and exercises [x]  Plan of care has been reviewed with PTA    Certification Period: 12/23/24-3/21/25    Patel Wu, PT       12/23/2024       10:39 AM  ===================================================================  I certify that the above Therapy Services are being furnished while the patient is under my care. I agree with the treatment plan and certify that this therapy is necessary.    Physician's Signature:_________________________   DATE:_________   TIME:________                           Amna Bond APRN -*    ** Signature, Date and Time must be completed for valid certification **  [facility] or you may fax the signed copy to [phone].  Thank you.

## 2024-12-26 ENCOUNTER — HOSPITAL ENCOUNTER (OUTPATIENT)
Facility: HOSPITAL | Age: 69
Setting detail: RECURRING SERIES
Discharge: HOME | End: 2024-12-29
Payer: MEDICARE

## 2024-12-26 PROCEDURE — 97110 THERAPEUTIC EXERCISES: CPT

## 2024-12-26 PROCEDURE — 97112 NEUROMUSCULAR REEDUCATION: CPT

## 2024-12-26 NOTE — PROGRESS NOTES
stance phase of gait.   Status at last note/certification: 3+/5  - Goal: Pt to report no more than 3/10 pain at worst in right hip to improve ease with prolonged standing.  Status at last note/certification: 8/10 pain at worst  - Goal: Patient will improve LEFS score to at least 42 pts to demonstrate increased functional mobility.  Status at last note/certification: 24 pts     PN due 1/23/25  RC due 3/21/25  ARSLAN    PLAN  [x] Continue plan of care  [x]  Upgrade activities as tolerated  []  Discharge due to :  []  Other:    Patel Wu, PT    12/26/2024    6:52 AM    If an interpreting service was utilized for treatment of this patient, the contents of this document represent the material reviewed with the patient via the .     Future Appointments   Date Time Provider Department Center   12/26/2024  7:00 AM [facility]   12/30/2024  9:40 AM Kelly Ho [facility]   1/3/2025  1:00 PM [facility]   1/27/2025  8:45 AM VARSHA NIXON BX RM 1 [facility]   2/6/2025  8:30 AM Amna Bond APRN - CNP [facility] DEP

## 2024-12-30 ENCOUNTER — HOSPITAL ENCOUNTER (OUTPATIENT)
Facility: HOSPITAL | Age: 69
Setting detail: RECURRING SERIES
Discharge: HOME | End: 2025-01-02
Payer: MEDICARE

## 2024-12-30 DIAGNOSIS — R05.1 ACUTE COUGH: Primary | ICD-10-CM

## 2024-12-30 PROCEDURE — 97112 NEUROMUSCULAR REEDUCATION: CPT

## 2024-12-30 PROCEDURE — 97530 THERAPEUTIC ACTIVITIES: CPT

## 2024-12-30 PROCEDURE — 97110 THERAPEUTIC EXERCISES: CPT

## 2024-12-30 RX ORDER — BENZONATATE 100 MG/1
100 CAPSULE ORAL 3 TIMES DAILY PRN
Qty: 21 CAPSULE | Refills: 0 | Status: SHIPPED | OUTPATIENT
Start: 2024-12-30 | End: 2025-01-06

## 2024-12-30 NOTE — PROGRESS NOTES
PHYSICAL / OCCUPATIONAL THERAPY - DAILY TREATMENT NOTE (updated )    Patient Name: Mary Beth Durán    Date: 2024    : 1955  Insurance: Payor: MEDICARE / Plan: MEDICARE PART A AND B / Product Type: *No Product type* /      Patient  verified yes     Visit #   Current / Total 3 10 Total Time   Time   In / Out 9:40 AM  10:22 AM 42   Pain   In / Out 3 2    Subjective Functional Status/Changes: \"I have a cold.\"      TREATMENT AREA =  Right hip pain [M25.551]    OBJECTIVE    Therapeutic Procedures:    Total  42   Total  38 [facility] Totals Reminder: bill using total billable min of TIMED therapeutic procedures (example: do not include dry needle or estim unattended, both untimed codes, in totals to left)  8-22 min = 1 unit; 23-37 min = 2 units; 38-52 min = 3 units; 53-67 min = 4 units; 68-82 min = 5 units   Tx Min Billable or 1:1 Min (if diff from Tx Min) Procedure, Rationale, Specifics   16 12 22199 Therapeutic Exercise (timed):  increase ROM, strength, coordination, balance, and proprioception to improve patient's ability to progress to PLOF and address remaining functional goals. (see flow sheet as applicable)     Details if applicable:  stretches, strengthening, nu step      15 15 79636 Therapeutic Activity (timed):  use of dynamic activities replicating functional movements to increase ROM, strength, coordination, balance, and proprioception in order to improve patient's ability to progress to PLOF and address remaining functional goals.  (see flow sheet as applicable)      Details if applicable: patient education, functional movements      11 11 50018 Neuromuscular Re-Education (timed):  improve balance, coordination, kinesthetic sense, posture, core stability and proprioception to improve patient's ability to develop conscious control of individual muscles and awareness of position of extremities in order to progress to PLOF and address remaining functional goals. (see flow sheet as applicable)

## 2025-01-03 ENCOUNTER — HOSPITAL ENCOUNTER (OUTPATIENT)
Facility: HOSPITAL | Age: 70
Setting detail: RECURRING SERIES
Discharge: HOME | End: 2025-01-06
Payer: MEDICARE

## 2025-01-03 PROCEDURE — 97112 NEUROMUSCULAR REEDUCATION: CPT

## 2025-01-03 PROCEDURE — 97530 THERAPEUTIC ACTIVITIES: CPT

## 2025-01-03 PROCEDURE — 97110 THERAPEUTIC EXERCISES: CPT

## 2025-01-03 NOTE — PROGRESS NOTES
[facility]   2/11/2025 11:00 AM Patel Wu PT [facility]   2/14/2025 11:00 AM Kelly Ho, [facility]

## 2025-01-07 ENCOUNTER — HOSPITAL ENCOUNTER (OUTPATIENT)
Facility: HOSPITAL | Age: 70
Setting detail: RECURRING SERIES
Discharge: HOME | End: 2025-01-10
Payer: MEDICARE

## 2025-01-07 PROCEDURE — 97140 MANUAL THERAPY 1/> REGIONS: CPT

## 2025-01-07 PROCEDURE — 97112 NEUROMUSCULAR REEDUCATION: CPT

## 2025-01-07 PROCEDURE — 97110 THERAPEUTIC EXERCISES: CPT

## 2025-01-07 NOTE — PROGRESS NOTES
degree AROM  Current: progressing, addressing with stretches and strengthening interventions including iliopsoas stretch  (1/03/2025)   - Goal: Patient will improve LEFS score to at least 33 pts to demonstrate increased functional mobility.  Status at last note/certification: 24 pts      Long Term Goals: To be accomplished in 8-12 weeks from initial evaluation  - Goal: Pt will demo B SLS for at least 30 seconds without LOB to improve safety with obstacle negotiation and curb ascent/descent.  Status at last note/certification: SLS right 7 seconds, left 15 seconds  Current:addressing with erika step overs (1/7/25)  - Goal: Pt to demonstrate at least 4/5 right hip extension MMT to improve hip stability during stance phase of gait.   Status at last note/certification: 3+/5  - Goal: Pt to report no more than 3/10 pain at worst in right hip to improve ease with prolonged standing.  Status at last note/certification: 8/10 pain at worst  - Goal: Patient will improve LEFS score to at least 42 pts to demonstrate increased functional mobility.  Status at last note/certification: 24 pts      PN due 1/23/25  RC due 3/21/25  ARSLAN    PLAN  [x] Continue plan of care  [x]  Upgrade activities as tolerated  []  Discharge due to :  []  Other:    Patel Wu, PT    1/7/2025    10:43 AM    If an interpreting service was utilized for treatment of this patient, the contents of this document represent the material reviewed with the patient via the .     Future Appointments   Date Time Provider Department Center   1/7/2025 11:40 AM Patel Wu PT [facility]   1/10/2025 10:20 AM Patel Wu PT [facility]   1/16/2025  1:40 PM Patel Wu PT [facility]   1/21/2025  9:00 AM Makayla Conte PT [facility]   1/23/2025  8:20 AM Sukh Hammer PTA [facility]   1/27/2025  8:45 AM VARSHA SLATER RM 1 [facility]   1/28/2025 12:20 PM Sukh Hammer PTA [facility]   1/30/2025  1:00 PM Sukh Hammer PTA

## 2025-01-10 ENCOUNTER — HOSPITAL ENCOUNTER (OUTPATIENT)
Facility: HOSPITAL | Age: 70
Setting detail: RECURRING SERIES
Discharge: HOME | End: 2025-01-13
Payer: MEDICARE

## 2025-01-10 PROCEDURE — 97110 THERAPEUTIC EXERCISES: CPT

## 2025-01-10 PROCEDURE — 97112 NEUROMUSCULAR REEDUCATION: CPT

## 2025-01-10 PROCEDURE — 97140 MANUAL THERAPY 1/> REGIONS: CPT

## 2025-01-10 NOTE — PROGRESS NOTES
PHYSICAL / OCCUPATIONAL THERAPY - DAILY TREATMENT NOTE (updated )    Patient Name: Mary Beth Durán    Date: 1/10/2025    : 1955  Insurance: Payor: MEDICARE / Plan: MEDICARE PART A AND B / Product Type: *No Product type* /      Patient  verified yes     Visit #   Current / Total 6 10 Total Time   Time   In / Out 10:26 11:08 42   Pain   In / Out 1-2 0    Subjective Functional Status/Changes: I felt pretty good after the hands on stretches last session.     TREATMENT AREA =  Right hip pain [M25.551]    OBJECTIVE      Therapeutic Procedures:  42  Total 42  Total [facility] Totals Reminder: bill using total billable min of TIMED therapeutic procedures (example: do not include dry needle or estim unattended, both untimed codes, in totals to left)  8-22 min = 1 unit; 23-37 min = 2 units; 38-52 min = 3 units; 53-67 min = 4 units; 68-82 min = 5 units   Tx Min Billable or 1:1 Min (if diff from Tx Min) Procedure, Rationale, Specifics   15 15 88803 Therapeutic Exercise (timed):  increase ROM, strength, coordination, balance, and proprioception to improve patient's ability to progress to PLOF and address remaining functional goals. (see flow sheet as applicable)   Details if applicable:       15 15 22729 Neuromuscular Re-Education (timed):  improve balance, coordination, kinesthetic sense, posture, core stability and proprioception to improve patient's ability to develop conscious control of individual muscles and awareness of position of extremities in order to progress to PLOF and address remaining functional goals. (see flow sheet as applicable)     Details if applicable:        62400 Manual Therapy (timed):  decrease pain, increase ROM, increase tissue extensibility, decrease trigger points, and increase postural awareness to improve patient's ability to progress to PLOF and address remaining functional goals.  The manual therapy interventions were performed at a separate and distinct time from the therapeutic

## 2025-01-14 ENCOUNTER — HOSPITAL ENCOUNTER (OUTPATIENT)
Facility: HOSPITAL | Age: 70
Setting detail: RECURRING SERIES
Discharge: HOME | End: 2025-01-17
Payer: MEDICARE

## 2025-01-14 PROCEDURE — 97110 THERAPEUTIC EXERCISES: CPT

## 2025-01-14 PROCEDURE — 97140 MANUAL THERAPY 1/> REGIONS: CPT

## 2025-01-14 PROCEDURE — 97112 NEUROMUSCULAR REEDUCATION: CPT

## 2025-01-14 NOTE — PROGRESS NOTES
Goal: Pt to demonstrate at least 4/5 right hip extension MMT to improve hip stability during stance phase of gait.   Status at last note/certification: 3+/5  - Goal: Pt to report no more than 3/10 pain at worst in right hip to improve ease with prolonged standing.  Status at last note/certification: 8/10 pain at worst  Current: progressing, 3/10 pain at worst (1/14/2025)  - Goal: Patient will improve LEFS score to at least 42 pts to demonstrate increased functional mobility.  Status at last note/certification: 24 pts      PN due 1/23/25  RC due 3/21/25  ARSLAN    PLAN  [x] Continue plan of care  [x]  Upgrade activities as tolerated  []  Discharge due to :  [x]  Other: attempt to initiate gentle TRX/squat taps/hip hinging    Mkaayla Conte PT    1/14/2025    8:29 AM    If an interpreting service was utilized for treatment of this patient, the contents of this document represent the material reviewed with the patient via the .     Future Appointments   Date Time Provider Department Center   1/14/2025 11:40 AM Makayla Conte PT [facility]   1/16/2025  1:40 PM Patel Wu PT [facility]   1/21/2025  9:00 AM Makayla Conte PT [facility]   1/23/2025  8:20 AM Sukh Hammer PTA [facility]   1/28/2025 12:20 PM Makayla Conte PT [facility]   1/29/2025  9:00 AM VARSHA NIXON  [facility]   1/30/2025  1:00 PM Sukh Hammer PTA [facility]   2/3/2025 11:00 AM Patel Wu PT [facility]   2/6/2025  8:30 AM Amna Bond APRN - CNP [facility]   2/7/2025 11:00 AM Kelly Ho PT [facility]   2/11/2025 11:00 AM Patel Wu PT [facility]   2/14/2025 11:00 AM Kelly Ho PT [facility]

## 2025-01-16 ENCOUNTER — HOSPITAL ENCOUNTER (OUTPATIENT)
Facility: HOSPITAL | Age: 70
Setting detail: RECURRING SERIES
Discharge: HOME | End: 2025-01-19
Payer: MEDICARE

## 2025-01-16 PROCEDURE — 97112 NEUROMUSCULAR REEDUCATION: CPT

## 2025-01-16 PROCEDURE — 97110 THERAPEUTIC EXERCISES: CPT

## 2025-01-16 PROCEDURE — 97140 MANUAL THERAPY 1/> REGIONS: CPT

## 2025-01-16 NOTE — PROGRESS NOTES
PHYSICAL / OCCUPATIONAL THERAPY - DAILY TREATMENT NOTE (updated )    Patient Name: Mary Beth Durán    Date: 2025    : 1955  Insurance: Payor: MEDICARE / Plan: MEDICARE PART A AND B / Product Type: *No Product type* /      Patient  verified yes     Visit #   Current / Total 8 10 Total Time   Time   In / Out 1:41 2:33 42   Pain   In / Out 2 0    Subjective Functional Status/Changes: The clamshells bothered my groin area more than my butt muscle.     TREATMENT AREA =  Right hip pain [M25.551]    OBJECTIVE    Modality rationale: decrease pain to improve the patient’s ability to perform ADLs following therapy session without increased pain.   Min Type Additional Details   10 []  Ice     [x]  heat   Position: supine  Location: anterior right hip   [x] Skin assessment post-treatment:  [x]intact []redness- no adverse reaction    []redness - adverse reaction:     Therapeutic Procedures:  42  Total 40  Total [facility] Totals Reminder: bill using total billable min of TIMED therapeutic procedures (example: do not include dry needle or estim unattended, both untimed codes, in totals to left)  8-22 min = 1 unit; 23-37 min = 2 units; 38-52 min = 3 units; 53-67 min = 4 units; 68-82 min = 5 units   Tx Min Billable or 1:1 Min (if diff from Tx Min) Procedure, Rationale, Specifics   18 16 06970 Therapeutic Exercise (timed):  increase ROM, strength, coordination, balance, and proprioception to improve patient's ability to progress to PLOF and address remaining functional goals. (see flow sheet as applicable)   Details if applicable:        69565 Neuromuscular Re-Education (timed):  improve balance, coordination, kinesthetic sense, posture, core stability and proprioception to improve patient's ability to develop conscious control of individual muscles and awareness of position of extremities in order to progress to PLOF and address remaining functional goals. (see flow sheet as applicable)     Details if applicable:

## 2025-01-21 ENCOUNTER — HOSPITAL ENCOUNTER (OUTPATIENT)
Facility: HOSPITAL | Age: 70
Setting detail: RECURRING SERIES
Discharge: HOME | End: 2025-01-24
Payer: MEDICARE

## 2025-01-21 PROCEDURE — 97535 SELF CARE MNGMENT TRAINING: CPT

## 2025-01-21 PROCEDURE — 97112 NEUROMUSCULAR REEDUCATION: CPT

## 2025-01-21 PROCEDURE — 97110 THERAPEUTIC EXERCISES: CPT

## 2025-01-21 PROCEDURE — 97140 MANUAL THERAPY 1/> REGIONS: CPT

## 2025-01-21 NOTE — PROGRESS NOTES
least 42 pts to demonstrate increased functional mobility.  Status at last note/certification: 24 pts      PN due 1/23/25  RC due 3/21/25  ARSLAN    PLAN  [x] Continue plan of care  [x]  Upgrade activities as tolerated  []  Discharge due to :  [x]  Other: PN due HONORIO Conte, PT    1/21/2025    8:33 AM    If an interpreting service was utilized for treatment of this patient, the contents of this document represent the material reviewed with the patient via the .     Future Appointments   Date Time Provider Department Center   1/21/2025  9:00 AM Makayla Conte PT [facility]   1/23/2025  8:20 AM Sukh Hammer PTA [facility]   1/28/2025 12:20 PM Makayla Conte PT [facility]   1/29/2025  9:00 AM VARSHA NIXON BX RM 1 [facility]   1/30/2025  1:00 PM Sukh Hammer PTA [facility]   2/3/2025 11:00 AM Patel Wu PT [facility]   2/6/2025 11:15 AM Amna Bond APRN - CNP [facility]   2/7/2025 11:00 AM Kelly Ho PT [facility]   2/11/2025 11:00 AM Patel Wu PT [facility]   2/14/2025 11:00 AM Kelly Ho PT [facility]

## 2025-01-23 ENCOUNTER — APPOINTMENT (OUTPATIENT)
Facility: HOSPITAL | Age: 70
End: 2025-01-23
Payer: MEDICARE

## 2025-01-28 ENCOUNTER — HOSPITAL ENCOUNTER (OUTPATIENT)
Facility: HOSPITAL | Age: 70
Setting detail: RECURRING SERIES
Discharge: HOME | End: 2025-01-31
Payer: MEDICARE

## 2025-01-28 PROCEDURE — 97140 MANUAL THERAPY 1/> REGIONS: CPT

## 2025-01-28 PROCEDURE — 97110 THERAPEUTIC EXERCISES: CPT

## 2025-01-28 PROCEDURE — 97112 NEUROMUSCULAR REEDUCATION: CPT

## 2025-01-28 NOTE — PROGRESS NOTES
PHYSICAL / OCCUPATIONAL THERAPY - DAILY TREATMENT NOTE (updated )    Patient Name: Mary Beth Durán    Date: 2025    : 1955  Insurance: Payor: MEDICARE / Plan: MEDICARE PART A AND B / Product Type: *No Product type* /      Patient  verified yes     Visit #   Current / Total 10 10 Total Time   Time   In / Out 11:40 12:30 50   Pain   In / Out 3 0    Subjective Functional Status/Changes: \"I unpacked things from the cabin and I'm sore. I did some butterfly stretches. I hiked at the cabin and used my walking sticks. I feel stiff this morning and achy in my groin muscle\"    - I need to get another injection and stop procrastinating      TREATMENT AREA =  Right hip pain [M25.551]    OBJECTIVE    Modality rationale: decrease pain to improve the patient’s ability to perform ADLs following therapy session without increased pain.   Min Type Additional Details   10 []  Ice     [x]  heat   Position: semi-reclined  Location: anterior right hip   [x] Skin assessment post-treatment:  [x]intact []redness- no adverse reaction    []redness - adverse reaction:     Therapeutic Procedures:  40  Total 40  Total  BC Totals Reminder: bill using total billable min of TIMED therapeutic procedures (example: do not include dry needle or estim unattended, both untimed codes, in totals to left)  8-22 min = 1 unit; 23-37 min = 2 units; 38-52 min = 3 units; 53-67 min = 4 units; 68-82 min = 5 units   Tx Min Billable or 1:1 Min (if diff from Tx Min) Procedure, Rationale, Specifics   12  40853 Therapeutic Exercise (timed):  increase ROM, strength, coordination, balance, and proprioception to improve patient's ability to progress to PLOF and address remaining functional goals. (see flow sheet as applicable)   Details if applicable: final reassessment for PN       13  92626 Neuromuscular Re-Education (timed):  improve balance, coordination, kinesthetic sense, posture, core stability and proprioception to improve patient's ability to 
protect the (L) knee; getting out of car after sustained sitting, getting R hip into car yields some R adductor pain  40% improvement  LEFS: 25/80  Pt goal: \" I know I need to change my mindset of what activities I can do and can' do; talk to Dr. Templeton about another injection and actually resting after the next one \"  -Pending MD appt to determine if she can resume ibuprofen       Palpation: TTP at right glute mm, lumbar paraspinals; + jump sign R adductors (at times, not on 1/28/25); TTP R mid to proximal quad      Lumbar AROM:                                           AROM (% of full)                  Right Left Effect   Flexion 90  decrease pain   Extension 50     Side Bend 50 50 Flank tightness                       Hip AROM:                                           AROM (deg)                  Right  Left   Hip     Flexion 108 109             Extension 1 4   Seated/ prone IR 18 p! / 30 deg 20   Seated / proneER 22p! / 5 deg 25                 Strength:    Right (/5) Left (/5)   Hip     Flexion 4 p! 5             Abduction 4- 4+             Extension 4- 4-             ER 4- 5             IR 3+ 4+   Knee   Extension 5 5              Flexion 5 5   Ankle   Dorsiflexion 5 5      Stair Negotiation: ascending  - prefers ascending with R but can use reciprocal pattern, poor stability noted (B) in the quads; descending prefers non-reciprocal leading with the L with no c/o pain on the R   Gait: antalgic (L) with decreased knee flexion in Terminal stance and Trendelenburg patterning on the R     Balance: SLS L 14\", R 14\" at best     Special Tests:       Right Left   Slump -       SLR  -         Right Left   Hamstring 90/90 +   +   Ely + +       Right Left   JAYESH   +     FADDIR +     Hip Scour +           GOALS FOR THIS PERIOD:  Short Term Goals: To be accomplished in 4 weeks  - Goal: Pt to be compliant with initial HEP to improve ability to independently address symptoms and functional deficits.  Status at last

## 2025-01-29 ENCOUNTER — HOSPITAL ENCOUNTER (OUTPATIENT)
Dept: WOMENS IMAGING | Facility: HOSPITAL | Age: 70
Discharge: HOME | End: 2025-02-01
Payer: MEDICARE

## 2025-01-29 VITALS — HEIGHT: 67 IN | BODY MASS INDEX: 30.13 KG/M2 | WEIGHT: 192 LBS

## 2025-01-29 DIAGNOSIS — Z12.31 ENCOUNTER FOR SCREENING MAMMOGRAM FOR MALIGNANT NEOPLASM OF BREAST: ICD-10-CM

## 2025-01-29 PROCEDURE — 77063 BREAST TOMOSYNTHESIS BI: CPT

## 2025-01-30 ENCOUNTER — HOSPITAL ENCOUNTER (OUTPATIENT)
Facility: HOSPITAL | Age: 70
Setting detail: RECURRING SERIES
End: 2025-01-30
Payer: MEDICARE

## 2025-01-30 PROCEDURE — 97112 NEUROMUSCULAR REEDUCATION: CPT

## 2025-01-30 PROCEDURE — 97110 THERAPEUTIC EXERCISES: CPT

## 2025-01-30 PROCEDURE — 97140 MANUAL THERAPY 1/> REGIONS: CPT

## 2025-01-30 NOTE — PROGRESS NOTES
PHYSICAL / OCCUPATIONAL THERAPY - DAILY TREATMENT NOTE    Patient Name: Mary Beth Durán    Date: 2025    : 1955  Insurance: Payor: MEDICARE / Plan: MEDICARE PART A AND B / Product Type: *No Product type* /      Patient  verified Yes     Visit #   Current / Total 1 10   Time   In / Out 1:00 pm 1:51 pm   Pain   In / Out 3/10 010   Subjective Functional Status/Changes: Patient notes difficulty lifting her right leg to get into the car.     TREATMENT AREA =  Right hip pain [M25.551]     OBJECTIVE  Modalities Rationale:     decrease pain to improve patient's ability to progress to PLOF and address remaining functional goals.    10 min  unbill   [x]  Heat    location/position: L/S and right posterior hip; prone for 5 minutes f/b supine with wedge supporting LE for 5 minutes   Skin assessment post-treatment:   Intact        Therapeutic Procedures:  Tx Min Billable or 1:1 Min (if diff from Tx Min) Procedure, Rationale, Specifics   18 15 24204 Therapeutic Exercise (timed):  increase ROM, strength, coordination, balance, and proprioception to improve patient's ability to progress to PLOF and address remaining functional goals. (see flow sheet as applicable)     Details if applicable:       15 15 30255 Neuromuscular Re-Education (timed):  improve balance, coordination, kinesthetic sense, posture, core stability and proprioception to improve patient's ability to develop conscious control of individual muscles and awareness of position of extremities in order to progress to PLOF and address remaining functional goals. (see flow sheet as applicable)     Details if applicable:  balance, triple LE flexion to improve ease with achieving foot clearance for car transfers     8 8 71855 Manual Therapy (timed):  decrease pain, increase ROM, and increase tissue extensibility to improve patient's ability to progress to PLOF and address remaining functional goals.  The manual therapy interventions were performed at a separate

## 2025-02-03 ENCOUNTER — HOSPITAL ENCOUNTER (OUTPATIENT)
Facility: HOSPITAL | Age: 70
Setting detail: RECURRING SERIES
Discharge: HOME | End: 2025-02-06
Payer: MEDICARE

## 2025-02-03 PROCEDURE — 97112 NEUROMUSCULAR REEDUCATION: CPT

## 2025-02-03 PROCEDURE — 97530 THERAPEUTIC ACTIVITIES: CPT

## 2025-02-03 PROCEDURE — 97110 THERAPEUTIC EXERCISES: CPT

## 2025-02-03 NOTE — PROGRESS NOTES
Details if applicable:       10 10 10554 Manual Therapy (timed):  decrease pain, increase ROM, increase tissue extensibility, decrease trigger points, and increase postural awareness to improve patient's ability to progress to PLOF and address remaining functional goals.  The manual therapy interventions were performed at a separate and distinct time from the therapeutic activities interventions . (see flow sheet as applicable)     Details if applicable:   massage to right adductor mm, manual right adductor stretch with distraction force applied throughout       [x]  Patient Education billed concurrently with other procedures   [x] Review HEP    [] Progressed/Changed HEP, detail:    [] Other detail:       Objective Information/Functional Measures/Assessment    - The pt continues to report frequent catching sensation in right anterior thigh associated with muscular spasm of right adductor mm.  - Intermittent catching sensation in right anterior groin could reflect bony incongruity of right hip articular surfaces gliding past one another followed by adductor mm spasm in attempt to provide more apparent stability to joint.  - Pt reports symptom relief with pressure to right adductor mm.  - Verbal cues provided during lateral walk to improve stance width for greater challenge to glute mm.      Patient will continue to benefit from skilled PT / OT services to modify and progress therapeutic interventions, analyze and address functional mobility deficits, analyze and address ROM deficits, analyze and address strength deficits, analyze and address soft tissue restrictions, analyze and cue for proper movement patterns, analyze and modify for postural abnormalities, analyze and address imbalance/dizziness, and instruct in home and community integration to address functional deficits and attain remaining goals.    Progress toward goals / Updated goals:  []  See Progress Note/Recertification    GOALS FOR NEXT

## 2025-02-04 SDOH — ECONOMIC STABILITY: FOOD INSECURITY: WITHIN THE PAST 12 MONTHS, THE FOOD YOU BOUGHT JUST DIDN'T LAST AND YOU DIDN'T HAVE MONEY TO GET MORE.: NEVER TRUE

## 2025-02-04 SDOH — ECONOMIC STABILITY: TRANSPORTATION INSECURITY
IN THE PAST 12 MONTHS, HAS THE LACK OF TRANSPORTATION KEPT YOU FROM MEDICAL APPOINTMENTS OR FROM GETTING MEDICATIONS?: NO

## 2025-02-04 SDOH — ECONOMIC STABILITY: INCOME INSECURITY: IN THE LAST 12 MONTHS, WAS THERE A TIME WHEN YOU WERE NOT ABLE TO PAY THE MORTGAGE OR RENT ON TIME?: NO

## 2025-02-04 SDOH — ECONOMIC STABILITY: TRANSPORTATION INSECURITY
IN THE PAST 12 MONTHS, HAS LACK OF TRANSPORTATION KEPT YOU FROM MEETINGS, WORK, OR FROM GETTING THINGS NEEDED FOR DAILY LIVING?: NO

## 2025-02-04 SDOH — ECONOMIC STABILITY: FOOD INSECURITY: WITHIN THE PAST 12 MONTHS, YOU WORRIED THAT YOUR FOOD WOULD RUN OUT BEFORE YOU GOT MONEY TO BUY MORE.: NEVER TRUE

## 2025-02-06 ENCOUNTER — OFFICE VISIT (OUTPATIENT)
Facility: CLINIC | Age: 70
End: 2025-02-06
Payer: MEDICARE

## 2025-02-06 VITALS
HEART RATE: 75 BPM | BODY MASS INDEX: 30.76 KG/M2 | SYSTOLIC BLOOD PRESSURE: 114 MMHG | WEIGHT: 196 LBS | TEMPERATURE: 97.3 F | RESPIRATION RATE: 16 BRPM | DIASTOLIC BLOOD PRESSURE: 69 MMHG | HEIGHT: 67 IN | OXYGEN SATURATION: 96 %

## 2025-02-06 DIAGNOSIS — Z78.0 POSTMENOPAUSAL: ICD-10-CM

## 2025-02-06 DIAGNOSIS — G89.29 CHRONIC RIGHT HIP PAIN: Primary | ICD-10-CM

## 2025-02-06 DIAGNOSIS — M85.80 OSTEOPENIA, UNSPECIFIED LOCATION: ICD-10-CM

## 2025-02-06 DIAGNOSIS — M25.551 CHRONIC RIGHT HIP PAIN: Primary | ICD-10-CM

## 2025-02-06 PROCEDURE — G8427 DOCREV CUR MEDS BY ELIG CLIN: HCPCS | Performed by: NURSE PRACTITIONER

## 2025-02-06 PROCEDURE — 1090F PRES/ABSN URINE INCON ASSESS: CPT | Performed by: NURSE PRACTITIONER

## 2025-02-06 PROCEDURE — 99214 OFFICE O/P EST MOD 30 MIN: CPT | Performed by: NURSE PRACTITIONER

## 2025-02-06 PROCEDURE — 1123F ACP DISCUSS/DSCN MKR DOCD: CPT | Performed by: NURSE PRACTITIONER

## 2025-02-06 PROCEDURE — G8399 PT W/DXA RESULTS DOCUMENT: HCPCS | Performed by: NURSE PRACTITIONER

## 2025-02-06 PROCEDURE — 1159F MED LIST DOCD IN RCRD: CPT | Performed by: NURSE PRACTITIONER

## 2025-02-06 PROCEDURE — 1125F AMNT PAIN NOTED PAIN PRSNT: CPT | Performed by: NURSE PRACTITIONER

## 2025-02-06 PROCEDURE — 1036F TOBACCO NON-USER: CPT | Performed by: NURSE PRACTITIONER

## 2025-02-06 PROCEDURE — 1160F RVW MEDS BY RX/DR IN RCRD: CPT | Performed by: NURSE PRACTITIONER

## 2025-02-06 PROCEDURE — 3017F COLORECTAL CA SCREEN DOC REV: CPT | Performed by: NURSE PRACTITIONER

## 2025-02-06 PROCEDURE — G8417 CALC BMI ABV UP PARAM F/U: HCPCS | Performed by: NURSE PRACTITIONER

## 2025-02-06 SDOH — ECONOMIC STABILITY: FOOD INSECURITY: WITHIN THE PAST 12 MONTHS, YOU WORRIED THAT YOUR FOOD WOULD RUN OUT BEFORE YOU GOT MONEY TO BUY MORE.: NEVER TRUE

## 2025-02-06 SDOH — ECONOMIC STABILITY: FOOD INSECURITY: WITHIN THE PAST 12 MONTHS, THE FOOD YOU BOUGHT JUST DIDN'T LAST AND YOU DIDN'T HAVE MONEY TO GET MORE.: NEVER TRUE

## 2025-02-06 ASSESSMENT — PATIENT HEALTH QUESTIONNAIRE - PHQ9
1. LITTLE INTEREST OR PLEASURE IN DOING THINGS: NOT AT ALL
SUM OF ALL RESPONSES TO PHQ QUESTIONS 1-9: 0
SUM OF ALL RESPONSES TO PHQ QUESTIONS 1-9: 0
2. FEELING DOWN, DEPRESSED OR HOPELESS: NOT AT ALL
SUM OF ALL RESPONSES TO PHQ QUESTIONS 1-9: 0
SUM OF ALL RESPONSES TO PHQ9 QUESTIONS 1 & 2: 0
SUM OF ALL RESPONSES TO PHQ QUESTIONS 1-9: 0

## 2025-02-06 NOTE — PROGRESS NOTES
Mary Beth Durán is a 70 y.o. female  established patient, here for evaluation of the following chief complaint(s):  Chief Complaint   Patient presents with    Follow-up        Assessment and Plan  1. Chronic right hip pain  2. Osteopenia, unspecified location  -     DEXA BONE DENSITY AXIAL SKELETON; Future  3. Postmenopausal  -     DEXA BONE DENSITY AXIAL SKELETON; Future       Return in about 6 months (around 8/6/2025) for Routine, 15.   HPI:   In office visit. Pt is well.     Pt has seen ortho for her bilateral hip pain. She has seen another specialist for her back. She is in PT for her right hip.     Recent normal mammogram     Patient feels the remdesivir caused nose bleeds when had covid and she would like to see hematology.  Patient has not had any recent nosebleeds. She has seen hematology and having testing.    Pt is taking vitamin K2D3, magnesium and niacin.     1/17/2023 Bone Density  IMPRESSION:  BMD measures  consistent with osteopenia/low bone density.    ROS:    General: negative for - chills, fever, weight changes or malaise  HEENT: no sore throat, nasal congestion, vision problems or ear problems  Respiratory: no cough, shortness of breath, or wheezing  Cardiovascular: no chest pain, palpitations, or dyspnea on exertion  Gastrointestinal: no abdominal pain, N/V, change in bowel habits  Musculoskeletal: no back pain or joint pain  Neurological: no headache or dizziness  Endo:  No polyuria or polydipsia  : no urinary  Skin: none   Psychological: negative for - anxiety, depression, sleeps issues    Prior to Admission medications    Medication Sig Start Date End Date Taking? Authorizing Provider   aspirin-acetaminophen-caffeine (EXCEDRIN MIGRAINE) 250-250-65 MG per tablet Aspirin-Acetaminophen-Caffeine Oral 250 mg-250 mg-65 mg    mg     active   Yes Provider, MD Donita   Cyanocobalamin 1000 MCG CAPS Cyanocobalamin Oral    mcg  GUMMY   active   Yes ProviderDonita MD   tiZANidine (ZANAFLEX) 2 MG 
\"Have you been to the ER, urgent care clinic since your last visit?  Hospitalized since your last visit?\"    NO    “Have you seen or consulted any other health care providers outside our system since your last visit?”    NO             
Normotensive  -c/w home med lisinopril 10mg PO qd
Well controlled  -c/w home med lisinopril 10mg PO qd
Normotensive  -c/w home med lisinopril 10mg PO qd

## 2025-02-06 NOTE — PATIENT INSTRUCTIONS
[facility] [place]  Address: [address] [place] [id number]  Phone: [phone]

## 2025-02-07 ENCOUNTER — HOSPITAL ENCOUNTER (OUTPATIENT)
Facility: HOSPITAL | Age: 70
Setting detail: RECURRING SERIES
Discharge: HOME | End: 2025-02-10
Payer: MEDICARE

## 2025-02-07 PROCEDURE — 97110 THERAPEUTIC EXERCISES: CPT

## 2025-02-07 PROCEDURE — 97112 NEUROMUSCULAR REEDUCATION: CPT

## 2025-02-07 PROCEDURE — 97140 MANUAL THERAPY 1/> REGIONS: CPT

## 2025-02-07 NOTE — PROGRESS NOTES
PHYSICAL / OCCUPATIONAL THERAPY - DAILY TREATMENT NOTE (updated )    Patient Name: Mary Beth Durán    Date: 2025    : 1955  Insurance: Payor: MEDICARE / Plan: MEDICARE PART A AND B / Product Type: *No Product type* /      Patient  verified yes     Visit #   Current / Total 3 10 Total Time   Time   In / Out 11:00 AM 11:55 AM 55   Pain   In / Out 3 2-3    Subjective Functional Status/Changes: \"Getting in and out of the car is still problematic. I am trying to stay positive.\"       TREATMENT AREA =  Right hip pain [M25.551]    OBJECTIVE    Modality rationale: decrease pain to improve the patient’s ability to perform ADLs following therapy session without increased pain.   Min Type Additional Details   10 []  Ice     [x]  heat   Position: reclined w/ wedge  Location: lateral right hip   [x] Skin assessment post-treatment:  [x]intact []redness- no adverse reaction    []redness - adverse reaction:     Therapeutic Procedures:    Total  45   Total  40 [facility] Totals Reminder: bill using total billable min of TIMED therapeutic procedures (example: do not include dry needle or estim unattended, both untimed codes, in totals to left)  8-22 min = 1 unit; 23-37 min = 2 units; 38-52 min = 3 units; 53-67 min = 4 units; 68-82 min = 5 units   Tx Min Billable or 1:1 Min (if diff from Tx Min) Procedure, Rationale, Specifics   22 17 76011 Therapeutic Exercise (timed):  increase ROM, strength, coordination, balance, and proprioception to improve patient's ability to progress to PLOF and address remaining functional goals. (see flow sheet as applicable)     Details if applicable:  stretches, strengthening, nu step      14 14 44635 Neuromuscular Re-Education (timed):  improve balance, coordination, kinesthetic sense, posture, core stability and proprioception to improve patient's ability to develop conscious control of individual muscles and awareness of position of extremities in order to progress to PLOF and address

## 2025-02-11 ENCOUNTER — HOSPITAL ENCOUNTER (OUTPATIENT)
Facility: HOSPITAL | Age: 70
Setting detail: RECURRING SERIES
Discharge: HOME | End: 2025-02-14
Payer: MEDICARE

## 2025-02-11 PROCEDURE — 97110 THERAPEUTIC EXERCISES: CPT

## 2025-02-11 PROCEDURE — 97530 THERAPEUTIC ACTIVITIES: CPT

## 2025-02-11 PROCEDURE — 97140 MANUAL THERAPY 1/> REGIONS: CPT

## 2025-02-11 NOTE — PROGRESS NOTES
modify for postural abnormalities, analyze and address imbalance/dizziness, and instruct in home and community integration to address functional deficits and attain remaining goals.    Progress toward goals / Updated goals:  []  See Progress Note/Recertification    GOALS FOR NEXT PERIOD: (condensed but not altered per  guidelines)  - Goal: Pt to be compliant with initial HEP to improve ability to independently address symptoms and functional deficits.  Status at last note/certification:  pt reports compliance and able to use for mobility and decreased pain   - Goal: Pt to demonstrate at least 4+/5 right hip abduction MMT to improve hip stability during stance phase of gait.   Status at last note/certification: 4-/5 with mixed tolreance to strength interventions  Current: addressing with hooklying abduction (2/11/25)  - Goal: Patient will demonstrate at least 5 degrees of right hip extension AROM to increase ease of ADLs.  Status at last note/certification: PROM R hip 3 deg after stretching  Current: cont to address with LE stretching (2/3/25)  - Goal: Patient will improve LEFS score to at least 33 pts to demonstrate increased functional mobility.  Status at last note/certification: 28 /80     Long Term Goals: To be accomplished in 8-12 weeks from initial evaluation  - Goal: Pt will demo B SLS for at least 30 seconds without LOB to improve safety with obstacle negotiation and curb ascent/descent.  Status at last note/certification: 14\" (B) at best    Current: addressing with step up plus balance challenge (2/11/25)  - Goal: Pt to demonstrate at least 4/5 right hip extension MMT to improve hip stability during stance phase of gait.   Status at last note/certification:4-/5 B   Current: progressing, addressing with hip strengthening including resisted hip extension in standing (2/07/2025)   - Goal: Pt to report no more than 3/10 pain at worst in right hip to improve ease with prolonged standing.  Status at last

## 2025-02-14 ENCOUNTER — HOSPITAL ENCOUNTER (OUTPATIENT)
Facility: HOSPITAL | Age: 70
Setting detail: RECURRING SERIES
Discharge: HOME | End: 2025-02-17
Payer: MEDICARE

## 2025-02-14 PROCEDURE — 97112 NEUROMUSCULAR REEDUCATION: CPT

## 2025-02-14 PROCEDURE — 97530 THERAPEUTIC ACTIVITIES: CPT

## 2025-02-14 PROCEDURE — 97110 THERAPEUTIC EXERCISES: CPT

## 2025-02-14 NOTE — PROGRESS NOTES
PHYSICAL / OCCUPATIONAL THERAPY - DAILY TREATMENT NOTE (updated )    Patient Name: Mary Beth Durán    Date: 2025    : 1955  Insurance: Payor: MEDICARE / Plan: MEDICARE PART A AND B / Product Type: *No Product type* /      Patient  verified yes     Visit #   Current / Total 5 10 Total Time   Time   In / Out 11:00 AM 11:55 AM 55   Pain   In / Out 1/10 0/10    Subjective Functional Status/Changes: \"I am sleeping better since the injection. Last night I had like two 5 hour lumps. I still feel a groin twinge but it is like a strain versus electrifying. Getting socks on was a smidge easier.\"       TREATMENT AREA =  Right hip pain [M25.551]    OBJECTIVE    Modality rationale: decrease pain to improve the patient’s ability to perform ADLs following therapy session without increased pain.   Min Type Additional Details   15 []  Ice     [x]  heat   Position:  [x]supine     Location: [x]right                      [x]hip    [x] Skin assessment post-treatment:  [x]intact []redness- no adverse reaction    []redness - adverse reaction:     Therapeutic Procedures:    Total  45   Total  39 [facility] Totals Reminder: bill using total billable min of TIMED therapeutic procedures (example: do not include dry needle or estim unattended, both untimed codes, in totals to left)  8-22 min = 1 unit; 23-37 min = 2 units; 38-52 min = 3 units; 53-67 min = 4 units; 68-82 min = 5 units   Tx Min Billable or 1:1 Min (if diff from Tx Min) Procedure, Rationale, Specifics   92 [phone] [id number] Therapeutic Exercise (timed):  increase ROM, strength, coordination, balance, and proprioception to improve patient's ability to progress to PLOF and address remaining functional goals. (see flow sheet as applicable)     Details if applicable:  stretches, strengthening, nu step      10 10 [id number] Therapeutic Activity (timed):  use of dynamic activities replicating functional movements to increase ROM, strength, coordination, balance, and proprioception in

## 2025-02-17 ENCOUNTER — HOSPITAL ENCOUNTER (OUTPATIENT)
Facility: HOSPITAL | Age: 70
Setting detail: RECURRING SERIES
Discharge: HOME | End: 2025-02-20
Payer: MEDICARE

## 2025-02-17 PROCEDURE — 97112 NEUROMUSCULAR REEDUCATION: CPT

## 2025-02-17 PROCEDURE — 97530 THERAPEUTIC ACTIVITIES: CPT

## 2025-02-17 PROCEDURE — 97110 THERAPEUTIC EXERCISES: CPT

## 2025-02-17 NOTE — PROGRESS NOTES
PHYSICAL / OCCUPATIONAL THERAPY - DAILY TREATMENT NOTE (updated )    Patient Name: Mary Beth Durán    Date: 2025    : 1955  Insurance: Payor: MEDICARE / Plan: MEDICARE PART A AND B / Product Type: *No Product type* /      Patient  verified yes     Visit #   Current / Total 6 10 Total Time   Time   In / Out 9:43 AM 10:36 AM 53   Pain   In / Out 1/10 0/10    Subjective Functional Status/Changes: \"I haven't had that electrifying pain, but still have the groin ache. If anything, my low back hurt more than that.      TREATMENT AREA =  Right hip pain [M25.551]    OBJECTIVE    Modality rationale: decrease pain to improve the patient’s ability to perform ADLs following therapy session without increased pain.   Min Type Additional Details   10 []  Ice     [x]  heat   Position:  [x]supine     Location: [x]right                      [x]hip    [x] Skin assessment post-treatment:  [x]intact []redness- no adverse reaction    []redness - adverse reaction:     Therapeutic Procedures:    Total  43   Total  40 [facility] Totals Reminder: bill using total billable min of TIMED therapeutic procedures (example: do not include dry needle or estim unattended, both untimed codes, in totals to left)  8-22 min = 1 unit; 23-37 min = 2 units; 38-52 min = 3 units; 53-67 min = 4 units; 68-82 min = 5 units   Tx Min Billable or 1:1 Min (if diff from Tx Min) Procedure, Rationale, Specifics   19 [phone] [id number] Therapeutic Exercise (timed):  increase ROM, strength, coordination, balance, and proprioception to improve patient's ability to progress to PLOF and address remaining functional goals. (see flow sheet as applicable)     Details if applicable:  stretches, strengthening, nu step      10 10 [id number] Therapeutic Activity (timed):  use of dynamic activities replicating functional movements to increase ROM, strength, coordination, balance, and proprioception in order to improve patient's ability to progress to PLOF and address remaining

## 2025-02-19 ENCOUNTER — APPOINTMENT (OUTPATIENT)
Facility: HOSPITAL | Age: 70
End: 2025-02-19
Payer: MEDICARE

## 2025-03-03 ENCOUNTER — HOSPITAL ENCOUNTER (OUTPATIENT)
Facility: HOSPITAL | Age: 70
Setting detail: RECURRING SERIES
Discharge: HOME | End: 2025-03-06
Payer: MEDICARE

## 2025-03-03 PROCEDURE — 97530 THERAPEUTIC ACTIVITIES: CPT

## 2025-03-03 PROCEDURE — 97110 THERAPEUTIC EXERCISES: CPT

## 2025-03-03 NOTE — PROGRESS NOTES
Physical Therapy Discharge Instructions      [facility]   [address] [address] [phone] fax      Patient: Mary Beth Durán  : 1955      Continue Home Exercise Program 4-7 times per week      Continue with    [x] Heat  as needed           Follow up with MD:     [x] As needed      Recommendations:   [x]   Return to activity with home program      Monique Weinberg, PT 3/3/2025 1:34 PM   
improve patient's ability to progress to PLOF and address remaining functional goals. (see flow sheet as applicable)     Details if applicable:  calvin, pt education to continue HEP at home     25  [id number] Therapeutic Activity (timed):  use of dynamic activities replicating functional movements to increase ROM, strength, coordination, balance, and proprioception in order to improve patient's ability to progress to PLOF and address remaining functional goals.  (see flow sheet as applicable)     Details if applicable:  goals   40  [facility] Totals Reminder: bill using total billable min of TIMED therapeutic procedures (example: do not include dry needle or estim unattended, both untimed codes, in totals to left)  8-22 min = 1 unit; 23-37 min = 2 units; 38-52 min = 3 units; 53-67 min = 4 units; 68-82 min = 5 units   Total Total       [x]  Patient Education billed concurrently with other procedures   [x] Review HEP    [] Progressed/Changed HEP, detail:    [] Other detail:       Objective Information/Functional Measures/Assessment  Functional gains: transfers, sleeping tolerance (uses a wedge), able to lay on her side with less pain, back brace helps, strength in quads  Functional deficits: crossing leg to don/doff a shoe, prolonged sitting on a hard surface, groin achy pain, muscle spasms, prolonged standing  At best pain: 1/10  At worst pain: 8-9/10    Pt was seen for 17 therapy sessions. Pt demonstrated/reported improvements in transfer ability, sleeping tolerance and overall strength since starting therapy. She reports having improved tolerance to laying on her side in bed. She continues to have difficulty with crossing her right LE to don a shoe, pain with prolonged sitting and standing. Pt has a therapy order for her LBP and would like to start therapy for this. Pt educated to continue HEP at home for her right hip symptoms. Pt is therefore d/c'ed from therapy at this time.     GOALS  - Goal: Pt to be compliant with

## 2025-03-05 ENCOUNTER — APPOINTMENT (OUTPATIENT)
Facility: HOSPITAL | Age: 70
End: 2025-03-05
Payer: MEDICARE

## 2025-03-10 ENCOUNTER — HOSPITAL ENCOUNTER (OUTPATIENT)
Facility: HOSPITAL | Age: 70
Discharge: HOME | End: 2025-03-13
Payer: MEDICARE

## 2025-03-10 DIAGNOSIS — Z78.0 POSTMENOPAUSAL: ICD-10-CM

## 2025-03-10 DIAGNOSIS — M85.80 OSTEOPENIA, UNSPECIFIED LOCATION: ICD-10-CM

## 2025-03-10 PROCEDURE — 77080 DXA BONE DENSITY AXIAL: CPT

## 2025-03-13 ENCOUNTER — OFFICE VISIT (OUTPATIENT)
Facility: CLINIC | Age: 70
End: 2025-03-13
Payer: MEDICARE

## 2025-03-13 VITALS
HEART RATE: 78 BPM | BODY MASS INDEX: 31.71 KG/M2 | SYSTOLIC BLOOD PRESSURE: 116 MMHG | TEMPERATURE: 98.1 F | WEIGHT: 202 LBS | RESPIRATION RATE: 16 BRPM | HEIGHT: 67 IN | OXYGEN SATURATION: 95 % | DIASTOLIC BLOOD PRESSURE: 72 MMHG

## 2025-03-13 DIAGNOSIS — E04.1 THYROID NODULE: ICD-10-CM

## 2025-03-13 DIAGNOSIS — Z01.818 PRE-OP EXAM: Primary | ICD-10-CM

## 2025-03-13 DIAGNOSIS — S52.502K CLOSED FRACTURE OF DISTAL END OF LEFT RADIUS WITH NONUNION, UNSPECIFIED FRACTURE MORPHOLOGY, SUBSEQUENT ENCOUNTER: ICD-10-CM

## 2025-03-13 DIAGNOSIS — M85.80 OSTEOPENIA, UNSPECIFIED LOCATION: ICD-10-CM

## 2025-03-13 PROCEDURE — 1090F PRES/ABSN URINE INCON ASSESS: CPT | Performed by: NURSE PRACTITIONER

## 2025-03-13 PROCEDURE — 99214 OFFICE O/P EST MOD 30 MIN: CPT | Performed by: NURSE PRACTITIONER

## 2025-03-13 PROCEDURE — 1126F AMNT PAIN NOTED NONE PRSNT: CPT | Performed by: NURSE PRACTITIONER

## 2025-03-13 PROCEDURE — 1036F TOBACCO NON-USER: CPT | Performed by: NURSE PRACTITIONER

## 2025-03-13 PROCEDURE — G8417 CALC BMI ABV UP PARAM F/U: HCPCS | Performed by: NURSE PRACTITIONER

## 2025-03-13 PROCEDURE — G8399 PT W/DXA RESULTS DOCUMENT: HCPCS | Performed by: NURSE PRACTITIONER

## 2025-03-13 PROCEDURE — 3017F COLORECTAL CA SCREEN DOC REV: CPT | Performed by: NURSE PRACTITIONER

## 2025-03-13 PROCEDURE — 1159F MED LIST DOCD IN RCRD: CPT | Performed by: NURSE PRACTITIONER

## 2025-03-13 PROCEDURE — G8427 DOCREV CUR MEDS BY ELIG CLIN: HCPCS | Performed by: NURSE PRACTITIONER

## 2025-03-13 PROCEDURE — 1123F ACP DISCUSS/DSCN MKR DOCD: CPT | Performed by: NURSE PRACTITIONER

## 2025-03-13 PROCEDURE — 1160F RVW MEDS BY RX/DR IN RCRD: CPT | Performed by: NURSE PRACTITIONER

## 2025-03-13 NOTE — PROGRESS NOTES
19/2025/Mary Beth MIGUEL Durán is a 70 y.o. female  established patient, here for evaluation of the following chief complaint(s):  Chief Complaint   Patient presents with    Pre-op Exam      Assessment and Plan  1. Pre-op exam  2. Thyroid nodule  -     US THYROID; Future  3. 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 Osteopenia, unspecified location  Comments:  cotinue Vit D, calcium       Return in about 1 month (around 4/13/2025).   HPI:   In office visit. Pt appears well. She has her left arm in a slang and soft cast.     Pt fell 3/6/2025 in a parking lot went to the ED. She has broken her left arm w/ impacted distal radius fracture. She needs surgery on her right arm w/ Dr. Hawk 3/19/2025.   PT is clear for surgery    Pt had found out she has a nodule on her tyroid. 3/6/2025 An incidental finding on your CT scan of the neck showed a right thyroid nodule. Ordered US thyroid     3/13/2025 EKG  Sinus rhythm  Borderline left axis deviation  Otherwise normal ECG    3/6/2025 XR - WRIST COMPLETE LT   Final Result   Acute impacted distal radius fracture.     3/6/2025 CT - CERVICAL SPINE W/O CONTRAST   Final Result   1. No acute fracture or traumatic malalignment of the cervical spine.   2. Mild multilevel degenerative changes.   3. Peripherally calcified 1.3 cm right thyroid nodule. Consider nonemergent thyroid ultrasound for further evaluation.     3/6/2025 CT FACIAL W/O CONTRAST   Final Result   No acute fracture.   Mild soft tissue contusion anterior left face.     3/6/2025 XR - HAND COMPLETE RT   Final Result   1. A subtle linear lucency suspicious for a nondisplaced fracture versus normal nutrient channel at the volar base of the middle phalanx of the third finger at the PIP joint.   2. Another well-corticated bony particle likely degenerative changes at the dorsal DIP joint of the second finger. Recommend clinical correlation with local

## 2025-03-13 NOTE — PATIENT INSTRUCTIONS
[facility] [phone]  Please let me know when you complete your imaging

## 2025-03-14 ENCOUNTER — HOSPITAL ENCOUNTER (OUTPATIENT)
Facility: HOSPITAL | Age: 70
Discharge: HOME | End: 2025-03-17
Payer: MEDICARE

## 2025-03-14 DIAGNOSIS — E04.1 THYROID NODULE: ICD-10-CM

## 2025-03-14 PROCEDURE — 76536 US EXAM OF HEAD AND NECK: CPT

## 2025-03-17 ENCOUNTER — APPOINTMENT (OUTPATIENT)
Facility: HOSPITAL | Age: 70
End: 2025-03-17
Payer: MEDICARE

## 2025-04-29 ENCOUNTER — OFFICE VISIT (OUTPATIENT)
Facility: CLINIC | Age: 70
End: 2025-04-29
Payer: MEDICARE

## 2025-04-29 VITALS
BODY MASS INDEX: 32.33 KG/M2 | WEIGHT: 206 LBS | OXYGEN SATURATION: 95 % | HEIGHT: 67 IN | RESPIRATION RATE: 16 BRPM | TEMPERATURE: 98.4 F | DIASTOLIC BLOOD PRESSURE: 73 MMHG | SYSTOLIC BLOOD PRESSURE: 132 MMHG | HEART RATE: 72 BPM

## 2025-04-29 DIAGNOSIS — E66.09 CLASS 1 OBESITY DUE TO EXCESS CALORIES WITHOUT SERIOUS COMORBIDITY WITH BODY MASS INDEX (BMI) OF 32.0 TO 32.9 IN ADULT: ICD-10-CM

## 2025-04-29 DIAGNOSIS — E04.1 RIGHT THYROID NODULE: ICD-10-CM

## 2025-04-29 DIAGNOSIS — S52.502K CLOSED FRACTURE OF DISTAL END OF LEFT RADIUS WITH NONUNION, UNSPECIFIED FRACTURE MORPHOLOGY, SUBSEQUENT ENCOUNTER: Primary | ICD-10-CM

## 2025-04-29 DIAGNOSIS — E66.811 CLASS 1 OBESITY DUE TO EXCESS CALORIES WITHOUT SERIOUS COMORBIDITY WITH BODY MASS INDEX (BMI) OF 32.0 TO 32.9 IN ADULT: ICD-10-CM

## 2025-04-29 PROCEDURE — G8427 DOCREV CUR MEDS BY ELIG CLIN: HCPCS | Performed by: NURSE PRACTITIONER

## 2025-04-29 PROCEDURE — 1090F PRES/ABSN URINE INCON ASSESS: CPT | Performed by: NURSE PRACTITIONER

## 2025-04-29 PROCEDURE — 1126F AMNT PAIN NOTED NONE PRSNT: CPT | Performed by: NURSE PRACTITIONER

## 2025-04-29 PROCEDURE — 99214 OFFICE O/P EST MOD 30 MIN: CPT | Performed by: NURSE PRACTITIONER

## 2025-04-29 PROCEDURE — 3017F COLORECTAL CA SCREEN DOC REV: CPT | Performed by: NURSE PRACTITIONER

## 2025-04-29 PROCEDURE — G8417 CALC BMI ABV UP PARAM F/U: HCPCS | Performed by: NURSE PRACTITIONER

## 2025-04-29 PROCEDURE — 1159F MED LIST DOCD IN RCRD: CPT | Performed by: NURSE PRACTITIONER

## 2025-04-29 PROCEDURE — G8399 PT W/DXA RESULTS DOCUMENT: HCPCS | Performed by: NURSE PRACTITIONER

## 2025-04-29 PROCEDURE — 1123F ACP DISCUSS/DSCN MKR DOCD: CPT | Performed by: NURSE PRACTITIONER

## 2025-04-29 PROCEDURE — 1036F TOBACCO NON-USER: CPT | Performed by: NURSE PRACTITIONER

## 2025-04-29 NOTE — PROGRESS NOTES
and importance of compliance with the treatment plan as well as documenting on the day of the visit.      LORELEI AriasC

## 2025-05-13 ENCOUNTER — HOSPITAL ENCOUNTER (OUTPATIENT)
Facility: HOSPITAL | Age: 70
Discharge: HOME | End: 2025-05-16
Payer: MEDICARE

## 2025-05-13 DIAGNOSIS — E04.1 RIGHT THYROID NODULE: ICD-10-CM

## 2025-05-13 PROCEDURE — 88173 CYTOPATH EVAL FNA REPORT: CPT

## 2025-05-13 PROCEDURE — 10005 FNA BX W/US GDN 1ST LES: CPT

## 2025-05-13 PROCEDURE — 88177 CYTP FNA EVAL EA ADDL: CPT

## 2025-05-13 PROCEDURE — 88172 CYTP DX EVAL FNA 1ST EA SITE: CPT

## 2025-05-13 NOTE — PROCEDURES
PROCEDURE NOTE  Date: 5/13/2025   Name: Mary Beth Durán  YOB: 1955    Procedures            RADIOLOGY POST PROCEDURE NOTE     May 13, 2025       4:06 PM     Preoperative Diagnosis:   right thyroid nodule    Postoperative Diagnosis:  Same.    :  JAKUB Lainez     Assistant:  Dr. Palmer    Type of Anesthesia: 1% lidocaine locally    Procedure/Description:  Image-guided right thyroid nodule FNA biopsy    Findings:   Successful right thyroid nodule FNA biopsy  5 passes with a 22 and 25 gauge needle  Pathologist present in the room to confirm specimen adequacy    Estimated blood Loss:  Minimal    Specimen Removed:  Yes    Blood transfusions:  None.    Implants:  None.    Complications: None    Condition: Stable    Blood thinning medications: OK to resume as clinically indicated    Discharge Plan:  discharge home    JAKUB Lainez

## 2025-05-13 NOTE — H&P
OUTPATIENT HISTORY AND PHYSICAL    Today 5/13/2025     Indication/Symptoms:   Mary Beth Durán is a 70 y.o. female with imaging showing thyroid nodule requiring further diagnostics who presents for an US-guided thyroid nodule FNA biopsy.     Current Meds:    Prior to Admission medications    Medication Sig Start Date End Date Taking? Authorizing Provider   aspirin-acetaminophen-caffeine (EXCEDRIN MIGRAINE) 250-250-65 MG per tablet Aspirin-Acetaminophen-Caffeine Oral 250 mg-250 mg-65 mg    mg     active    Provider, MD Donita   Cyanocobalamin 1000 MCG CAPS Cyanocobalamin Oral    mcg  GUMMY   active    Provider, MD Donita   tiZANidine (ZANAFLEX) 2 MG tablet Take 1 tablet by mouth nightly as needed (muscle spasm) 12/19/24   Amna Bond APRN - CNP   Calcium-Phosphorus-Vitamin D (CITRACAL +D3) 250-107-500 MG-MG-UNIT CHEW Take 2 tablets by mouth in the morning and at bedtime 11/10/24   Amna Bond APRN - CNP   Magnesium Bisglycinate (MAG GLYCINATE) 100 MG TABS Take 200 mg by mouth at bedtime 11/10/24   Amna Bond APRN - CNP   vitamin D 50 MCG (2000 UT) CAPS capsule Take 2 capsules by mouth daily 11/10/24   Amna Bond APRN - CNP   niacin 250 MG extended release capsule Take 1 capsule by mouth nightly 8/6/24 8/6/25  Amna Bond APRN - CNP   Acetaminophen (TYLENOL DISSOLVE PACKS) 500 MG PACK Take by mouth    Automatic Reconciliation, Ar   diphenhydrAMINE (BENADRYL) 25 MG capsule Take 1 capsule by mouth    Automatic Reconciliation, Ar       Allergies:      Allergies   Allergen Reactions    Statins      Muscle pain    Ibuprofen Other (See Comments)     Nose Bleeding        Comorbid Conditions:    Past Medical History:   Diagnosis Date    Abnormal LFTs     Eczema     Hearing loss May 2023    Plans for hearing aid    History of atypical migraine     History of UTI     Nausea & vomiting     Osteoarthritis 2020    Left big toe surgery          Past Surgical History:   Procedure Laterality

## 2025-05-29 ENCOUNTER — TELEMEDICINE (OUTPATIENT)
Facility: CLINIC | Age: 70
End: 2025-05-29
Payer: MEDICARE

## 2025-05-29 DIAGNOSIS — E04.1 BENIGN THYROID CYST: Primary | ICD-10-CM

## 2025-05-29 DIAGNOSIS — M25.551 CHRONIC RIGHT HIP PAIN: ICD-10-CM

## 2025-05-29 DIAGNOSIS — G89.29 CHRONIC RIGHT HIP PAIN: ICD-10-CM

## 2025-05-29 DIAGNOSIS — S52.502K CLOSED FRACTURE OF DISTAL END OF LEFT RADIUS WITH NONUNION, UNSPECIFIED FRACTURE MORPHOLOGY, SUBSEQUENT ENCOUNTER: ICD-10-CM

## 2025-05-29 PROCEDURE — 99214 OFFICE O/P EST MOD 30 MIN: CPT | Performed by: NURSE PRACTITIONER

## 2025-05-29 PROCEDURE — G8427 DOCREV CUR MEDS BY ELIG CLIN: HCPCS | Performed by: NURSE PRACTITIONER

## 2025-05-29 PROCEDURE — 1090F PRES/ABSN URINE INCON ASSESS: CPT | Performed by: NURSE PRACTITIONER

## 2025-05-29 PROCEDURE — 3017F COLORECTAL CA SCREEN DOC REV: CPT | Performed by: NURSE PRACTITIONER

## 2025-05-29 PROCEDURE — G8417 CALC BMI ABV UP PARAM F/U: HCPCS | Performed by: NURSE PRACTITIONER

## 2025-05-29 PROCEDURE — 1160F RVW MEDS BY RX/DR IN RCRD: CPT | Performed by: NURSE PRACTITIONER

## 2025-05-29 PROCEDURE — 1036F TOBACCO NON-USER: CPT | Performed by: NURSE PRACTITIONER

## 2025-05-29 PROCEDURE — G8399 PT W/DXA RESULTS DOCUMENT: HCPCS | Performed by: NURSE PRACTITIONER

## 2025-05-29 PROCEDURE — 1159F MED LIST DOCD IN RCRD: CPT | Performed by: NURSE PRACTITIONER

## 2025-05-29 PROCEDURE — 1123F ACP DISCUSS/DSCN MKR DOCD: CPT | Performed by: NURSE PRACTITIONER

## 2025-05-29 RX ORDER — MELOXICAM 7.5 MG/1
7.5 TABLET ORAL DAILY PRN
Qty: 30 TABLET | Refills: 1 | Status: SHIPPED | OUTPATIENT
Start: 2025-05-29

## 2025-05-29 NOTE — PROGRESS NOTES
Mary Beth Durán is a 70 y.o. female  established patient, here for evaluation of the following chief complaint(s):  Chief Complaint   Patient presents with    Follow-up      Mary Beth Durán, was evaluated through a synchronous (real-time) audio-video encounter. The patient (or guardian if applicable) is aware that this is a billable service, which includes applicable co-pays. This Virtual Visit was conducted with patient's (and/or legal guardian's) consent. Patient identification was verified, and a caregiver was present when appropriate.   The patient was located at Home: [address] [facility] [address]  Provider was located at Facility (Appt Dept): [address] [facility] [facility], [address]  [place] [address]  Confirm you are appropriately licensed, registered, or certified to deliver care in the state where the patient is located as indicated above. If you are not or unsure, please re-schedule the visit: Yes, I confirm.      --HEIDI CISNEROS - CNP on 5/29/2025 at 1:22 PM    An electronic signature was used to authenticate this note.   Assessment and Plan  1. Benign thyroid cyst  2. 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 Chronic right hip pain  -     meloxicam (MOBIC) 7.5 MG tablet; Take 1 tablet by mouth daily as needed for Pain, Disp-30 tablet, R-1Normal       Return in about 1 week (around 6/5/2025) for Right hip pain, meloxicam, 15 minutes VV.   HPI:   Virtual visit.  Pt has had a FNA of her thyroid 5/13/2025. Findings suggestive of cyst contents with associated calcifications   NO MALIGNANT CELLS FOUND.     Pt is completing PT related to her left waist and hand.     Pt has seen ortho for her right hip pain. Pt has been frustrated w/ her hip pain. She has had 1 \"good\" injection in her right hip the 2nd injection she had \"no relieve.\" She has a recent MRI of right hip. She taking Tylenol and mortin 800 mg at

## 2025-06-03 ENCOUNTER — OFFICE VISIT (OUTPATIENT)
Facility: CLINIC | Age: 70
End: 2025-06-03
Payer: MEDICARE

## 2025-06-03 VITALS
HEART RATE: 78 BPM | DIASTOLIC BLOOD PRESSURE: 72 MMHG | SYSTOLIC BLOOD PRESSURE: 118 MMHG | BODY MASS INDEX: 32.65 KG/M2 | HEIGHT: 67 IN | RESPIRATION RATE: 16 BRPM | TEMPERATURE: 97.7 F | WEIGHT: 208 LBS | OXYGEN SATURATION: 93 %

## 2025-06-03 DIAGNOSIS — E66.09 CLASS 1 OBESITY DUE TO EXCESS CALORIES WITHOUT SERIOUS COMORBIDITY WITH BODY MASS INDEX (BMI) OF 32.0 TO 32.9 IN ADULT: ICD-10-CM

## 2025-06-03 DIAGNOSIS — E66.811 CLASS 1 OBESITY DUE TO EXCESS CALORIES WITHOUT SERIOUS COMORBIDITY WITH BODY MASS INDEX (BMI) OF 32.0 TO 32.9 IN ADULT: ICD-10-CM

## 2025-06-03 DIAGNOSIS — G89.29 CHRONIC RIGHT HIP PAIN: Primary | ICD-10-CM

## 2025-06-03 DIAGNOSIS — M25.551 CHRONIC RIGHT HIP PAIN: Primary | ICD-10-CM

## 2025-06-03 PROCEDURE — 99213 OFFICE O/P EST LOW 20 MIN: CPT | Performed by: NURSE PRACTITIONER

## 2025-06-03 PROCEDURE — 3017F COLORECTAL CA SCREEN DOC REV: CPT | Performed by: NURSE PRACTITIONER

## 2025-06-03 PROCEDURE — 1090F PRES/ABSN URINE INCON ASSESS: CPT | Performed by: NURSE PRACTITIONER

## 2025-06-03 PROCEDURE — 1159F MED LIST DOCD IN RCRD: CPT | Performed by: NURSE PRACTITIONER

## 2025-06-03 PROCEDURE — G8427 DOCREV CUR MEDS BY ELIG CLIN: HCPCS | Performed by: NURSE PRACTITIONER

## 2025-06-03 PROCEDURE — 1126F AMNT PAIN NOTED NONE PRSNT: CPT | Performed by: NURSE PRACTITIONER

## 2025-06-03 PROCEDURE — G8417 CALC BMI ABV UP PARAM F/U: HCPCS | Performed by: NURSE PRACTITIONER

## 2025-06-03 PROCEDURE — G8399 PT W/DXA RESULTS DOCUMENT: HCPCS | Performed by: NURSE PRACTITIONER

## 2025-06-03 PROCEDURE — 1036F TOBACCO NON-USER: CPT | Performed by: NURSE PRACTITIONER

## 2025-06-03 PROCEDURE — 1123F ACP DISCUSS/DSCN MKR DOCD: CPT | Performed by: NURSE PRACTITIONER

## 2025-06-03 NOTE — PROGRESS NOTES
Mary Beth Durán is a 70 y.o. female  established patient, here for evaluation of the following chief complaint(s):  Chief Complaint   Patient presents with    Hip Pain      Assessment and Plan  1. Chronic right hip pain  2. Class 1 obesity due to excess calories without serious comorbidity with body mass index (BMI) of 32.0 to 32.9 in adult       Return in about 6 weeks (around 7/15/2025) for pre op, 15.   HPI:   In office visit.  Follow-up visit related to right hip pain and meloxicam. She has just started the meloxicam today because the pharmacy is slow. She has been told she needs hip replacement and thinks it may happen 7/2025. Reviewed MRI.   ROS:    General: negative for - chills, fever, weight changes or malaise  HEENT: no sore throat, nasal congestion, vision problems or ear problems  Respiratory: no cough, shortness of breath, or wheezing  Cardiovascular: no chest pain, palpitations, or dyspnea on exertion  Gastrointestinal: no abdominal pain, N/V, change in bowel habits  Musculoskeletal: no back pain or joint pain  Neurological: no headache or dizziness  Endo:  No polyuria or polydipsia  : no urinary  Skin: none   Psychological: negative for - anxiety, depression, sleeps issues    Prior to Admission medications    Medication Sig Start Date End Date Taking? Authorizing Provider   meloxicam (MOBIC) 7.5 MG tablet Take 1 tablet by mouth daily as needed for Pain 5/29/25  Yes Amna Bond APRN - CNP   aspirin-acetaminophen-caffeine (EXCEDRIN MIGRAINE) 250-250-65 MG per tablet Aspirin-Acetaminophen-Caffeine Oral 250 mg-250 mg-65 mg    mg     active   Yes ProviderDonita MD   Cyanocobalamin 1000 MCG CAPS Cyanocobalamin Oral    mcg  GUMMY   active   Yes ProviderDonita MD   tiZANidine (ZANAFLEX) 2 MG tablet Take 1 tablet by mouth nightly as needed (muscle spasm) 12/19/24  Yes Amna Bond APRN - CNP   Calcium-Phosphorus-Vitamin D (CITRACAL +D3) 250-107-500 MG-MG-UNIT CHEW Take 2 tablets by

## 2025-06-16 DIAGNOSIS — M62.838 LEG MUSCLE SPASM: ICD-10-CM

## 2025-06-16 NOTE — TELEPHONE ENCOUNTER
[facility] requesting medication refill on the following:     Requested Prescriptions     Pending Prescriptions Disp Refills    tiZANidine (ZANAFLEX) 2 MG tablet 90 tablet 1     Sig: Take 1 tablet by mouth nightly as needed (muscle spasm)     LOV: 6/3/25   NOV: NONE     Please be advised, thank you.

## 2025-06-23 RX ORDER — TIZANIDINE 2 MG/1
2 TABLET ORAL NIGHTLY PRN
Qty: 90 TABLET | Refills: 1 | Status: SHIPPED | OUTPATIENT
Start: 2025-06-23

## (undated) DEVICE — INTENDED FOR TISSUE SEPARATION, AND OTHER PROCEDURES THAT REQUIRE A SHARP SURGICAL BLADE TO PUNCTURE OR CUT.: Brand: BARD-PARKER ® CARBON RIB-BACK BLADES

## (undated) DEVICE — LIGHT HANDLE: Brand: DEVON

## (undated) DEVICE — INTENDED FOR TISSUE SEPARATION, AND OTHER PROCEDURES THAT REQUIRE A SHARP SURGICAL BLADE TO PUNCTURE OR CUT.: Brand: BARD-PARKER SAFETY BLADES SIZE 10, STERILE

## (undated) DEVICE — SET EXTN PRIMING VOL 18ML 4 W STPCOCK ANES

## (undated) DEVICE — NON-ADHERENT STRIPS,OIL EMULSION: Brand: CURITY

## (undated) DEVICE — SPLINT ORTH W4XL15IN PLSTR OF PARIS LO EXOTHERM SMOOTH

## (undated) DEVICE — DISPOSABLE TOURNIQUET CUFF SINGLE BLADDER, DUAL PORT AND LUER LOCK CONNECTOR: Brand: COLOR CUFF

## (undated) DEVICE — HEX-LOCKING BLADE ELECTRODE: Brand: EDGE

## (undated) DEVICE — MEDI-VAC NON-CONDUCTIVE SUCTION TUBING: Brand: CARDINAL HEALTH

## (undated) DEVICE — PREP SKN CHLRAPRP APL 26ML STR --

## (undated) DEVICE — GAUZE,SPONGE,4"X4",16PLY,STRL,LF,10/TRAY: Brand: MEDLINE

## (undated) DEVICE — BLADE ASSEMB CLP HAIR FINE --

## (undated) DEVICE — GOWN,REINFORCED,POLY,AURORA,XLARGE,STRL: Brand: MEDLINE

## (undated) DEVICE — HOOK/TRIANGLE BLADE KIT: Brand: ENDOTRAC

## (undated) DEVICE — SYR LR LCK 1ML GRAD NSAF 30ML --

## (undated) DEVICE — BLADE SAW OSC 18.5X9.0X.038MM -- STRYKER 2296-3

## (undated) DEVICE — DYNAFORCE PLATE STERILE INSTRUMENT KIT

## (undated) DEVICE — SPLINT CNFRM 5INX15FT FBRGLS -- USE ITEM 320957

## (undated) DEVICE — KIT CLN UP BON SECOURS MARYV

## (undated) DEVICE — 3M™ STERI-STRIP™ COMPOUND BENZOIN TINCTURE 40 BAGS/CARTON 4 CARTONS/CASE C1544: Brand: 3M™ STERI-STRIP™

## (undated) DEVICE — 7 FRENCH DRAIN SYSTEM, STERILE: Brand: TLS

## (undated) DEVICE — SUTURE VCRL SZ 3-0 L27IN ABSRB UD L24MM PS-1 3/8 CIR PRIM J936H

## (undated) DEVICE — STRIP SKIN CLSR W0.25XL4IN WHT SPUNBOUND FBR NYL HI ADH

## (undated) DEVICE — X-RAY SPONGES,12 PLY: Brand: DERMACEA

## (undated) DEVICE — DERMACEA GAUZE ROLL: Brand: DERMACEA

## (undated) DEVICE — BANDAGE,GAUZE,BULKEE II,4.5"X4.1YD,STRL: Brand: MEDLINE

## (undated) DEVICE — SOLUTION IV 1000ML 0.9% SOD CHL

## (undated) DEVICE — PACK SURG BSHR TOT KNEE LF

## (undated) DEVICE — THREE-QUARTER SHEET: Brand: CONVERTORS

## (undated) DEVICE — SUTURE VCRL SZ 2-0 L27IN ABSRB UD L26MM SH 1/2 CIR J417H

## (undated) DEVICE — SUTURE VCRL SZ 3-0 L27IN ABSRB UD L26MM SH 1/2 CIR J416H

## (undated) DEVICE — SOLUTION IRRIG 3000ML 0.9% SOD CHL FLX CONT 0797208] ICU MEDICAL INC]

## (undated) DEVICE — ELASTIC BANDAGE 6": Brand: CARDINAL HEALTH

## (undated) DEVICE — STERILE POLYISOPRENE POWDER-FREE SURGICAL GLOVES: Brand: PROTEXIS

## (undated) DEVICE — BLANKET WRM AD W50XL85.8IN PACU FULL BODY FORC AIR

## (undated) DEVICE — SUTURE PROL SZ 4-0 L36IN NONABSORBABLE BLU L26MM SH 1/2 CIR 8521H

## (undated) DEVICE — KNEE ARTHROSCOPY III-LF: Brand: MEDLINE INDUSTRIES, INC.

## (undated) DEVICE — FLEX ADVANTAGE 3000CC: Brand: FLEX ADVANTAGE

## (undated) DEVICE — GARMENT,MEDLINE,DVT,INT,CALF,MED, GEN2: Brand: MEDLINE

## (undated) DEVICE — BANDAGE COMPR W6INXL3YD EXSANGUATION 1 PLY ESMARCH

## (undated) DEVICE — SUTURE ABSORBABLE BRAIDED 2-0 CT-1 27 IN UD VICRYL J259H

## (undated) DEVICE — REM POLYHESIVE ADULT PATIENT RETURN ELECTRODE: Brand: VALLEYLAB

## (undated) DEVICE — SUTURE MCRYL SZ 4-0 L27IN ABSRB UD L24MM PS-1 3/8 CIR PRIM Y935H

## (undated) DEVICE — PADDING CAST SOF-ROL 6INX4YD --

## (undated) DEVICE — COVER LT HNDL FLX

## (undated) DEVICE — DRESSING,GAUZE,XEROFORM,CURAD,1"X8",ST: Brand: CURAD